# Patient Record
Sex: MALE | Race: WHITE | Employment: FULL TIME | URBAN - METROPOLITAN AREA
[De-identification: names, ages, dates, MRNs, and addresses within clinical notes are randomized per-mention and may not be internally consistent; named-entity substitution may affect disease eponyms.]

---

## 2020-10-31 ENCOUNTER — APPOINTMENT (OUTPATIENT)
Dept: CT IMAGING | Age: 55
End: 2020-10-31
Attending: EMERGENCY MEDICINE
Payer: COMMERCIAL

## 2020-10-31 ENCOUNTER — HOSPITAL ENCOUNTER (EMERGENCY)
Age: 55
Discharge: HOME | End: 2020-10-31
Attending: EMERGENCY MEDICINE
Payer: COMMERCIAL

## 2020-10-31 VITALS
HEIGHT: 73 IN | BODY MASS INDEX: 24.52 KG/M2 | TEMPERATURE: 97.9 F | DIASTOLIC BLOOD PRESSURE: 88 MMHG | RESPIRATION RATE: 16 BRPM | HEART RATE: 71 BPM | WEIGHT: 185 LBS | OXYGEN SATURATION: 99 % | SYSTOLIC BLOOD PRESSURE: 136 MMHG

## 2020-10-31 DIAGNOSIS — N20.1 URETEROLITHIASIS: Primary | ICD-10-CM

## 2020-10-31 LAB
ANION GAP SERPL CALC-SCNC: 5 MMOL/L (ref 7–16)
BASOPHILS # BLD: 0 K/UL (ref 0–0.2)
BASOPHILS NFR BLD: 1 % (ref 0–2)
BUN SERPL-MCNC: 14 MG/DL (ref 6–23)
CALCIUM SERPL-MCNC: 8.8 MG/DL (ref 8.3–10.4)
CHLORIDE SERPL-SCNC: 111 MMOL/L (ref 98–107)
CO2 SERPL-SCNC: 25 MMOL/L (ref 21–32)
CREAT SERPL-MCNC: 1.37 MG/DL (ref 0.8–1.5)
DIFFERENTIAL METHOD BLD: NORMAL
EOSINOPHIL # BLD: 0.2 K/UL (ref 0–0.8)
EOSINOPHIL NFR BLD: 3 % (ref 0.5–7.8)
ERYTHROCYTE [DISTWIDTH] IN BLOOD BY AUTOMATED COUNT: 12.8 % (ref 11.9–14.6)
GLUCOSE SERPL-MCNC: 97 MG/DL (ref 65–100)
HCT VFR BLD AUTO: 42.7 % (ref 41.1–50.3)
HGB BLD-MCNC: 14.8 G/DL (ref 13.6–17.2)
IMM GRANULOCYTES # BLD AUTO: 0 K/UL (ref 0–0.5)
IMM GRANULOCYTES NFR BLD AUTO: 0 % (ref 0–5)
LYMPHOCYTES # BLD: 1 K/UL (ref 0.5–4.6)
LYMPHOCYTES NFR BLD: 15 % (ref 13–44)
MCH RBC QN AUTO: 31.2 PG (ref 26.1–32.9)
MCHC RBC AUTO-ENTMCNC: 34.7 G/DL (ref 31.4–35)
MCV RBC AUTO: 89.9 FL (ref 79.6–97.8)
MONOCYTES # BLD: 0.4 K/UL (ref 0.1–1.3)
MONOCYTES NFR BLD: 6 % (ref 4–12)
NEUTS SEG # BLD: 5.2 K/UL (ref 1.7–8.2)
NEUTS SEG NFR BLD: 76 % (ref 43–78)
NRBC # BLD: 0 K/UL (ref 0–0.2)
PLATELET # BLD AUTO: 206 K/UL (ref 150–450)
PMV BLD AUTO: 10.7 FL (ref 9.4–12.3)
POTASSIUM SERPL-SCNC: 4.2 MMOL/L (ref 3.5–5.1)
RBC # BLD AUTO: 4.75 M/UL (ref 4.23–5.6)
SODIUM SERPL-SCNC: 141 MMOL/L (ref 136–145)
WBC # BLD AUTO: 6.9 K/UL (ref 4.3–11.1)

## 2020-10-31 PROCEDURE — 80048 BASIC METABOLIC PNL TOTAL CA: CPT

## 2020-10-31 PROCEDURE — 99284 EMERGENCY DEPT VISIT MOD MDM: CPT

## 2020-10-31 PROCEDURE — 85025 COMPLETE CBC W/AUTO DIFF WBC: CPT

## 2020-10-31 PROCEDURE — 99283 EMERGENCY DEPT VISIT LOW MDM: CPT

## 2020-10-31 PROCEDURE — 74011250636 HC RX REV CODE- 250/636: Performed by: EMERGENCY MEDICINE

## 2020-10-31 PROCEDURE — 81003 URINALYSIS AUTO W/O SCOPE: CPT

## 2020-10-31 PROCEDURE — 96374 THER/PROPH/DIAG INJ IV PUSH: CPT

## 2020-10-31 PROCEDURE — 74176 CT ABD & PELVIS W/O CONTRAST: CPT

## 2020-10-31 RX ORDER — ONDANSETRON 4 MG/1
4 TABLET, ORALLY DISINTEGRATING ORAL
Qty: 10 TAB | Refills: 0 | Status: SHIPPED | OUTPATIENT
Start: 2020-10-31

## 2020-10-31 RX ORDER — HYDROCODONE BITARTRATE AND ACETAMINOPHEN 7.5; 325 MG/1; MG/1
1 TABLET ORAL
Qty: 12 TAB | Refills: 0 | Status: SHIPPED | OUTPATIENT
Start: 2020-10-31 | End: 2020-11-03

## 2020-10-31 RX ORDER — SODIUM CHLORIDE 0.9 % (FLUSH) 0.9 %
5-40 SYRINGE (ML) INJECTION EVERY 8 HOURS
Status: DISCONTINUED | OUTPATIENT
Start: 2020-10-31 | End: 2020-10-31 | Stop reason: HOSPADM

## 2020-10-31 RX ORDER — SODIUM CHLORIDE 0.9 % (FLUSH) 0.9 %
5-40 SYRINGE (ML) INJECTION AS NEEDED
Status: DISCONTINUED | OUTPATIENT
Start: 2020-10-31 | End: 2020-10-31 | Stop reason: HOSPADM

## 2020-10-31 RX ORDER — TAMSULOSIN HYDROCHLORIDE 0.4 MG/1
0.4 CAPSULE ORAL DAILY
Qty: 15 CAP | Refills: 0 | Status: SHIPPED | OUTPATIENT
Start: 2020-10-31 | End: 2020-11-15

## 2020-10-31 RX ORDER — KETOROLAC TROMETHAMINE 30 MG/ML
15 INJECTION, SOLUTION INTRAMUSCULAR; INTRAVENOUS
Status: COMPLETED | OUTPATIENT
Start: 2020-10-31 | End: 2020-10-31

## 2020-10-31 RX ADMIN — KETOROLAC TROMETHAMINE 15 MG: 30 INJECTION, SOLUTION INTRAMUSCULAR at 11:39

## 2020-10-31 NOTE — DISCHARGE INSTRUCTIONS
Patient Education        Kidney Stone: Care Instructions  Your Care Instructions     Kidney stones are formed when salts, minerals, and other substances normally found in the urine clump together. They can be as small as grains of sand or, rarely, as large as golf balls. While the stone is traveling through the ureter, which is the tube that carries urine from the kidney to the bladder, you will probably feel pain. The pain may be mild or very severe. You may also have some blood in your urine. As soon as the stone reaches the bladder, any intense pain should go away. If a stone is too large to pass on its own, you may need a medical procedure to help you pass the stone. The doctor has checked you carefully, but problems can develop later. If you notice any problems or new symptoms, get medical treatment right away. Follow-up care is a key part of your treatment and safety. Be sure to make and go to all appointments, and call your doctor if you are having problems. It's also a good idea to know your test results and keep a list of the medicines you take. How can you care for yourself at home? · Drink plenty of fluids, enough so that your urine is light yellow or clear like water. If you have kidney, heart, or liver disease and have to limit fluids, talk with your doctor before you increase the amount of fluids you drink. · Take pain medicines exactly as directed. Call your doctor if you think you are having a problem with your medicine. ? If the doctor gave you a prescription medicine for pain, take it as prescribed. ? If you are not taking a prescription pain medicine, ask your doctor if you can take an over-the-counter medicine. Read and follow all instructions on the label. · Your doctor may ask you to strain your urine so that you can collect your kidney stone when it passes. You can use a kitchen strainer or a tea strainer to catch the stone.  Store it in a plastic bag until you see your doctor again.  Preventing future kidney stones  Some changes in your diet may help prevent kidney stones. Depending on the cause of your stones, your doctor may recommend that you:  · Drink plenty of fluids, enough so that your urine is light yellow or clear like water. If you have kidney, heart, or liver disease and have to limit fluids, talk with your doctor before you increase the amount of fluids you drink. · Limit coffee, tea, and alcohol. Also avoid grapefruit juice. · Do not take more than the recommended daily dose of vitamins C and D.  · Avoid antacids such as Gaviscon, Maalox, Mylanta, or Tums. · Limit the amount of salt (sodium) in your diet. · Eat a balanced diet that is not too high in protein. · Limit foods that are high in a substance called oxalate, which can cause kidney stones. These foods include dark green vegetables, rhubarb, chocolate, wheat bran, nuts, cranberries, and beans. When should you call for help? Call your doctor now or seek immediate medical care if:    · You cannot keep down fluids.     · Your pain gets worse.     · You have a fever or chills.     · You have new or worse pain in your back just below your rib cage (the flank area).     · You have new or more blood in your urine. Watch closely for changes in your health, and be sure to contact your doctor if:    · You do not get better as expected. Where can you learn more? Go to http://www.gray.com/  Enter N237 in the search box to learn more about \"Kidney Stone: Care Instructions. \"  Current as of: April 15, 2020               Content Version: 12.6  © 0266-6204 [employer], [employer]. Care instructions adapted under license by [employer] (which disclaims liability or warranty for this information).  If you have questions about a medical condition or this instruction, always ask your healthcare professional. [address] any warranty or liability for your use of this information.

## 2020-10-31 NOTE — ED PROVIDER NOTES
Doron Mccord is a 54 y.o. male seen on 10/31/2020 at 11:25 AM in the [facility] EMERGENCY DEPT in room FT10/10. CC: Flank pain    HPI: 51-year-old  male with history of kidney stones presents emergency department complaining of left flank pain for the last 3 to 4 hours. Patient denies any nausea or vomiting but does describe hematuria and increased difficulty with urination. He denies any fever or chills. Patient states he was seen for some low back pain about a month ago and told he had a kidney stone on x-ray, last time he was seen for kidney stone was about 2 years ago and required lithotripsy. The history is provided by the patient. Back Pain    This is a new problem. The current episode started 3 to 5 hours ago. The problem has not changed since onset. The problem occurs constantly. Patient reports not work related injury. The pain is associated with no known injury. The pain is present in the lower back and left side. The quality of the pain is described as aching, sharp and similar to previous episodes. Radiates to: Left lower quadrant. The pain is at a severity of 7/10. Exacerbated by: Palpation. The pain is the same all the time. Stiffness is present all day. Associated symptoms include abdominal pain. Pertinent negatives include no chest pain, no fever, no numbness, no weight loss, no headaches, no abdominal swelling, no bowel incontinence, no perianal numbness, no bladder incontinence, no dysuria, no pelvic pain, no leg pain, no paresthesias, no paresis, no tingling and no weakness. He has tried bed rest for the symptoms. The treatment provided no relief. Risk factors include history of kidney stones. Blood in Urine    Associated symptoms include hematuria, flank pain, abdominal pain and back pain. Pertinent negatives include no nausea and no vomiting.        REVIEW OF SYSTEMS     Review of Systems   Constitutional: Negative for fever and weight loss. Cardiovascular: Negative for chest pain. Gastrointestinal: Positive for abdominal pain. Negative for bowel incontinence, constipation, diarrhea, nausea and vomiting. Genitourinary: Positive for flank pain and hematuria. Negative for bladder incontinence, dysuria, pelvic pain, penile pain, penile swelling and testicular pain. Musculoskeletal: Positive for back pain. Neurological: Negative for tingling, weakness, numbness, headaches and paresthesias. All other systems reviewed and are negative. PAST MEDICAL HISTORY     Past Medical History:   Diagnosis Date    History of syncope 3/2015    states due to dehydration. Had stress test, echo at [facility] and told OK    Kidney stone     right   Sched. for surgery 10/1/15  (has had prior episodes 1990's)     Past Surgical History:   Procedure Laterality Date    HX OTHER SURGICAL      no prior surgeries     Social History     Socioeconomic History    Marital status:      Spouse name: Not on file    Number of children: Not on file    Years of education: Not on file    Highest education level: Not on file   Tobacco Use    Smoking status: Current Every Day Smoker     Years: 15.00    Smokeless tobacco: Never Used    Tobacco comment: smokes 1 pack per week   Substance and Sexual Activity    Alcohol use: No     Alcohol/week: 0.0 standard drinks    Drug use: No     None     No Known Allergies     PHYSICAL EXAM       Vitals:    10/31/20 1103   BP: (!) 145/87   Pulse: 64   Resp: 16   Temp: 97.3 °F (36.3 °C)   SpO2: 99%    Vital signs were reviewed. Physical Exam  Vitals signs and nursing note reviewed. Constitutional:       General: He is not in acute distress. Appearance: He is well-developed. HENT:      Head: Normocephalic and atraumatic.       Right Ear: External ear normal.      Left Ear: External ear normal.      Mouth/Throat:      Mouth: Mucous membranes are moist.   Eyes:      Extraocular Movements: Extraocular movements intact. Conjunctiva/sclera: Conjunctivae normal.      Pupils: Pupils are equal, round, and reactive to light. Neck:      Musculoskeletal: Normal range of motion and neck supple. Cardiovascular:      Rate and Rhythm: Normal rate and regular rhythm. Pulses: Normal pulses. Heart sounds: Normal heart sounds. No murmur. Pulmonary:      Effort: Pulmonary effort is normal.      Breath sounds: Normal breath sounds. Abdominal:      General: Bowel sounds are normal.      Palpations: Abdomen is soft. Tenderness: There is abdominal tenderness. There is left CVA tenderness. There is no right CVA tenderness, guarding or rebound. Negative signs include Deras's sign and McBurney's sign. Hernia: No hernia is present. Musculoskeletal: Normal range of motion. Skin:     General: Skin is warm and dry. Capillary Refill: Capillary refill takes less than 2 seconds. Neurological:      General: No focal deficit present. Mental Status: He is alert and oriented to person, place, and time. Psychiatric:         Mood and Affect: Mood normal.         Behavior: Behavior normal.          MEDICAL DECISION MAKING     MDM  Number of Diagnoses or Management Options  Ureterolithiasis: new, needed workup     Amount and/or Complexity of Data Reviewed  Clinical lab tests: ordered and reviewed  Tests in the radiology section of CPT®: ordered and reviewed  Review and summarize past medical records: yes  Independent visualization of images, tracings, or specimens: yes    Risk of Complications, Morbidity, and/or Mortality  Presenting problems: moderate  Diagnostic procedures: moderate  Management options: moderate    Patient Progress  Patient progress: improved    Procedures    ED Course: The patient was observed in the ED. Patient feeling much improved at time of discharge.     Results Reviewed:      Recent Results (from the past 24 hour(s))   CBC WITH AUTOMATED DIFF    Collection Time: 10/31/20 11:42 AM   Result Value Ref Range    WBC 6.9 4.3 - 11.1 K/uL    RBC 4.75 4.23 - 5.6 M/uL    HGB 14.8 13.6 - 17.2 g/dL    HCT 42.7 41.1 - 50.3 %    MCV 89.9 79.6 - 97.8 FL    MCH 31.2 26.1 - 32.9 PG    MCHC 34.7 31.4 - 35.0 g/dL    RDW 12.8 11.9 - 14.6 %    PLATELET 171 949 - 328 K/uL    MPV 10.7 9.4 - 12.3 FL    ABSOLUTE NRBC 0.00 0.0 - 0.2 K/uL    DF AUTOMATED      NEUTROPHILS 76 43 - 78 %    LYMPHOCYTES 15 13 - 44 %    MONOCYTES 6 4.0 - 12.0 %    EOSINOPHILS 3 0.5 - 7.8 %    BASOPHILS 1 0.0 - 2.0 %    IMMATURE GRANULOCYTES 0 0.0 - 5.0 %    ABS. NEUTROPHILS 5.2 1.7 - 8.2 K/UL    ABS. LYMPHOCYTES 1.0 0.5 - 4.6 K/UL    ABS. MONOCYTES 0.4 0.1 - 1.3 K/UL    ABS. EOSINOPHILS 0.2 0.0 - 0.8 K/UL    ABS. BASOPHILS 0.0 0.0 - 0.2 K/UL    ABS. IMM. GRANS. 0.0 0.0 - 0.5 K/UL   METABOLIC PANEL, BASIC    Collection Time: 10/31/20 11:42 AM   Result Value Ref Range    Sodium 141 136 - 145 mmol/L    Potassium 4.2 3.5 - 5.1 mmol/L    Chloride 111 (H) 98 - 107 mmol/L    CO2 25 21 - 32 mmol/L    Anion gap 5 (L) 7 - 16 mmol/L    Glucose 97 65 - 100 mg/dL    BUN 14 6 - 23 MG/DL    Creatinine 1.37 0.8 - 1.5 MG/DL    GFR est AA >60 >60 ml/min/1.73m2    GFR est non-AA 57 (L) >60 ml/min/1.73m2    Calcium 8.8 8.3 - 10.4 MG/DL     CT UROGRAM WO CONT   Final Result   IMPRESSION:   1. A 4 mm calculus at the left UVJ causing mild left hydroureteronephrosis. 2. Bilateral nonobstructing renal calculi in the renal pelves. Disposition: Discharge  Diagnosis: Ureterolithiasis  ____________________________________________________________________    A portion of I discussed the results of all labs, procedures, radiographs, and treatments with the patient and available family. Treatment plan is agreed upon and the patient is ready for discharge. All voiced understanding of the discharge plan and medication instructions or changes as appropriate. Questions about treatment in the ED were answered.   All were encouraged to return should symptoms worsen or new problems develop. this note was generated using voice recognition dictation software. While the note has been reviewed for accuracy, please note certain words and phrases may not be transcribed as intended and some grammatical and/or typographical errors may be present.

## 2020-10-31 NOTE — ED NOTES
I have reviewed discharge instructions with the patient. The patient verbalized understanding. Patient left ED via Discharge Method: ambulatory to Home with self. Opportunity for questions and clarification provided. Patient given 3 scripts. To continue your aftercare when you leave the hospital, you may receive an automated call from our care team to check in on how you are doing. This is a free service and part of our promise to provide the best care and service to meet your aftercare needs.  If you have questions, or wish to unsubscribe from this service please call [phone]. Thank you for Choosing our [facility] Emergency Department.

## 2020-11-26 ENCOUNTER — HOSPITAL ENCOUNTER (EMERGENCY)
Age: 55
Discharge: HOME | End: 2020-11-26
Attending: EMERGENCY MEDICINE
Payer: COMMERCIAL

## 2020-11-26 ENCOUNTER — APPOINTMENT (OUTPATIENT)
Dept: GENERAL RADIOLOGY | Age: 55
End: 2020-11-26
Attending: EMERGENCY MEDICINE
Payer: COMMERCIAL

## 2020-11-26 VITALS
WEIGHT: 185 LBS | DIASTOLIC BLOOD PRESSURE: 80 MMHG | OXYGEN SATURATION: 99 % | RESPIRATION RATE: 16 BRPM | HEART RATE: 74 BPM | TEMPERATURE: 98 F | BODY MASS INDEX: 24.41 KG/M2 | SYSTOLIC BLOOD PRESSURE: 136 MMHG

## 2020-11-26 DIAGNOSIS — R10.9 LEFT FLANK PAIN: Primary | ICD-10-CM

## 2020-11-26 DIAGNOSIS — N28.9 RENAL INSUFFICIENCY: ICD-10-CM

## 2020-11-26 LAB
ANION GAP SERPL CALC-SCNC: 6 MMOL/L (ref 7–16)
APPEARANCE UR: ABNORMAL
BACTERIA URNS QL MICRO: 0 /HPF
BASOPHILS # BLD: 0 K/UL (ref 0–0.2)
BASOPHILS NFR BLD: 0 % (ref 0–2)
BILIRUB UR QL: NEGATIVE
BUN SERPL-MCNC: 14 MG/DL (ref 6–23)
CALCIUM SERPL-MCNC: 9 MG/DL (ref 8.3–10.4)
CASTS URNS QL MICRO: 0 /LPF
CHLORIDE SERPL-SCNC: 107 MMOL/L (ref 98–107)
CO2 SERPL-SCNC: 26 MMOL/L (ref 21–32)
COLOR UR: YELLOW
CREAT SERPL-MCNC: 2.08 MG/DL (ref 0.8–1.5)
DIFFERENTIAL METHOD BLD: ABNORMAL
EOSINOPHIL # BLD: 0 K/UL (ref 0–0.8)
EOSINOPHIL NFR BLD: 0 % (ref 0.5–7.8)
EPI CELLS #/AREA URNS HPF: 0 /HPF
ERYTHROCYTE [DISTWIDTH] IN BLOOD BY AUTOMATED COUNT: 12.6 % (ref 11.9–14.6)
GLUCOSE SERPL-MCNC: 120 MG/DL (ref 65–100)
GLUCOSE UR STRIP.AUTO-MCNC: NEGATIVE MG/DL
HCT VFR BLD AUTO: 44 % (ref 41.1–50.3)
HGB BLD-MCNC: 15.4 G/DL (ref 13.6–17.2)
HGB UR QL STRIP: ABNORMAL
IMM GRANULOCYTES # BLD AUTO: 0.1 K/UL (ref 0–0.5)
IMM GRANULOCYTES NFR BLD AUTO: 1 % (ref 0–5)
KETONES UR QL STRIP.AUTO: NEGATIVE MG/DL
LEUKOCYTE ESTERASE UR QL STRIP.AUTO: NEGATIVE
LYMPHOCYTES # BLD: 1.3 K/UL (ref 0.5–4.6)
LYMPHOCYTES NFR BLD: 9 % (ref 13–44)
MCH RBC QN AUTO: 31.2 PG (ref 26.1–32.9)
MCHC RBC AUTO-ENTMCNC: 35 G/DL (ref 31.4–35)
MCV RBC AUTO: 89.2 FL (ref 79.6–97.8)
MONOCYTES # BLD: 1 K/UL (ref 0.1–1.3)
MONOCYTES NFR BLD: 7 % (ref 4–12)
NEUTS SEG # BLD: 11.8 K/UL (ref 1.7–8.2)
NEUTS SEG NFR BLD: 83 % (ref 43–78)
NITRITE UR QL STRIP.AUTO: NEGATIVE
NRBC # BLD: 0 K/UL (ref 0–0.2)
PH UR STRIP: 6 [PH] (ref 5–9)
PLATELET # BLD AUTO: 246 K/UL (ref 150–450)
PMV BLD AUTO: 10.1 FL (ref 9.4–12.3)
POTASSIUM SERPL-SCNC: 3.9 MMOL/L (ref 3.5–5.1)
PROT UR STRIP-MCNC: NEGATIVE MG/DL
RBC # BLD AUTO: 4.93 M/UL (ref 4.23–5.6)
RBC #/AREA URNS HPF: 0 /HPF
SODIUM SERPL-SCNC: 139 MMOL/L (ref 136–145)
SP GR UR REFRACTOMETRY: 1.01 (ref 1–1.02)
UROBILINOGEN UR QL STRIP.AUTO: 0.2 EU/DL (ref 0.2–1)
WBC # BLD AUTO: 14.2 K/UL (ref 4.3–11.1)
WBC URNS QL MICRO: ABNORMAL /HPF

## 2020-11-26 PROCEDURE — 99283 EMERGENCY DEPT VISIT LOW MDM: CPT

## 2020-11-26 PROCEDURE — 96361 HYDRATE IV INFUSION ADD-ON: CPT

## 2020-11-26 PROCEDURE — 36415 COLL VENOUS BLD VENIPUNCTURE: CPT

## 2020-11-26 PROCEDURE — 85025 COMPLETE CBC W/AUTO DIFF WBC: CPT

## 2020-11-26 PROCEDURE — 96374 THER/PROPH/DIAG INJ IV PUSH: CPT

## 2020-11-26 PROCEDURE — 81001 URINALYSIS AUTO W/SCOPE: CPT

## 2020-11-26 PROCEDURE — 74018 RADEX ABDOMEN 1 VIEW: CPT

## 2020-11-26 PROCEDURE — 80048 BASIC METABOLIC PNL TOTAL CA: CPT

## 2020-11-26 PROCEDURE — 74011250636 HC RX REV CODE- 250/636: Performed by: EMERGENCY MEDICINE

## 2020-11-26 RX ORDER — MELOXICAM 15 MG/1
15 TABLET ORAL DAILY
Qty: 30 TAB | Refills: 0 | Status: SHIPPED | OUTPATIENT
Start: 2020-11-26 | End: 2021-10-02

## 2020-11-26 RX ORDER — HYDROCODONE BITARTRATE AND ACETAMINOPHEN 5; 325 MG/1; MG/1
1 TABLET ORAL
Qty: 15 TAB | Refills: 0 | Status: SHIPPED | OUTPATIENT
Start: 2020-11-26 | End: 2020-12-01

## 2020-11-26 RX ORDER — KETOROLAC TROMETHAMINE 30 MG/ML
15 INJECTION, SOLUTION INTRAMUSCULAR; INTRAVENOUS
Status: COMPLETED | OUTPATIENT
Start: 2020-11-26 | End: 2020-11-26

## 2020-11-26 RX ADMIN — KETOROLAC TROMETHAMINE 15 MG: 30 INJECTION, SOLUTION INTRAMUSCULAR at 07:48

## 2020-11-26 RX ADMIN — SODIUM CHLORIDE 1000 ML: 900 INJECTION, SOLUTION INTRAVENOUS at 08:44

## 2020-11-26 RX ADMIN — SODIUM CHLORIDE 1000 ML: 900 INJECTION, SOLUTION INTRAVENOUS at 07:48

## 2020-11-26 NOTE — ED PROVIDER NOTES
Patient is a 59-year-old male presenting emerge department today complaining of left flank pain. Patient states it started yesterday and for the last 24 hours he has been dealing with a dull aching pain not relieved with hydrocodone at home. Patient did have a 4 mm distal left UVJ stone month ago. He said that that stone actually passed the same day that he was in the emergency department he never ended up taking any medications. Patient says that [place] railroad [place] and he drinks a lot of sodas. He has had stones in the past and this feels just like one of his kidney stones. Patient had one episode of vomiting yesterday. He is not having nausea today. He is never had to have surgery for his stones. Past Medical History:   Diagnosis Date    History of syncope 3/2015    states due to dehydration. Had stress test, echo at [facility] and told OK    Kidney stone     right   Sched.  for surgery 10/1/15  (has had prior episodes 1990's)       Past Surgical History:   Procedure Laterality Date    HX OTHER SURGICAL      no prior surgeries         Family History:   Problem Relation Age of Onset    Hypertension Mother     Stroke Mother     Thyroid Disease Mother     Heart Disease Father        Social History     Socioeconomic History    Marital status:      Spouse name: Not on file    Number of children: Not on file    Years of education: Not on file    Highest education level: Not on file   Occupational History    Not on file   Social Needs    Financial resource strain: Not on file    Food insecurity     Worry: Not on file     Inability: Not on file    Transportation needs     Medical: Not on file     Non-medical: Not on file   Tobacco Use    Smoking status: Current Every Day Smoker     Years: 15.00    Smokeless tobacco: Never Used    Tobacco comment: smokes 1 pack per week   Substance and Sexual Activity    Alcohol use: No     Alcohol/week: 0.0 standard drinks  Drug use: No    Sexual activity: Not on file   Lifestyle    Physical activity     Days per week: Not on file     Minutes per session: Not on file    Stress: Not on file   Relationships    Social connections     Talks on phone: Not on file     Gets together: Not on file     Attends Mandaen service: Not on file     Active member of club or organization: Not on file     Attends meetings of clubs or organizations: Not on file     Relationship status: Not on file    Intimate partner violence     Fear of current or ex partner: Not on file     Emotionally abused: Not on file     Physically abused: Not on file     Forced sexual activity: Not on file   Other Topics Concern    Not on file   Social History Narrative    Not on file         ALLERGIES: Patient has no known allergies. Review of Systems   All other systems reviewed and are negative. Vitals:    11/26/20 0718 11/26/20 0719 11/26/20 0847   BP: (!) 140/77     Pulse: 68     Resp: 16     Temp: 97.9 °F (36.6 °C)     SpO2: 99% 98% 99%   Weight: 83.9 kg (185 lb)              Physical Exam     GENERAL:The patient has Body mass index is 24.41 kg/m². Well-hydrated. No acute distress. VITAL SIGNS: Heart rate, blood pressure, respiratory rate reviewed as recorded in  nurse's notes  EYES: Pupils reactive. Extraocular motion intact. No conjunctival redness or drainage. LUNGS: No accessory muscle use  CARDIOVASCULAR: Regular rate and rhythm  EXTREMITIES: Pt moving all 4 extremities with out limitations. Normal muscle tone. NEUROLOGIC: Cranial nerve exam reveals face is symmetrical, tongue is midline  speech is clear. No focal deficits noted  SKIN: No rash or petechiae. Good skin turgor palpated. PSYCHIATRIC: Alert and oriented. Appropriate behavior and judgment.       MDM  Number of Diagnoses or Management Options  Diagnosis management comments:   UTI, pyelonephritis, renal colic, ureteral stone              Amount and/or Complexity of Data Reviewed  Clinical lab tests: reviewed and ordered  Tests in the medicine section of CPT®: ordered and reviewed  Review and summarize past medical records: yes      ED Course as of Nov 26 0912   Thu Nov 26, 2020   0107 Patient's pain is now a 2 out of 10. He is resting comfortably. I talked to him about the acute renal injury and that he will receive another liter of IV fluids. []   8891 Impression:      1. Right renal stone from prior CT demonstrated with left upper renal pole stone  not visualized, likely obscured by extensive stool and bowel gas. XR ABD (KUB) []   [id number] Patient continues to feel well. He has the medicines he needs at home and will not need any additional prescriptions. I did encourage him to follow-up with urology secondary to his renal insufficiency.     []      ED Course User Index  [] Jung Orozco, DO       Procedures

## 2020-11-26 NOTE — ED NOTES
I have reviewed discharge instructions with the patient. The patient verbalized understanding. Patient left ED via Discharge Method: ambulatory to Home with self. Opportunity for questions and clarification provided. Patient given 2 scripts. To continue your aftercare when you leave the hospital, you may receive an automated call from our care team to check in on how you are doing. This is a free service and part of our promise to provide the best care and service to meet your aftercare needs.  If you have questions, or wish to unsubscribe from this service please call [phone]. Thank you for Choosing our [facility] Emergency Department.

## 2020-11-26 NOTE — DISCHARGE INSTRUCTIONS
You may need to follow-up with urology if your symptoms persist.  Continue to take the Flomax and use the medications for pain control and nausea control as needed. Risk of opioid analgesics include, but are not limited to: Overdose they can stop or slow your breathing and lead to death; fractures from falls; drowsiness leading to injury; tolerance, dependence and addiction. You should not operate any motorized vehicles or work from a height greater than ground level when taking opioid analgesics as they increase your fall risks.

## 2020-12-17 ENCOUNTER — ANESTHESIA EVENT (OUTPATIENT)
Dept: SURGERY | Age: 55
End: 2020-12-17
Payer: COMMERCIAL

## 2020-12-17 NOTE — ANESTHESIA PREPROCEDURE EVALUATION
Relevant Problems   RENAL FAILURE   (+) Kidney stone   (+) Renal cysts, acquired, bilateral       Anesthetic History   No history of anesthetic complications            Review of Systems / Medical History  Patient summary reviewed and pertinent labs reviewed    Pulmonary          Smoker         Neuro/Psych   Within defined limits           Cardiovascular                  Exercise tolerance: >4 METS     GI/Hepatic/Renal         Renal disease: stones       Endo/Other  Within defined limits           Other Findings              Physical Exam    Airway  Mallampati: II  TM Distance: 4 - 6 cm  Neck ROM: normal range of motion   Mouth opening: Normal     Cardiovascular    Rhythm: regular  Rate: normal      Pertinent negatives: No murmur   Dental  No notable dental hx       Pulmonary  Breath sounds clear to auscultation               Abdominal         Other Findings            Anesthetic Plan    ASA: 2  Anesthesia type: general          Induction: Intravenous  Anesthetic plan and risks discussed with: Patient      LMA

## 2020-12-18 ENCOUNTER — APPOINTMENT (OUTPATIENT)
Dept: CT IMAGING | Age: 55
End: 2020-12-18
Attending: UROLOGY
Payer: COMMERCIAL

## 2020-12-18 ENCOUNTER — APPOINTMENT (OUTPATIENT)
Dept: GENERAL RADIOLOGY | Age: 55
End: 2020-12-18
Attending: UROLOGY
Payer: COMMERCIAL

## 2020-12-18 ENCOUNTER — ANESTHESIA (OUTPATIENT)
Dept: SURGERY | Age: 55
End: 2020-12-18
Payer: COMMERCIAL

## 2020-12-18 ENCOUNTER — HOSPITAL ENCOUNTER (OUTPATIENT)
Age: 55
Setting detail: OUTPATIENT SURGERY
Discharge: HOME | End: 2020-12-18
Attending: UROLOGY | Admitting: UROLOGY
Payer: COMMERCIAL

## 2020-12-18 VITALS
RESPIRATION RATE: 18 BRPM | OXYGEN SATURATION: 100 % | DIASTOLIC BLOOD PRESSURE: 79 MMHG | TEMPERATURE: 98.2 F | SYSTOLIC BLOOD PRESSURE: 127 MMHG | HEART RATE: 65 BPM

## 2020-12-18 DIAGNOSIS — N20.0 KIDNEY STONE: Primary | ICD-10-CM

## 2020-12-18 PROCEDURE — 76210000021 HC REC RM PH II 0.5 TO 1 HR

## 2020-12-18 PROCEDURE — C2617 STENT, NON-COR, TEM W/O DEL: HCPCS | Performed by: UROLOGY

## 2020-12-18 PROCEDURE — 74011000250 HC RX REV CODE- 250: Performed by: NURSE ANESTHETIST, CERTIFIED REGISTERED

## 2020-12-18 PROCEDURE — 74011250637 HC RX REV CODE- 250/637: Performed by: ANESTHESIOLOGY

## 2020-12-18 PROCEDURE — 74018 RADEX ABDOMEN 1 VIEW: CPT

## 2020-12-18 PROCEDURE — 76210000006 HC OR PH I REC 0.5 TO 1 HR: Performed by: UROLOGY

## 2020-12-18 PROCEDURE — 2709999900 HC NON-CHARGEABLE SUPPLY: Performed by: UROLOGY

## 2020-12-18 PROCEDURE — 77030019927 HC TBNG IRR CYSTO BAXT -A: Performed by: UROLOGY

## 2020-12-18 PROCEDURE — 74011250636 HC RX REV CODE- 250/636: Performed by: ANESTHESIOLOGY

## 2020-12-18 PROCEDURE — C1769 GUIDE WIRE: HCPCS | Performed by: UROLOGY

## 2020-12-18 PROCEDURE — 74011250636 HC RX REV CODE- 250/636: Performed by: NURSE ANESTHETIST, CERTIFIED REGISTERED

## 2020-12-18 PROCEDURE — 74176 CT ABD & PELVIS W/O CONTRAST: CPT

## 2020-12-18 PROCEDURE — 74011000636 HC RX REV CODE- 636: Performed by: UROLOGY

## 2020-12-18 PROCEDURE — 76010000138 HC OR TIME 0.5 TO 1 HR: Performed by: UROLOGY

## 2020-12-18 PROCEDURE — 76210000021 HC REC RM PH II 0.5 TO 1 HR: Performed by: UROLOGY

## 2020-12-18 PROCEDURE — 76060000032 HC ANESTHESIA 0.5 TO 1 HR

## 2020-12-18 PROCEDURE — 77030010509 HC AIRWY LMA MSK TELE -A: Performed by: ANESTHESIOLOGY

## 2020-12-18 PROCEDURE — 74011250636 HC RX REV CODE- 250/636: Performed by: UROLOGY

## 2020-12-18 DEVICE — URETERAL STENT
Type: IMPLANTABLE DEVICE | Site: URETER | Status: FUNCTIONAL
Brand: PERCUFLEX™ PLUS

## 2020-12-18 RX ORDER — EPHEDRINE SULFATE/0.9% NACL/PF 50 MG/5 ML
SYRINGE (ML) INTRAVENOUS AS NEEDED
Status: DISCONTINUED | OUTPATIENT
Start: 2020-12-18 | End: 2020-12-18 | Stop reason: HOSPADM

## 2020-12-18 RX ORDER — OXYCODONE HYDROCHLORIDE 5 MG/1
5 TABLET ORAL
Status: DISCONTINUED | OUTPATIENT
Start: 2020-12-18 | End: 2020-12-18 | Stop reason: HOSPADM

## 2020-12-18 RX ORDER — ACETAMINOPHEN 500 MG
1000 TABLET ORAL ONCE
Status: COMPLETED | OUTPATIENT
Start: 2020-12-18 | End: 2020-12-18

## 2020-12-18 RX ORDER — ALBUTEROL SULFATE 0.83 MG/ML
2.5 SOLUTION RESPIRATORY (INHALATION) AS NEEDED
Status: DISCONTINUED | OUTPATIENT
Start: 2020-12-18 | End: 2020-12-18 | Stop reason: HOSPADM

## 2020-12-18 RX ORDER — PROPOFOL 10 MG/ML
INJECTION, EMULSION INTRAVENOUS AS NEEDED
Status: DISCONTINUED | OUTPATIENT
Start: 2020-12-18 | End: 2020-12-18 | Stop reason: HOSPADM

## 2020-12-18 RX ORDER — LIDOCAINE HYDROCHLORIDE 20 MG/ML
INJECTION, SOLUTION EPIDURAL; INFILTRATION; INTRACAUDAL; PERINEURAL AS NEEDED
Status: DISCONTINUED | OUTPATIENT
Start: 2020-12-18 | End: 2020-12-18 | Stop reason: HOSPADM

## 2020-12-18 RX ORDER — FENTANYL CITRATE 50 UG/ML
INJECTION, SOLUTION INTRAMUSCULAR; INTRAVENOUS AS NEEDED
Status: DISCONTINUED | OUTPATIENT
Start: 2020-12-18 | End: 2020-12-18 | Stop reason: HOSPADM

## 2020-12-18 RX ORDER — DEXAMETHASONE SODIUM PHOSPHATE 4 MG/ML
INJECTION, SOLUTION INTRA-ARTICULAR; INTRALESIONAL; INTRAMUSCULAR; INTRAVENOUS; SOFT TISSUE AS NEEDED
Status: DISCONTINUED | OUTPATIENT
Start: 2020-12-18 | End: 2020-12-18 | Stop reason: HOSPADM

## 2020-12-18 RX ORDER — HYDROMORPHONE HYDROCHLORIDE 2 MG/ML
0.5 INJECTION, SOLUTION INTRAMUSCULAR; INTRAVENOUS; SUBCUTANEOUS
Status: DISCONTINUED | OUTPATIENT
Start: 2020-12-18 | End: 2020-12-18 | Stop reason: HOSPADM

## 2020-12-18 RX ORDER — LIDOCAINE HYDROCHLORIDE 10 MG/ML
0.1 INJECTION INFILTRATION; PERINEURAL AS NEEDED
Status: DISCONTINUED | OUTPATIENT
Start: 2020-12-18 | End: 2020-12-18 | Stop reason: HOSPADM

## 2020-12-18 RX ORDER — CIPROFLOXACIN 500 MG/1
500 TABLET ORAL 2 TIMES DAILY
Qty: 6 TAB | Refills: 0 | Status: SHIPPED | OUTPATIENT
Start: 2020-12-18 | End: 2020-12-21

## 2020-12-18 RX ORDER — SODIUM CHLORIDE, SODIUM LACTATE, POTASSIUM CHLORIDE, CALCIUM CHLORIDE 600; 310; 30; 20 MG/100ML; MG/100ML; MG/100ML; MG/100ML
1000 INJECTION, SOLUTION INTRAVENOUS CONTINUOUS
Status: DISCONTINUED | OUTPATIENT
Start: 2020-12-18 | End: 2020-12-18 | Stop reason: HOSPADM

## 2020-12-18 RX ORDER — CEFAZOLIN SODIUM/WATER 2 G/20 ML
2 SYRINGE (ML) INTRAVENOUS
Status: COMPLETED | OUTPATIENT
Start: 2020-12-18 | End: 2020-12-18

## 2020-12-18 RX ORDER — ONDANSETRON 2 MG/ML
4 INJECTION INTRAMUSCULAR; INTRAVENOUS
Status: DISCONTINUED | OUTPATIENT
Start: 2020-12-18 | End: 2020-12-18 | Stop reason: HOSPADM

## 2020-12-18 RX ORDER — HYDROCODONE BITARTRATE AND ACETAMINOPHEN 5; 325 MG/1; MG/1
1 TABLET ORAL
Qty: 15 TAB | Refills: 0 | Status: SHIPPED | OUTPATIENT
Start: 2020-12-18 | End: 2020-12-21

## 2020-12-18 RX ORDER — MIDAZOLAM HYDROCHLORIDE 1 MG/ML
2 INJECTION, SOLUTION INTRAMUSCULAR; INTRAVENOUS
Status: DISCONTINUED | OUTPATIENT
Start: 2020-12-18 | End: 2020-12-18 | Stop reason: HOSPADM

## 2020-12-18 RX ORDER — ONDANSETRON 2 MG/ML
INJECTION INTRAMUSCULAR; INTRAVENOUS AS NEEDED
Status: DISCONTINUED | OUTPATIENT
Start: 2020-12-18 | End: 2020-12-18 | Stop reason: HOSPADM

## 2020-12-18 RX ADMIN — FENTANYL CITRATE 50 MCG: 50 INJECTION INTRAMUSCULAR; INTRAVENOUS at 14:41

## 2020-12-18 RX ADMIN — LIDOCAINE HYDROCHLORIDE 100 MG: 20 INJECTION, SOLUTION EPIDURAL; INFILTRATION; INTRACAUDAL; PERINEURAL at 14:09

## 2020-12-18 RX ADMIN — FENTANYL CITRATE 25 MCG: 50 INJECTION INTRAMUSCULAR; INTRAVENOUS at 14:17

## 2020-12-18 RX ADMIN — DEXAMETHASONE SODIUM PHOSPHATE 4 MG: 4 INJECTION, SOLUTION INTRAMUSCULAR; INTRAVENOUS at 14:15

## 2020-12-18 RX ADMIN — FENTANYL CITRATE 25 MCG: 50 INJECTION INTRAMUSCULAR; INTRAVENOUS at 14:25

## 2020-12-18 RX ADMIN — Medication 2 G: at 14:12

## 2020-12-18 RX ADMIN — PROPOFOL 150 MG: 10 INJECTION, EMULSION INTRAVENOUS at 14:09

## 2020-12-18 RX ADMIN — Medication 10 MG: at 14:30

## 2020-12-18 RX ADMIN — ONDANSETRON 4 MG: 2 INJECTION INTRAMUSCULAR; INTRAVENOUS at 14:15

## 2020-12-18 RX ADMIN — SODIUM CHLORIDE, SODIUM LACTATE, POTASSIUM CHLORIDE, AND CALCIUM CHLORIDE 1000 ML: 600; 310; 30; 20 INJECTION, SOLUTION INTRAVENOUS at 11:28

## 2020-12-18 RX ADMIN — ACETAMINOPHEN 1000 MG: 500 TABLET, FILM COATED ORAL at 11:28

## 2020-12-18 NOTE — PERIOP NOTES
Pt case changed in pre-op. New case generated in main pre-op for Dr Katy Villeda to take patient care from Dr Clemente Mejia. Pt is to be transferred to main pre-op via stretcher.

## 2020-12-18 NOTE — PERIOP NOTES
TRANSFER - IN REPORT:    Verbal report received from Jeramy RN(name) on [address]  being received from [facility] Pre Op(unit) for routine progression of care      Report consisted of patients Situation, Background, Assessment and   Recommendations(SBAR). Information from the following report(s) Kardex, MAR and Recent Results was reviewed with the receiving nurse. Opportunity for questions and clarification was provided. Assessment completed upon patients arrival to unit and care assumed.

## 2020-12-18 NOTE — ANESTHESIA POSTPROCEDURE EVALUATION
Procedure(s):  PROCEDURE CANCELLED - NOT PERFORMED. general    Anesthesia Post Evaluation      Multimodal analgesia: multimodal analgesia used between 6 hours prior to anesthesia start to PACU discharge  Patient location during evaluation: PACU  Patient participation: complete - patient participated  Level of consciousness: awake and awake and alert  Pain management: adequate  Airway patency: patent  Anesthetic complications: no  Cardiovascular status: acceptable  Respiratory status: acceptable  Hydration status: acceptable  Post anesthesia nausea and vomiting:  controlled      INITIAL Post-op Vital signs:   Vitals Value Taken Time   /86 12/18/20 1547   Temp     Pulse 62 12/18/20 1547   Resp 18 12/18/20 1536   SpO2 100 % 12/18/20 1547   Vitals shown include unvalidated device data.

## 2020-12-18 NOTE — PROGRESS NOTES
Patient scheduled as urgent and was screened for signs/symptoms of COVID or exposure within last 48 hours. No rapid test needed.

## 2020-12-18 NOTE — H&P
History and Physical    Subjective:      Bishnu Lanier is a 54 y.o. male evaluated with a stone in the left proximal ureter. pts passed a ureteral stone then the renal pelvis stone dropped into the proximal ureter. Prior KUB showed this and this image is when the stone was in the proximal ureter. Looks like the left lower pole stone had moved into the proximal ureter. He had pain yesterday and felt that the stone moved and  Is he is in mild discomfort. KUB this morning has a lot of bowel gas and stool and no obvious upper ureteral stone. Stone was about 11mm. In size and on KUB it may be in distal ureter over the sacrum on the left side. Will get a stat CT UROGRAM!!    CT shows left side stone in sacral area now    Past Medical History:   Diagnosis Date    History of syncope 3/2015    states due to dehydration. [facility]    Kidney stone     right   Sched. for surgery 10/1/15  (has had prior episodes 1990's)     Past Surgical History:   Procedure Laterality Date    HX OTHER SURGICAL      no prior surgeries      Family History   Problem Relation Age of Onset    Hypertension Mother     Stroke Mother     Thyroid Disease Mother     Heart Disease Father      Social History     Tobacco Use    Smoking status: Current Every Day Smoker     Years: 15.00    Smokeless tobacco: Never Used    Tobacco comment: smokes 1 pack per week   Substance Use Topics    Alcohol use: No     Alcohol/week: 0.0 standard drinks     No Known Allergies  Prior to Admission medications    Medication Sig Start Date End Date Taking? Authorizing Provider   meloxicam (MOBIC) 15 mg tablet Take 1 Tab by mouth daily. 11/26/20   Sundar ESPINAL,    ondansetron (Zofran ODT) 4 mg disintegrating tablet Take 1 Tab by mouth every eight (8) hours as needed for Nausea. 10/31/20   Ismael Lema MD        Review of Systems:  no change     Objective:      No data found.     No data recorded. Physical Exam:  GENERAL: alert, cooperative, no distress, appears stated age, LUNG: clear to auscultation bilaterally, HEART: regular rate and rhythm, S1, S2 normal, no murmur, click, rub or gallop, ABDOMEN: soft, non-tender. Bowel sounds normal. No masses,  no organomegaly      Assessment:     ureterolithiasis left 11mm stone. [place]s moved to sacral area and we cant do ESWL. Plan ureteroscopy      Plan:     1. The risks, benefits, complications, treatment options, and expected outcomes were discussed with the patient. The patient understands the risks, any and all questions were answered to the patient's satisfaction. 2. Proceed with outpatient left ureteroscopy , laser litho, and stent instead of ESWL since stone moved distally.

## 2020-12-18 NOTE — DISCHARGE INSTRUCTIONS
Ureteral Stent Placement: What to Expect at [address] [place] [address]  A ureteral (say \"you-REE-ter-ul\") stent is a thin, hollow tube that is placed in the ureter to help urine pass from the kidney into the bladder. Ureters are the tubes that connect the kidneys to the bladder. You may have a small amount of blood in your urine for 1 to 3 days after the procedure. While the stent is in place, you may have to urinate more often, feel a sudden need to urinate, or feel like you cannot completely empty your bladder. You may feel some pain when you urinate or do strenuous activity. You also may notice a small amount of blood in your urine after strenuous activities. These side effects usually do not prevent people from doing their normal daily activities. You may have a string attached to your stent. Do not to pull the string unless the doctor tells you to. The doctor will use the string to pull out the stent when you no longer need it. After the procedure, urine may flow better from your kidneys to your bladder. A ureteral stent may be left in place for several days or for as long as several months. Your doctor will take it out when you no longer need it. Cystoscopy: What to Expect at [address] [facility]  A cystoscopy is a procedure that lets a doctor look inside of the bladder and the urethra. The urethra is the tube that carries urine from the bladder to outside the body. The doctor uses a thin, lighted tube called a cystoscope to look for kidney or bladder stones, tumors, bleeding, or infection. After the cystoscopy, your urethra may be sore at first, and it may burn when you urinate for the first few days after the procedure. You may feel the need to urinate more often, and your urine may be pink. These symptoms should get better in 1 or 2 days. You will probably be able to go back to work or most of your usual activities in 1 or 2 days. How can you care for yourself at home?   Activity  · Rest when you feel tired. Getting enough sleep will help you recover. · Try to walk each day. Start by walking a little more than you did the day before. Bit by bit, increase the amount you walk. Walking boosts blood flow and helps prevent pneumonia and constipation. · Avoid strenuous activities, such as bicycle riding, jogging, weight lifting, or aerobic exercise, until your doctor says it is okay. · Ask your doctor when you can drive again. · Most people are able to return to work within 1 or 2 days after the procedure. · You may shower and take baths as usual.  · Ask your doctor when it is okay for you to have sex. Diet  · You can eat your normal diet. If your stomach is upset, try bland, low-fat foods like plain rice, broiled chicken, toast, and yogurt. · Drink plenty of fluids (unless your doctor tells you not to). Medicines  · Take pain medicines exactly as directed. ¨ If the doctor gave you a prescription medicine for pain, take it as prescribed. ¨ If you are not taking a prescription pain medicine, ask your doctor if you can take an over-the-counter medicine. · If you think your pain medicine is making you sick to your stomach:  ¨ Take your medicine after meals (unless your doctor has told you not to). ¨ Ask your doctor for a different pain medicine. · If your doctor prescribed antibiotics, take them as directed. Do not stop taking them just because you feel better. You need to take the full course of antibiotics. Follow-up care is a key part of your treatment and safety. Be sure to make and go to all appointments, and call your doctor if you are having problems. It's also a good idea to know your test results and keep a list of the medicines you take. When should you call for help? Call 911 anytime you think you may need emergency care. For example, call if:  · You passed out (lost consciousness). · You have severe trouble breathing.   · You have sudden chest pain and shortness of breath, or you cough up blood.  · You have severe belly pain. Call your doctor now or seek immediate medical care if:  · You are sick to your stomach or cannot keep fluids down. · Your urine is still red or you see blood clots after you have urinated several times. · You have trouble passing urine or stool, especially if you have pain or swelling in your lower belly. · You have signs of a blood clot, such as:  ¨ Pain in your calf, back of the knee, thigh, or groin. ¨ Redness and swelling in your leg or groin. · You develop a fever or severe chills. · You have pain in your back just below your rib cage. This is called flank pain. Watch closely for changes in your health, and be sure to contact your doctor if:  · A burning feeling is normal for a day or two after the test, but call if it does not get better. · You have a frequent urge to urinate but can pass only small amounts of urine. · It is normal for the urine to have a pinkish color for a few days after the test, but call if it does not get better or if your urine is cloudy, smells bad, or has pus in it. After general anesthesia or intravenous sedation, for 24 hours or while taking prescription Narcotics:  · Limit your activities  · A responsible adult needs to be with you for the next 24 hours  · Do not drive and operate hazardous machinery  · Do not make important personal or business decisions  · Do not drink alcoholic beverages  · If you have not urinated within 8 hours after discharge, and you are experiencing discomfort from urinary retention, please go to the nearest ED. · If you have sleep apnea and have a CPAP machine, please use it for all naps and sleeping. · Please use caution when taking narcotics and any of your home medications that may cause drowsiness. *  Please give a list of your current medications to your Primary Care Provider.   *  Please update this list whenever your medications are discontinued, doses are      changed, or new medications (including over-the-counter products) are added. *  Please carry medication information at all times in case of emergency situations. These are general instructions for a healthy lifestyle:  No smoking/ No tobacco products/ Avoid exposure to second hand smoke  Surgeon General's Warning:  Quitting smoking now greatly reduces serious risk to your health. Obesity, smoking, and sedentary lifestyle greatly increases your risk for illness  A healthy diet, regular physical exercise & weight monitoring are important for maintaining a healthy lifestyle    You may be retaining fluid if you have a history of heart failure or if you experience any of the following symptoms:  Weight gain of 3 pounds or more overnight or 5 pounds in a week, increased swelling in our hands or feet or shortness of breath while lying flat in bed. Please call your doctor as soon as you notice any of these symptoms; do not wait until your next office visit.

## 2020-12-18 NOTE — BRIEF OP NOTE
Brief Postoperative Note    Patient: Vaishnavi Vides  YOB: 1965  MRN: [id number]    Date of Procedure: 12/18/2020     Pre-Op Diagnosis: Left distal ureteral stone [N20.1]    Post-Op Diagnosis: Same    Procedure(s):  CYSTOSCOPY LEFT URETEROSCOPY, laser lithotripsy and stent placement/RPG    Surgeon(s):  Shubham Atkins DO    Surgical Assistant: None    Anesthesia: General     Estimated Blood Loss (mL): Nil    Complications: none immediate    Specimens: * No specimens in log *     Implants:   Implant Name Type Inv.  Item Serial No.  Lot No. LRB No. Used Action   STENT URET 6F 26CM -- [address] [id number]  [id number]  STENT URET 6F 26CM -- [facility] [id number]  [facility]_ [id number] Left 1 Implanted       Drains: * No LDAs found *    Findings: see op note    Electronically Signed by Simon Rodriguez DO on 12/18/2020 at 2:51 PM

## 2020-12-19 NOTE — OP NOTES
[facility]  OPERATIVE REPORT    Name:  Ashok Colin  MR#:  [id number]  :  1965  ACCOUNT #:  [de-identified]  DATE OF SERVICE:  2020    PREOPERATIVE DIAGNOSIS:  Left distal ureteral stone. POSTOPERATIVE DIAGNOSIS:  Left distal ureteral stone. PROCEDURES PERFORMED:  Cystoscopy, left ureteroscopy, laser lithotripsy, left retrograde pyelogram and left ureteral stent placement. SURGEON:  Ang Atkins DO    ASSISTANT:  None. ANESTHESIA:  General.    COMPLICATIONS:  None immediate. SPECIMENS REMOVED:  None. IMPLANTS:  Left ureteral stent. ESTIMATED BLOOD LOSS:  None. CLINICAL HISTORY:  This is a 51-year-old gentleman who was recently diagnosed with a left proximal ureteral stone by plain imaging. He presented earlier today for shockwave lithotripsy. However, on repeat imaging, it was determined that his stone had migrated into the distal ureter. All risks, benefits and alternatives to the above-mentioned procedure have been reviewed and he is willing to proceed at this time. PROCEDURE:  Patient consent was obtained. The patient was brought back to the operating room at which time he was placed in the supine position. After the uneventful induction of general anesthesia, he was then placed in a dorsal lithotomy position. His genital area was prepped and draped and a sterile field applied. A 22-Beninese cystoscope was inserted into the urethra and advanced into the bladder under direct visualization. The urethra and bladder were unremarkable. A guidewire was passed up the left collecting system without difficulty. Semi-rigid ureteroscopy was then performed. I immediately encountered an approximately 7-8 mm stone overlying the left iliac vessels. This was fragmented nicely using a 365 micron holmium laser lithotripsy fiber. The stone was moderately resistant to fragmentation.   Following adequate fragmentation of the stone, I then surveyed the left distal and mid ureter. No other stones or abnormalities were seen. A retrograde pyelogram was performed through the scope outlining the left collecting system. Mild left hydroureteronephrosis was seen to the level of the distal ureter. No filling defects or extravasation of contrast was noted. The scope was then removed and a 6 x 26 double-J ureteral stent was then passed under cystoscopic and fluoroscopic guidance. Excellent curl was noted proximally as well as distally. The patient's bladder was drained. The procedure was terminated. The patient tolerated the procedure well. I will plan to see him back in the office late next week for cystoscopy and stent removal.    INTERPRETATION OF LEFT RETROGRADE PYELOGRAM:  A left retrograde pyelogram was performed through the ureteroscope outlining the left collecting system. Mild left hydroureteronephrosis was seen to the level of the iliac vessels. No filling defects or extravasation of contrast was noted.       DO YOVANY [id number]  D:  12/18/2020 14:57  T:  12/19/2020 2:09  JOB #:  [id number]

## 2021-09-13 ENCOUNTER — APPOINTMENT (OUTPATIENT)
Dept: GENERAL RADIOLOGY | Age: 56
End: 2021-09-13
Attending: PHYSICIAN ASSISTANT
Payer: COMMERCIAL

## 2021-09-13 ENCOUNTER — HOSPITAL ENCOUNTER (EMERGENCY)
Age: 56
Discharge: HOME | End: 2021-09-13
Attending: EMERGENCY MEDICINE
Payer: COMMERCIAL

## 2021-09-13 VITALS
WEIGHT: 185 LBS | BODY MASS INDEX: 24.41 KG/M2 | SYSTOLIC BLOOD PRESSURE: 104 MMHG | TEMPERATURE: 99.4 F | OXYGEN SATURATION: 92 % | HEART RATE: 77 BPM | RESPIRATION RATE: 18 BRPM | DIASTOLIC BLOOD PRESSURE: 62 MMHG

## 2021-09-13 DIAGNOSIS — U07.1 COVID-19: Primary | ICD-10-CM

## 2021-09-13 LAB
ALBUMIN SERPL-MCNC: 3 G/DL (ref 3.5–5)
ALBUMIN/GLOB SERPL: 0.8 {RATIO} (ref 1.2–3.5)
ALP SERPL-CCNC: 200 U/L (ref 50–136)
ALT SERPL-CCNC: 79 U/L (ref 12–65)
ANION GAP SERPL CALC-SCNC: 8 MMOL/L (ref 7–16)
AST SERPL-CCNC: 101 U/L (ref 15–37)
BASOPHILS # BLD: 0 K/UL (ref 0–0.2)
BASOPHILS NFR BLD: 0 % (ref 0–2)
BILIRUB SERPL-MCNC: 0.6 MG/DL (ref 0.2–1.1)
BUN SERPL-MCNC: 9 MG/DL (ref 6–23)
CALCIUM SERPL-MCNC: 8.8 MG/DL (ref 8.3–10.4)
CHLORIDE SERPL-SCNC: 104 MMOL/L (ref 98–107)
CO2 SERPL-SCNC: 27 MMOL/L (ref 21–32)
CREAT SERPL-MCNC: 1.22 MG/DL (ref 0.8–1.5)
DIFFERENTIAL METHOD BLD: ABNORMAL
EOSINOPHIL # BLD: 0.2 K/UL (ref 0–0.8)
EOSINOPHIL NFR BLD: 3 % (ref 0.5–7.8)
ERYTHROCYTE [DISTWIDTH] IN BLOOD BY AUTOMATED COUNT: 12.7 % (ref 11.9–14.6)
GLOBULIN SER CALC-MCNC: 4 G/DL (ref 2.3–3.5)
GLUCOSE SERPL-MCNC: 114 MG/DL (ref 65–100)
HCT VFR BLD AUTO: 45 % (ref 41.1–50.3)
HGB BLD-MCNC: 15.3 G/DL (ref 13.6–17.2)
IMM GRANULOCYTES # BLD AUTO: 0 K/UL (ref 0–0.5)
IMM GRANULOCYTES NFR BLD AUTO: 0 % (ref 0–5)
LYMPHOCYTES # BLD: 0.5 K/UL (ref 0.5–4.6)
LYMPHOCYTES NFR BLD: 10 % (ref 13–44)
MCH RBC QN AUTO: 30.5 PG (ref 26.1–32.9)
MCHC RBC AUTO-ENTMCNC: 34 G/DL (ref 31.4–35)
MCV RBC AUTO: 89.6 FL (ref 79.6–97.8)
MONOCYTES # BLD: 0.3 K/UL (ref 0.1–1.3)
MONOCYTES NFR BLD: 6 % (ref 4–12)
NEUTS SEG # BLD: 4.7 K/UL (ref 1.7–8.2)
NEUTS SEG NFR BLD: 81 % (ref 43–78)
NRBC # BLD: 0 K/UL (ref 0–0.2)
PLATELET # BLD AUTO: 189 K/UL (ref 150–450)
PMV BLD AUTO: 9.8 FL (ref 9.4–12.3)
POTASSIUM SERPL-SCNC: 4 MMOL/L (ref 3.5–5.1)
PROT SERPL-MCNC: 7 G/DL (ref 6.3–8.2)
RBC # BLD AUTO: 5.02 M/UL (ref 4.23–5.6)
SODIUM SERPL-SCNC: 139 MMOL/L (ref 136–145)
WBC # BLD AUTO: 5.7 K/UL (ref 4.3–11.1)

## 2021-09-13 PROCEDURE — 74011250636 HC RX REV CODE- 250/636: Performed by: PHYSICIAN ASSISTANT

## 2021-09-13 PROCEDURE — 80053 COMPREHEN METABOLIC PANEL: CPT

## 2021-09-13 PROCEDURE — 85025 COMPLETE CBC W/AUTO DIFF WBC: CPT

## 2021-09-13 PROCEDURE — 96374 THER/PROPH/DIAG INJ IV PUSH: CPT

## 2021-09-13 PROCEDURE — 99284 EMERGENCY DEPT VISIT MOD MDM: CPT

## 2021-09-13 PROCEDURE — 96375 TX/PRO/DX INJ NEW DRUG ADDON: CPT

## 2021-09-13 PROCEDURE — 71046 X-RAY EXAM CHEST 2 VIEWS: CPT

## 2021-09-13 RX ORDER — ONDANSETRON 2 MG/ML
4 INJECTION INTRAMUSCULAR; INTRAVENOUS
Status: COMPLETED | OUTPATIENT
Start: 2021-09-13 | End: 2021-09-13

## 2021-09-13 RX ORDER — KETOROLAC TROMETHAMINE 30 MG/ML
30 INJECTION, SOLUTION INTRAMUSCULAR; INTRAVENOUS
Status: COMPLETED | OUTPATIENT
Start: 2021-09-13 | End: 2021-09-13

## 2021-09-13 RX ORDER — DEXAMETHASONE 6 MG/1
6 TABLET ORAL
Qty: 7 TABLET | Refills: 0 | Status: SHIPPED | OUTPATIENT
Start: 2021-09-13 | End: 2021-10-02

## 2021-09-13 RX ORDER — DEXAMETHASONE 6 MG/1
6 TABLET ORAL
Qty: 7 TABLET | Refills: 0 | Status: SHIPPED | OUTPATIENT
Start: 2021-09-13 | End: 2021-09-13 | Stop reason: SDUPTHER

## 2021-09-13 RX ADMIN — ONDANSETRON 4 MG: 2 INJECTION INTRAMUSCULAR; INTRAVENOUS at 10:01

## 2021-09-13 RX ADMIN — SODIUM CHLORIDE 1000 ML: 900 INJECTION, SOLUTION INTRAVENOUS at 10:01

## 2021-09-13 RX ADMIN — KETOROLAC TROMETHAMINE 30 MG: 30 INJECTION, SOLUTION INTRAMUSCULAR; INTRAVENOUS at 10:00

## 2021-09-13 NOTE — ED PROVIDER NOTES
Mask was worn during the entire patient examination. Zenaida Mondragon is a 64 y.o. male who presents to the ED with a chief complaint of cough. And shortness of breath. He did test positive for coronavirus on 6 September. He continues to be fatigued and is feeling very weak not eating well. He feels like the cough is gradually getting worse as well. Fatigue has progressed as well. He denies any past lung problems does not wear any home oxygen. Arrived via EMS with normal oxygen saturations. No vomiting      Positive For Covid-19  Associated symptoms: cough    Associated symptoms: no chest pain, no diarrhea, no nausea and no vomiting         Past Medical History:   Diagnosis Date    History of syncope 3/2015    states due to dehydration. Had stress test, echo at [facility] and told OK    Kidney stone     right   Sched.  for surgery 10/1/15  (has had prior episodes 1990's)       Past Surgical History:   Procedure Laterality Date    HX OTHER SURGICAL      no prior surgeries         Family History:   Problem Relation Age of Onset    Hypertension Mother     Stroke Mother     Thyroid Disease Mother     Heart Disease Father        Social History     Socioeconomic History    Marital status:      Spouse name: Not on file    Number of children: Not on file    Years of education: Not on file    Highest education level: Not on file   Occupational History    Not on file   Tobacco Use    Smoking status: Current Every Day Smoker     Years: 15.00    Smokeless tobacco: Never Used    Tobacco comment: smokes 1 pack per week   Substance and Sexual Activity    Alcohol use: No     Alcohol/week: 0.0 standard drinks    Drug use: No    Sexual activity: Not on file   Other Topics Concern    Not on file   Social History Narrative    Not on file     Social Determinants of Health     Financial Resource Strain:     Difficulty of Paying Living Expenses:    Food Insecurity:     Worried About Running Out of Food in the Last Year:    [address] in the Last Year:    Transportation Needs:     Lack of Transportation (Medical):  Lack of Transportation (Non-Medical):    Physical Activity:     Days of Exercise per Week:     Minutes of Exercise per Session:    Stress:     Feeling of Stress :    Social Connections:     Frequency of Communication with Friends and Family:     Frequency of Social Gatherings with Friends and Family:     Attends Holiness Services:     Active Member of Clubs or Organizations:     Attends Club or Organization Meetings:     Marital Status:    Intimate Partner Violence:     Fear of Current or Ex-Partner:     Emotionally Abused:     Physically Abused:     Sexually Abused: ALLERGIES: Patient has no known allergies. Review of Systems   Constitutional: Positive for activity change, appetite change and fatigue. Negative for fever. Respiratory: Positive for cough and shortness of breath. Negative for apnea, choking, chest tightness, wheezing and stridor. Cardiovascular: Negative for chest pain and palpitations. Gastrointestinal: Negative for abdominal distention, abdominal pain, diarrhea, nausea and vomiting. Skin: Negative for color change and wound. Neurological: Positive for weakness. All other systems reviewed and are negative. Vitals:    09/13/21 0941 09/13/21 0943 09/13/21 1143 09/13/21 1213   BP: 116/79  114/72 104/62   Pulse: 77      Resp: 18      Temp: 99.4 °F (37.4 °C)      SpO2: 95%  93% 92%   Weight:  83.9 kg (185 lb)              Physical Exam  Vitals and nursing note reviewed. Constitutional:       General: He is not in acute distress. Appearance: Normal appearance. He is well-developed. He is not ill-appearing, toxic-appearing or diaphoretic. HENT:      Head: Normocephalic and atraumatic. Eyes:      General: No scleral icterus. Conjunctiva/sclera: Conjunctivae normal.   Neck:      Trachea: No tracheal deviation. Cardiovascular:      Rate and Rhythm: Normal rate and regular rhythm. Pulmonary:      Effort: Pulmonary effort is normal. No respiratory distress. Breath sounds: No stridor. No wheezing, rhonchi or rales. Chest:      Chest wall: No tenderness. Abdominal:      General: Abdomen is flat. Tenderness: There is no abdominal tenderness. There is no guarding or rebound. Skin:     Capillary Refill: Capillary refill takes less than 2 seconds. Neurological:      General: No focal deficit present. Mental Status: He is alert and oriented to person, place, and time. Mental status is at baseline. Psychiatric:         Mood and Affect: Mood normal.         Behavior: Behavior normal.          MDM  Number of Diagnoses or Management Options  COVID-19  Diagnosis management comments: Given normal white count and recent Covid positive test I do not feel that this is bacterial will treat with Decadron his oxygen saturation has not dropped while in the ED. He will be given home pulse oximeter instructed to come to the downtown ER for set drops into the 80s and does not easily go back up. Rashaad Banegas MD; 9/13/2021 @3:46 PM Voice dictation software was used during the making of this note. This software is not perfect and grammatical and other typographical errors may be present.   This note has not been proofread for errors.  ===================================================================          Amount and/or Complexity of Data Reviewed  Clinical lab tests: ordered and reviewed (Results for orders placed or performed during the hospital encounter of 09/13/21  -CBC WITH AUTOMATED DIFF       Result                      Value             Ref Range           WBC                         5.7               4.3 - 11.1 K*       RBC                         5.02              4.23 - 5.6 M*       HGB                         15.3              13.6 - 17.2 *       HCT                         45.0              41.1 - 50.3 %       MCV                         89.6              79.6 - 97.8 *       MCH                         30.5              26.1 - 32.9 *       MCHC                        34.0              31.4 - 35.0 *       RDW                         12.7              11.9 - 14.6 %       PLATELET                    189               150 - 450 K/*       MPV                         9.8               9.4 - 12.3 FL       ABSOLUTE NRBC               0.00              0.0 - 0.2 K/*       DF                          AUTOMATED                             NEUTROPHILS                 81 (H)            43 - 78 %           LYMPHOCYTES                 10 (L)            13 - 44 %           MONOCYTES                   6                 4.0 - 12.0 %        EOSINOPHILS                 3                 0.5 - 7.8 %         BASOPHILS                   0                 0.0 - 2.0 %         IMMATURE GRANULOCYTES       0                 0.0 - 5.0 %         ABS. NEUTROPHILS            4.7               1.7 - 8.2 K/*       ABS. LYMPHOCYTES            0.5               0.5 - 4.6 K/*       ABS. MONOCYTES              0.3               0.1 - 1.3 K/*       ABS. EOSINOPHILS            0.2               0.0 - 0.8 K/*       ABS. BASOPHILS              0.0               0.0 - 0.2 K/*       ABS. IMM.  GRANS.            0.0               0.0 - 0.5 K/*  -METABOLIC PANEL, COMPREHENSIVE       Result                      Value             Ref Range           Sodium                      139               136 - 145 mm*       Potassium                   4.0               3.5 - 5.1 mm*       Chloride                    104               98 - 107 mmo*       CO2                         27                21 - 32 mmol*       Anion gap                   8                 7 - 16 mmol/L       Glucose                     114 (H)           65 - 100 mg/*       BUN                         9                 6 - 23 MG/DL        Creatinine                  1.22              0.8 - 1.5 MG*       GFR est AA                  >60               >60 ml/min/1*       GFR est non-AA              >60               >60 ml/min/1*       Calcium                     8.8               8.3 - 10.4 M*       Bilirubin, total            0.6               0.2 - 1.1 MG*       ALT (SGPT)                  79 (H)            12 - 65 U/L         AST (SGOT)                  101 (H)           15 - 37 U/L         Alk. phosphatase            200 (H)           50 - 136 U/L        Protein, total              7.0               6.3 - 8.2 g/*       Albumin                     3.0 (L)           3.5 - 5.0 g/*       Globulin                    4.0 (H)           2.3 - 3.5 g/*       A-G Ratio                   0.8 (L)           1.2 - 3.5     )  Tests in the radiology section of CPT®: ordered and reviewed (XR CHEST PA LAT    Result Date: 9/13/2021  TWO-VIEW CHEST: CLINICAL HISTORY: Short of breath, cough, and fatigue recently positive Covid tests. COMPARISON:  None. FINDINGS: PA and lateral chest images demonstrate no confluent pneumonic infiltrate or significant pleural fluid collection. There is subtle patchy infiltrate at both lung bases, more marked on the left with mild elevation of the hemidiaphragm. The heart size is within normal limits without evidence of congestive heart failure or pneumothorax. The bony thorax appears intact on these views. SUBTLE BIBASILAR ATELECTASIS/INFILTRATE, MORE MARKED ON THE LEFT.     )           Procedures

## 2021-09-13 NOTE — ED NOTES
I have reviewed discharge instructions with the patient. The patient verbalized understanding. Patient left ED via Discharge Method: ambulatory to Home with wife    Opportunity for questions and clarification provided. Patient given 1 scripts. Pulse oximeter provided with instructions        To continue your aftercare when you leave the hospital, you may receive an automated call from our care team to check in on how you are doing. This is a free service and part of our promise to provide the best care and service to meet your aftercare needs.  If you have questions, or wish to unsubscribe from this service please call [phone]. Thank you for Choosing our [facility] Emergency Department.

## 2021-09-13 NOTE — ED TRIAGE NOTES
Brought in by EMS from home/ PT tested +on the 6th for covid/ c/o of sob/ cough/fatigue/ PC sent him to hospital to be evaluated/ VS stable 99% on room air

## 2021-09-13 NOTE — ED NOTES
Pt dx with covid 1 week ago, worsening cough, decreased appetite, using otc meds with minimal relief, no covid vaccine, no immunocompromise   Patient evaluated initially in triage. Rapid Medical Evaluation was conducted and necessary orders have been placed. I have performed a medical screening exam.  Care will now be transferred to the attending physician in the emergency department.   ANDREE Doshi 9:42 AM

## 2021-09-18 ENCOUNTER — HOSPITAL ENCOUNTER (INPATIENT)
Age: 56
LOS: 14 days | Discharge: HOME HEALTH | DRG: 871 | End: 2021-10-02
Admitting: INTERNAL MEDICINE
Payer: COMMERCIAL

## 2021-09-18 ENCOUNTER — APPOINTMENT (OUTPATIENT)
Dept: GENERAL RADIOLOGY | Age: 56
DRG: 871 | End: 2021-09-18
Payer: COMMERCIAL

## 2021-09-18 DIAGNOSIS — U07.1 PNEUMONIA DUE TO COVID-19 VIRUS: Primary | ICD-10-CM

## 2021-09-18 DIAGNOSIS — J12.82 PNEUMONIA DUE TO COVID-19 VIRUS: Primary | ICD-10-CM

## 2021-09-18 DIAGNOSIS — R09.02 HYPOXIA: ICD-10-CM

## 2021-09-18 PROBLEM — J96.01 ACUTE HYPOXEMIC RESPIRATORY FAILURE DUE TO COVID-19 (HCC): Status: ACTIVE | Noted: 2021-09-18

## 2021-09-18 LAB
ALBUMIN SERPL-MCNC: 2.6 G/DL (ref 3.5–5)
ALBUMIN/GLOB SERPL: 0.7 {RATIO} (ref 1.2–3.5)
ALP SERPL-CCNC: 240 U/L (ref 50–136)
ALT SERPL-CCNC: 93 U/L (ref 12–65)
ANION GAP SERPL CALC-SCNC: 9 MMOL/L (ref 7–16)
AST SERPL-CCNC: 56 U/L (ref 15–37)
ATRIAL RATE: 75 BPM
BASOPHILS # BLD: 0 K/UL (ref 0–0.2)
BASOPHILS NFR BLD: 0 % (ref 0–2)
BILIRUB SERPL-MCNC: 1.1 MG/DL (ref 0.2–1.1)
BUN SERPL-MCNC: 16 MG/DL (ref 6–23)
CALCIUM SERPL-MCNC: 8.1 MG/DL (ref 8.3–10.4)
CALCULATED P AXIS, ECG09: 73 DEGREES
CALCULATED R AXIS, ECG10: 63 DEGREES
CALCULATED T AXIS, ECG11: 45 DEGREES
CHLORIDE SERPL-SCNC: 106 MMOL/L (ref 98–107)
CO2 SERPL-SCNC: 24 MMOL/L (ref 21–32)
CREAT SERPL-MCNC: 1.03 MG/DL (ref 0.8–1.5)
CRP SERPL-MCNC: 6.6 MG/DL (ref 0–0.9)
DIAGNOSIS, 93000: NORMAL
DIFFERENTIAL METHOD BLD: ABNORMAL
EOSINOPHIL # BLD: 0 K/UL (ref 0–0.8)
EOSINOPHIL NFR BLD: 0 % (ref 0.5–7.8)
ERYTHROCYTE [DISTWIDTH] IN BLOOD BY AUTOMATED COUNT: 12.5 % (ref 11.9–14.6)
FERRITIN SERPL-MCNC: 787 NG/ML (ref 8–388)
GLOBULIN SER CALC-MCNC: 3.5 G/DL (ref 2.3–3.5)
GLUCOSE SERPL-MCNC: 122 MG/DL (ref 65–100)
HCT VFR BLD AUTO: 42.8 % (ref 41.1–50.3)
HGB BLD-MCNC: 14.9 G/DL (ref 13.6–17.2)
IMM GRANULOCYTES # BLD AUTO: 0.3 K/UL (ref 0–0.5)
IMM GRANULOCYTES NFR BLD AUTO: 2 % (ref 0–5)
LACTATE SERPL-SCNC: 1.5 MMOL/L (ref 0.4–2)
LDH SERPL L TO P-CCNC: 668 U/L (ref 100–190)
LYMPHOCYTES # BLD: 0.5 K/UL (ref 0.5–4.6)
LYMPHOCYTES NFR BLD: 5 % (ref 13–44)
MAGNESIUM SERPL-MCNC: 2.1 MG/DL (ref 1.8–2.4)
MCH RBC QN AUTO: 30.3 PG (ref 26.1–32.9)
MCHC RBC AUTO-ENTMCNC: 34.8 G/DL (ref 31.4–35)
MCV RBC AUTO: 87.2 FL (ref 79.6–97.8)
MONOCYTES # BLD: 0.6 K/UL (ref 0.1–1.3)
MONOCYTES NFR BLD: 5 % (ref 4–12)
NEUTS SEG # BLD: 9.8 K/UL (ref 1.7–8.2)
NEUTS SEG NFR BLD: 88 % (ref 43–78)
NRBC # BLD: 0 K/UL (ref 0–0.2)
P-R INTERVAL, ECG05: 114 MS
PLATELET # BLD AUTO: 331 K/UL (ref 150–450)
PMV BLD AUTO: 9.5 FL (ref 9.4–12.3)
POTASSIUM SERPL-SCNC: 4.1 MMOL/L (ref 3.5–5.1)
PROCALCITONIN SERPL-MCNC: 0.14 NG/ML
PROT SERPL-MCNC: 6.1 G/DL (ref 6.3–8.2)
Q-T INTERVAL, ECG07: 362 MS
QRS DURATION, ECG06: 90 MS
QTC CALCULATION (BEZET), ECG08: 404 MS
RBC # BLD AUTO: 4.91 M/UL (ref 4.23–5.6)
SARS-COV-2, COV2: NORMAL
SODIUM SERPL-SCNC: 139 MMOL/L (ref 136–145)
VENTRICULAR RATE, ECG03: 75 BPM
WBC # BLD AUTO: 11.2 K/UL (ref 4.3–11.1)

## 2021-09-18 PROCEDURE — 74011250636 HC RX REV CODE- 250/636: Performed by: INTERNAL MEDICINE

## 2021-09-18 PROCEDURE — 87040 BLOOD CULTURE FOR BACTERIA: CPT

## 2021-09-18 PROCEDURE — 83735 ASSAY OF MAGNESIUM: CPT

## 2021-09-18 PROCEDURE — 74011250637 HC RX REV CODE- 250/637: Performed by: FAMILY MEDICINE

## 2021-09-18 PROCEDURE — 77010033711 HC HIGH FLOW OXYGEN

## 2021-09-18 PROCEDURE — 83615 LACTATE (LD) (LDH) ENZYME: CPT

## 2021-09-18 PROCEDURE — U0004 COV-19 TEST NON-CDC HGH THRU: HCPCS

## 2021-09-18 PROCEDURE — 83605 ASSAY OF LACTIC ACID: CPT

## 2021-09-18 PROCEDURE — 36415 COLL VENOUS BLD VENIPUNCTURE: CPT

## 2021-09-18 PROCEDURE — 82728 ASSAY OF FERRITIN: CPT

## 2021-09-18 PROCEDURE — 85025 COMPLETE CBC W/AUTO DIFF WBC: CPT

## 2021-09-18 PROCEDURE — 94761 N-INVAS EAR/PLS OXIMETRY MLT: CPT

## 2021-09-18 PROCEDURE — 86140 C-REACTIVE PROTEIN: CPT

## 2021-09-18 PROCEDURE — 93005 ELECTROCARDIOGRAM TRACING: CPT

## 2021-09-18 PROCEDURE — 65270000029 HC RM PRIVATE

## 2021-09-18 PROCEDURE — 71045 X-RAY EXAM CHEST 1 VIEW: CPT

## 2021-09-18 PROCEDURE — 80053 COMPREHEN METABOLIC PANEL: CPT

## 2021-09-18 PROCEDURE — 74011250637 HC RX REV CODE- 250/637: Performed by: INTERNAL MEDICINE

## 2021-09-18 PROCEDURE — 99285 EMERGENCY DEPT VISIT HI MDM: CPT

## 2021-09-18 PROCEDURE — 84145 PROCALCITONIN (PCT): CPT

## 2021-09-18 RX ORDER — PROMETHAZINE HYDROCHLORIDE 25 MG/1
12.5 TABLET ORAL
Status: DISCONTINUED | OUTPATIENT
Start: 2021-09-18 | End: 2021-10-02 | Stop reason: HOSPADM

## 2021-09-18 RX ORDER — GUAIFENESIN 600 MG/1
600 TABLET, EXTENDED RELEASE ORAL EVERY 12 HOURS
Status: DISCONTINUED | OUTPATIENT
Start: 2021-09-18 | End: 2021-09-22

## 2021-09-18 RX ORDER — SODIUM CHLORIDE 0.9 % (FLUSH) 0.9 %
5-40 SYRINGE (ML) INJECTION EVERY 8 HOURS
Status: DISCONTINUED | OUTPATIENT
Start: 2021-09-18 | End: 2021-10-02 | Stop reason: HOSPADM

## 2021-09-18 RX ORDER — ACETAMINOPHEN 325 MG/1
650 TABLET ORAL
Status: DISCONTINUED | OUTPATIENT
Start: 2021-09-18 | End: 2021-10-02 | Stop reason: HOSPADM

## 2021-09-18 RX ORDER — DEXAMETHASONE SODIUM PHOSPHATE 100 MG/10ML
6 INJECTION INTRAMUSCULAR; INTRAVENOUS EVERY 24 HOURS
Status: DISCONTINUED | OUTPATIENT
Start: 2021-09-18 | End: 2021-09-25

## 2021-09-18 RX ORDER — POLYETHYLENE GLYCOL 3350 17 G/17G
17 POWDER, FOR SOLUTION ORAL DAILY PRN
Status: DISCONTINUED | OUTPATIENT
Start: 2021-09-18 | End: 2021-10-02 | Stop reason: HOSPADM

## 2021-09-18 RX ORDER — SODIUM CHLORIDE 0.9 % (FLUSH) 0.9 %
5-10 SYRINGE (ML) INJECTION EVERY 8 HOURS
Status: DISCONTINUED | OUTPATIENT
Start: 2021-09-18 | End: 2021-09-18

## 2021-09-18 RX ORDER — ENOXAPARIN SODIUM 100 MG/ML
40 INJECTION SUBCUTANEOUS DAILY
Status: DISCONTINUED | OUTPATIENT
Start: 2021-09-18 | End: 2021-10-02 | Stop reason: HOSPADM

## 2021-09-18 RX ORDER — FAMOTIDINE 20 MG/1
20 TABLET, FILM COATED ORAL 2 TIMES DAILY
Status: DISCONTINUED | OUTPATIENT
Start: 2021-09-18 | End: 2021-09-19

## 2021-09-18 RX ORDER — TEMAZEPAM 15 MG/1
15 CAPSULE ORAL
Status: DISCONTINUED | OUTPATIENT
Start: 2021-09-18 | End: 2021-09-19 | Stop reason: ALTCHOICE

## 2021-09-18 RX ORDER — ONDANSETRON 2 MG/ML
4 INJECTION INTRAMUSCULAR; INTRAVENOUS
Status: DISCONTINUED | OUTPATIENT
Start: 2021-09-18 | End: 2021-10-02 | Stop reason: HOSPADM

## 2021-09-18 RX ORDER — SODIUM CHLORIDE 0.9 % (FLUSH) 0.9 %
5-40 SYRINGE (ML) INJECTION AS NEEDED
Status: DISCONTINUED | OUTPATIENT
Start: 2021-09-18 | End: 2021-10-02 | Stop reason: HOSPADM

## 2021-09-18 RX ORDER — ACETAMINOPHEN 650 MG/1
650 SUPPOSITORY RECTAL
Status: DISCONTINUED | OUTPATIENT
Start: 2021-09-18 | End: 2021-10-02 | Stop reason: HOSPADM

## 2021-09-18 RX ORDER — SODIUM CHLORIDE 0.9 % (FLUSH) 0.9 %
5-10 SYRINGE (ML) INJECTION AS NEEDED
Status: DISCONTINUED | OUTPATIENT
Start: 2021-09-18 | End: 2021-09-18

## 2021-09-18 RX ORDER — GUAIFENESIN/DEXTROMETHORPHAN 100-10MG/5
5 SYRUP ORAL
Status: DISCONTINUED | OUTPATIENT
Start: 2021-09-18 | End: 2021-09-19 | Stop reason: ALTCHOICE

## 2021-09-18 RX ADMIN — FAMOTIDINE 20 MG: 20 TABLET, FILM COATED ORAL at 17:03

## 2021-09-18 RX ADMIN — ENOXAPARIN SODIUM 40 MG: 40 INJECTION SUBCUTANEOUS at 12:20

## 2021-09-18 RX ADMIN — Medication 10 ML: at 14:07

## 2021-09-18 RX ADMIN — TEMAZEPAM 15 MG: 15 CAPSULE ORAL at 23:19

## 2021-09-18 RX ADMIN — Medication 10 ML: at 22:10

## 2021-09-18 RX ADMIN — GUAIFENESIN 600 MG: 600 TABLET ORAL at 22:10

## 2021-09-18 RX ADMIN — DEXAMETHASONE SODIUM PHOSPHATE 6 MG: 10 INJECTION, SOLUTION INTRAMUSCULAR; INTRAVENOUS at 08:56

## 2021-09-18 NOTE — ED NOTES
Transitioned to Hi-Haresh NCon 12L. Pt persisted with mouth-breathing at 90%, returned to non-rebreather on 12L, now satting 95%.

## 2021-09-18 NOTE — ED NOTES
Pt arrives via [facility] with a CC of Covid+ with exacerbating symptoms. Pt was found 84% on RA. EMS placed NC at 6L leading to 92%. EMS transitioned to 15L Nonrebreather mask, resulted in 98%. Pt seen at [facility] on Monday, discharged home with Covid, told to return for concerning symptoms, educated on possible vent. On arrival to room, pt 84% on room air.

## 2021-09-18 NOTE — H&P
[facility]   History and Physical       Name:  Mendel Don  Age: 64 y.o. Sex: male   :  1965    MRN:  [id number]   PCP:  Agusto Montalvo MD      Admit Date:  2021  5:52 AM   Presenting Complaint: Shortness of Breath      Reason for Admission:  Acute hypoxemic respiratory failure due to COVID-19 ([facility].) [U07.1, J96.01]    Assessment & Plan:   Acute respiratory failure with hypoxia likely due to COVID-19 infection  Patient was tested positive for COVID-19 on . To be hypoxic to 84% on room air. Symptom onset around . Patient is unvaccinated. -Pending COVID-19 PCR  -On Decadron 6 mg daily  -mucinex and Tessalon Perles  -Monitor off antibiotics given procalcitonin of 0.14  -If Covid is positive, patient would qualify for Baricitinib.  -O2 supplement. currently requiring high flow nasal cannula 11 L      Diet: ADULT DIET Regular    VTE ppx: lovenox   GI ppx: ppi  CODE STATUS: Full Code        History of Presenting Illness:     Mendel Don is a 64 y.o. male with no significant past medical history who presented to ED with worsening shortness of breath and fatigue. Reports symptoms started around  and was tested positive for COVID19 on . His symptoms are mild at that time and therefore he did not seek any medical evaluation. He was seen on  in the [facility] ED with worsening lethargy, shortness of breath. He was saturating well on room air and therefore was discharged home with Decadron. He reports that he took all but 2 pills of Decadron since then but his symptoms have worsened. He has been checking his oxygen saturation at home and has been consistently staying in mid 80s past couple days. Also reporting poor appetite, loss of taste and smell, nausea, dry cough, fever and chills. In the ED, patient was found to be 84% on room air. Saturation improved with O2 supplement.   Currently on 15 L high flow nasal cannula plus nonrebreather to maintain saturation of 94%. Laboratory work up is remarkable for WBC 11.2, BUN 16, creatinine 1.03,  Calcitonin of 0.14, CRP of 6.6 x-ray with diffuse bilateral lung infiltrates. EKG showed sinus rhythm with nonspecific T and ST wave abnormalities. SARS-CoV-2 PCR pending. Review of Systems: 10 systems reviewed and negative except as noted in HPI. Past Medical History:   Diagnosis Date    History of syncope 3/2015    states due to dehydration. Had stress test, echo at [facility] and told OK    Kidney stone     right   Sched. for surgery 10/1/15  (has had prior episodes 1990's)       Past Surgical History:   Procedure Laterality Date    HX OTHER SURGICAL      no prior surgeries       Family History : reviewed  Family History   Problem Relation Age of Onset    Hypertension Mother     Stroke Mother     Thyroid Disease Mother     Heart Disease Father         Social History     Tobacco Use    Smoking status: Current Every Day Smoker     Years: 15.00    Smokeless tobacco: Never Used    Tobacco comment: smokes 1 pack per week   Substance Use Topics    Alcohol use: No     Alcohol/week: 0.0 standard drinks       No Known Allergies      There is no immunization history on file for this patient.       PTA Medications:  Current Outpatient Medications   Medication Instructions    dexAMETHasone (DECADRON) 6 mg, Oral, DAILY BEFORE BREAKFAST    meloxicam (MOBIC) 15 mg, Oral, DAILY    ondansetron (ZOFRAN ODT) 4 mg, Oral, EVERY 8 HOURS AS NEEDED       Objective:     Patient Vitals for the past 24 hrs:   Temp Pulse Resp BP SpO2   09/18/21 1526 98 °F (36.7 °C) 85 (!) 32 105/69 91 %   09/18/21 1155 98.2 °F (36.8 °C) 81 (!) 32 106/66 93 %   09/18/21 1132    112/70    09/18/21 1127  75 29  94 %   09/18/21 1032    113/77 96 %   09/18/21 1001    118/80 93 %   09/18/21 0917    (!) 105/57 92 %   09/18/21 0600     90 %   09/18/21 0559  87  126/80 93 %   09/18/21 0556 98.4 °F (36.9 °C) 88  126/80 91 % Oxygen Therapy  O2 Sat (%): 91 % (09/18/21 1526)  Pulse via Oximetry: 82 beats per minute (09/18/21 1127)  O2 Device: Hi flow nasal cannula (09/18/21 0600)  Skin Assessment: Clean, dry, & intact (09/18/21 0556)  O2 Flow Rate (L/min): 11 l/min (09/18/21 0556)    Body mass index is 24.41 kg/m². Physical Exam:    General:     alert, awake, very ill-appearing, in acute respiratory distress. HENT:   normocephalic, atraumatic. Oropharynx clear with moist mucous membranes and normal hard/soft palate  Eyes:   anicteric sclerae, moist conjunctiva, EOMI  Neck:    supple, non-tender. Trachea midline. Lungs:   Diffuse rhonchi, slight wheezing. Cardiac:   RRR, Normal S1 and S2. Abdomen:   Soft, non distended, nontender, +BS, no guarding/rebound  Extremities:   No edema , pedal pulses present, no digital cyanosis  Skin:   Warn, dry, normnal turgor and texture  Neuro:  AAOx3. No gross focal neurological deficit  Psychiatric:  No anxiety, calm, cooperative    Data Reviewed: I have reviewed all labs, meds, and studies. Recent Results (from the past 24 hour(s))   EKG, 12 LEAD, INITIAL    Collection Time: 09/18/21  6:07 AM   Result Value Ref Range    Ventricular Rate 75 BPM    Atrial Rate 75 BPM    P-R Interval 114 ms    QRS Duration 90 ms    Q-T Interval 362 ms    QTC Calculation (Bezet) 404 ms    Calculated P Axis 73 degrees    Calculated R Axis 63 degrees    Calculated T Axis 45 degrees    Diagnosis       !! AGE AND GENDER SPECIFIC ECG ANALYSIS !!   Normal sinus rhythm  Nonspecific ST and T wave abnormality  Abnormal ECG  No previous ECGs available  Confirmed by ST BRIANDA MACIAS MD (), YUKO JACKSON ([id number]) on 9/18/2021 9:25:19 AM     CULTURE, BLOOD    Collection Time: 09/18/21  6:38 AM    Specimen: Blood   Result Value Ref Range    Special Requests: LEFT  Antecubital        Culture result: PENDING    METABOLIC PANEL, COMPREHENSIVE    Collection Time: 09/18/21  6:40 AM   Result Value Ref Range    Sodium 139 136 - 145 mmol/L Potassium 4.1 3.5 - 5.1 mmol/L    Chloride 106 98 - 107 mmol/L    CO2 24 21 - 32 mmol/L    Anion gap 9 7 - 16 mmol/L    Glucose 122 (H) 65 - 100 mg/dL    BUN 16 6 - 23 MG/DL    Creatinine 1.03 0.8 - 1.5 MG/DL    GFR est AA >60 >60 ml/min/1.73m2    GFR est non-AA >60 >60 ml/min/1.73m2    Calcium 8.1 (L) 8.3 - 10.4 MG/DL    Bilirubin, total 1.1 0.2 - 1.1 MG/DL    ALT (SGPT) 93 (H) 12 - 65 U/L    AST (SGOT) 56 (H) 15 - 37 U/L    Alk.  phosphatase 240 (H) 50 - 136 U/L    Protein, total 6.1 (L) 6.3 - 8.2 g/dL    Albumin 2.6 (L) 3.5 - 5.0 g/dL    Globulin 3.5 2.3 - 3.5 g/dL    A-G Ratio 0.7 (L) 1.2 - 3.5     MAGNESIUM    Collection Time: 09/18/21  6:40 AM   Result Value Ref Range    Magnesium 2.1 1.8 - 2.4 mg/dL   C REACTIVE PROTEIN, QT    Collection Time: 09/18/21  6:40 AM   Result Value Ref Range    C-Reactive protein 6.6 (H) 0.0 - 0.9 mg/dL   FERRITIN    Collection Time: 09/18/21  6:40 AM   Result Value Ref Range    Ferritin 787 (H) 8 - 388 NG/ML   LD    Collection Time: 09/18/21  6:40 AM   Result Value Ref Range     (H) 100 - 190 U/L   PROCALCITONIN    Collection Time: 09/18/21  6:49 AM   Result Value Ref Range    Procalcitonin 0.14 ng/mL   LACTIC ACID    Collection Time: 09/18/21  6:49 AM   Result Value Ref Range    Lactic acid 1.5 0.4 - 2.0 MMOL/L   CULTURE, BLOOD    Collection Time: 09/18/21  6:49 AM    Specimen: Blood   Result Value Ref Range    Special Requests: RIGHT  Antecubital        Culture result: PENDING    SARS-COV-2    Collection Time: 09/18/21  8:56 AM   Result Value Ref Range    SARS-CoV-2 Please find results under separate order     CBC WITH AUTOMATED DIFF    Collection Time: 09/18/21 12:07 PM   Result Value Ref Range    WBC 11.2 (H) 4.3 - 11.1 K/uL    RBC 4.91 4.23 - 5.6 M/uL    HGB 14.9 13.6 - 17.2 g/dL    HCT 42.8 41.1 - 50.3 %    MCV 87.2 79.6 - 97.8 FL    MCH 30.3 26.1 - 32.9 PG    MCHC 34.8 31.4 - 35.0 g/dL    RDW 12.5 11.9 - 14.6 %    PLATELET 734 495 - 202 K/uL    MPV 9.5 9.4 - 12.3 FL ABSOLUTE NRBC 0.00 0.0 - 0.2 K/uL    DF AUTOMATED      NEUTROPHILS 88 (H) 43 - 78 %    LYMPHOCYTES 5 (L) 13 - 44 %    MONOCYTES 5 4.0 - 12.0 %    EOSINOPHILS 0 (L) 0.5 - 7.8 %    BASOPHILS 0 0.0 - 2.0 %    IMMATURE GRANULOCYTES 2 0.0 - 5.0 %    ABS. NEUTROPHILS 9.8 (H) 1.7 - 8.2 K/UL    ABS. LYMPHOCYTES 0.5 0.5 - 4.6 K/UL    ABS. MONOCYTES 0.6 0.1 - 1.3 K/UL    ABS. EOSINOPHILS 0.0 0.0 - 0.8 K/UL    ABS. BASOPHILS 0.0 0.0 - 0.2 K/UL    ABS. IMM. GRANS. 0.3 0.0 - 0.5 K/UL       All Micro Results     Procedure Component Value Units Date/Time    CULTURE, BLOOD [[id number]] Collected: 09/18/21 0649    Order Status: Completed Specimen: Blood Updated: 09/18/21 0756     Special Requests: --        RIGHT  Antecubital       Culture result: PENDING    CULTURE, BLOOD [[id number]] Collected: 09/18/21 4845    Order Status: Completed Specimen: Blood Updated: 09/18/21 0756     Special Requests: --        LEFT  Antecubital       Culture result: PENDING          Other Studies:  XR CHEST PORT    Result Date: 9/18/2021  EXAM: XR CHEST PORT HISTORY: Syncope. TECHNIQUE: Frontal chest. COMPARISON: 9/13/2021 FINDINGS: The cardiac silhouette, mediastinum, and pulmonary vasculature are within normal limits. Diffuse, bilateral lung infiltrates are observed. There is no pleural effusion, or pneumothorax. No significant osseous abnormalities are observed. Diffuse, bilateral lung infiltrates are appreciated as described above. This pattern is compatible with atypical pneumonia, including viral pneumonia.         Medications:      Problem List:     Hospital Problems as of 9/18/2021 Date Reviewed: 12/23/2020        Codes Class Noted - Resolved POA    Acute hypoxemic respiratory failure due to COVID-19 [facility]) ICD-10-CM: U07.1, J96.01  ICD-9-CM: 518.81, 079.89, 799.02  9/18/2021 - Present Yes                 Part of this note was written by using a voice dictation software and the note has been proof read but may still contain some grammatical/other typographical errors.        Demetra Adam MD  [facility] Hospitalist Service  September 18, 2021    7:28 AM

## 2021-09-18 NOTE — PROGRESS NOTES
TRANSFER - IN REPORT:    Verbal report received from SHARMIN Michelle(name) on [address]  being received from ED(unit) for routine progression of care      Report consisted of patients Situation, Background, Assessment and   Recommendations(SBAR). Information from the following report(s) SBAR, Kardex, ED Summary, Intake/Output, MAR and Recent Results was reviewed with the receiving nurse. Opportunity for questions and clarification was provided. Assessment completed upon patients arrival to unit and care assumed. Dual skin assessment completed with SHARMIN Joshua. Skin is warm, dry, and intact.

## 2021-09-18 NOTE — ED PROVIDER NOTES
63-year-old male complaining of shortness of breath hypoxia. Patient was diagnosed with COVID-19 10 days ago at [facility].  He was placed on Decadron given a pulse oximeter his sats have been running in the high 80s today. Patient was given Decadron but states he is only taken 1 or 2 of them in the last 10 days. Shortness of Breath  This is a new problem. The current episode started more than 1 week ago. The problem has been rapidly worsening. Associated symptoms include a fever and cough. It is unknown what precipitated the problem. He has tried nothing for the symptoms. Associated medical issues do not include COPD, chronic lung disease or CAD. Past Medical History:   Diagnosis Date    History of syncope 3/2015    states due to dehydration. Had stress test, echo at [facility] and told OK    Kidney stone     right   Sched.  for surgery 10/1/15  (has had prior episodes 1990's)       Past Surgical History:   Procedure Laterality Date    HX OTHER SURGICAL      no prior surgeries         Family History:   Problem Relation Age of Onset    Hypertension Mother     Stroke Mother     Thyroid Disease Mother     Heart Disease Father        Social History     Socioeconomic History    Marital status:      Spouse name: Not on file    Number of children: Not on file    Years of education: Not on file    Highest education level: Not on file   Occupational History    Not on file   Tobacco Use    Smoking status: Current Every Day Smoker     Years: 15.00    Smokeless tobacco: Never Used    Tobacco comment: smokes 1 pack per week   Substance and Sexual Activity    Alcohol use: No     Alcohol/week: 0.0 standard drinks    Drug use: No    Sexual activity: Not on file   Other Topics Concern    Not on file   Social History Narrative    Not on file     Social Determinants of Health     Financial Resource Strain:     Difficulty of Paying Living Expenses:    Food Insecurity:     Worried About Running Out of Food in the Last Year:     Evelina of Food in the Last Year:    Transportation Needs:     Lack of Transportation (Medical):  Lack of Transportation (Non-Medical):    Physical Activity:     Days of Exercise per Week:     Minutes of Exercise per Session:    Stress:     Feeling of Stress :    Social Connections:     Frequency of Communication with Friends and Family:     Frequency of Social Gatherings with Friends and Family:     Attends [employer] Services:     Active Member of Clubs or Organizations:     Attends Club or Organization Meetings:     Marital Status:    Intimate Partner Violence:     Fear of Current or Ex-Partner:     Emotionally Abused:     Physically Abused:     Sexually Abused: ALLERGIES: Patient has no known allergies. Review of Systems   Constitutional: Positive for fever. Negative for activity change. HENT: Negative. Eyes: Negative. Respiratory: Positive for cough and shortness of breath. Cardiovascular: Negative. Gastrointestinal: Negative. Genitourinary: Negative. Musculoskeletal: Negative. Skin: Negative. Neurological: Negative. Psychiatric/Behavioral: Negative. All other systems reviewed and are negative. Vitals:    09/18/21 0556 09/18/21 0559 09/18/21 0600   BP: 126/80 126/80    Pulse: 88 87    Temp: 98.4 °F (36.9 °C)     SpO2: 91% 93% 90%   Weight: 83.9 kg (185 lb)     Height: 6' 1\" (1.854 m)              Physical Exam  Vitals and nursing note reviewed. Constitutional:       General: He is in acute distress. Appearance: He is well-developed. He is ill-appearing. HENT:      Head: Normocephalic and atraumatic. Right Ear: External ear normal.      Left Ear: External ear normal.      Nose: Nose normal.   Eyes:      General: No scleral icterus. Right eye: No discharge. Left eye: No discharge.       Conjunctiva/sclera: Conjunctivae normal.      Pupils: Pupils are equal, round, and reactive to light. Cardiovascular:      Rate and Rhythm: Regular rhythm. Pulmonary:      Effort: Accessory muscle usage and respiratory distress present. Breath sounds: No stridor. Examination of the left-middle field reveals wheezing. Wheezing present. No rales. Abdominal:      General: Bowel sounds are normal. There is no distension. Palpations: Abdomen is soft. Tenderness: There is no abdominal tenderness. Musculoskeletal:         General: Normal range of motion. Cervical back: Normal range of motion. Skin:     General: Skin is warm and dry. Findings: No rash. Neurological:      Mental Status: He is alert and oriented to person, place, and time. Motor: No abnormal muscle tone.       Coordination: Coordination normal.   Psychiatric:         Behavior: Behavior normal.          MDM  Number of Diagnoses or Management Options  Diagnosis management comments: Obvious Covid pneumonia on x-ray patient is hypoxic feels ill and appears ill will ask hospitalist to admit       Amount and/or Complexity of Data Reviewed  Clinical lab tests: ordered and reviewed  Tests in the radiology section of CPT®: ordered and reviewed  Tests in the medicine section of CPT®: ordered and reviewed  Decide to obtain previous medical records or to obtain history from someone other than the patient: yes  Review and summarize past medical records: yes  Independent visualization of images, tracings, or specimens: yes    Risk of Complications, Morbidity, and/or Mortality  Presenting problems: high  Diagnostic procedures: high  Management options: high           Procedures

## 2021-09-18 NOTE — PROGRESS NOTES
INITIAL SPIRITUAL ASSESSMENT     NOTED:      PATIENT IS BEING ADMITTED TO FLOOR FROM ER      [facility]    SPOUSE -  SANTIAGO RIOS      WILL ASSESS HOW WE CAN BEST SERVE THIS FAMILY

## 2021-09-19 ENCOUNTER — APPOINTMENT (OUTPATIENT)
Dept: CT IMAGING | Age: 56
DRG: 871 | End: 2021-09-19
Payer: COMMERCIAL

## 2021-09-19 LAB
ANION GAP SERPL CALC-SCNC: 12 MMOL/L (ref 7–16)
BUN SERPL-MCNC: 15 MG/DL (ref 6–23)
CALCIUM SERPL-MCNC: 9.1 MG/DL (ref 8.3–10.4)
CHLORIDE SERPL-SCNC: 109 MMOL/L (ref 98–107)
CO2 SERPL-SCNC: 19 MMOL/L (ref 21–32)
CREAT SERPL-MCNC: 0.96 MG/DL (ref 0.8–1.5)
D DIMER PPP FEU-MCNC: 1.12 UG/ML(FEU)
ERYTHROCYTE [DISTWIDTH] IN BLOOD BY AUTOMATED COUNT: 12.7 % (ref 11.9–14.6)
GLUCOSE SERPL-MCNC: 111 MG/DL (ref 65–100)
HCT VFR BLD AUTO: 40.4 % (ref 41.1–50.3)
HGB BLD-MCNC: 14 G/DL (ref 13.6–17.2)
MCH RBC QN AUTO: 30.1 PG (ref 26.1–32.9)
MCHC RBC AUTO-ENTMCNC: 34.7 G/DL (ref 31.4–35)
MCV RBC AUTO: 86.9 FL (ref 79.6–97.8)
NRBC # BLD: 0 K/UL (ref 0–0.2)
PLATELET # BLD AUTO: 362 K/UL (ref 150–450)
PMV BLD AUTO: 9.6 FL (ref 9.4–12.3)
POTASSIUM SERPL-SCNC: 3.8 MMOL/L (ref 3.5–5.1)
RBC # BLD AUTO: 4.65 M/UL (ref 4.23–5.6)
SODIUM SERPL-SCNC: 140 MMOL/L (ref 138–145)
WBC # BLD AUTO: 13 K/UL (ref 4.3–11.1)

## 2021-09-19 PROCEDURE — 74011000258 HC RX REV CODE- 258: Performed by: HOSPITALIST

## 2021-09-19 PROCEDURE — 74011000636 HC RX REV CODE- 636: Performed by: HOSPITALIST

## 2021-09-19 PROCEDURE — 85379 FIBRIN DEGRADATION QUANT: CPT

## 2021-09-19 PROCEDURE — 36415 COLL VENOUS BLD VENIPUNCTURE: CPT

## 2021-09-19 PROCEDURE — 77010033711 HC HIGH FLOW OXYGEN

## 2021-09-19 PROCEDURE — 74011000250 HC RX REV CODE- 250: Performed by: HOSPITALIST

## 2021-09-19 PROCEDURE — 94760 N-INVAS EAR/PLS OXIMETRY 1: CPT

## 2021-09-19 PROCEDURE — 85027 COMPLETE CBC AUTOMATED: CPT

## 2021-09-19 PROCEDURE — 71260 CT THORAX DX C+: CPT

## 2021-09-19 PROCEDURE — 65270000029 HC RM PRIVATE

## 2021-09-19 PROCEDURE — 80048 BASIC METABOLIC PNL TOTAL CA: CPT

## 2021-09-19 PROCEDURE — 74011250637 HC RX REV CODE- 250/637: Performed by: INTERNAL MEDICINE

## 2021-09-19 PROCEDURE — 74011250636 HC RX REV CODE- 250/636: Performed by: INTERNAL MEDICINE

## 2021-09-19 RX ORDER — ZINC SULFATE 50(220)MG
1 CAPSULE ORAL DAILY
Status: DISCONTINUED | OUTPATIENT
Start: 2021-09-20 | End: 2021-09-27

## 2021-09-19 RX ORDER — SODIUM CHLORIDE 0.9 % (FLUSH) 0.9 %
10 SYRINGE (ML) INJECTION
Status: COMPLETED | OUTPATIENT
Start: 2021-09-19 | End: 2021-09-19

## 2021-09-19 RX ORDER — TEMAZEPAM 15 MG/1
15 CAPSULE ORAL
Status: DISCONTINUED | OUTPATIENT
Start: 2021-09-19 | End: 2021-09-23

## 2021-09-19 RX ORDER — ASCORBIC ACID 500 MG
2000 TABLET ORAL DAILY
Status: DISCONTINUED | OUTPATIENT
Start: 2021-09-20 | End: 2021-09-26

## 2021-09-19 RX ORDER — CODEINE PHOSPHATE AND GUAIFENESIN 10; 100 MG/5ML; MG/5ML
10 SOLUTION ORAL
Status: DISCONTINUED | OUTPATIENT
Start: 2021-09-19 | End: 2021-10-02 | Stop reason: HOSPADM

## 2021-09-19 RX ORDER — ACETYLCYSTEINE 200 MG/ML
600 SOLUTION ORAL; RESPIRATORY (INHALATION) 2 TIMES DAILY
Status: COMPLETED | OUTPATIENT
Start: 2021-09-19 | End: 2021-09-22

## 2021-09-19 RX ORDER — MELATONIN
4000 DAILY
Status: DISCONTINUED | OUTPATIENT
Start: 2021-09-20 | End: 2021-09-26

## 2021-09-19 RX ADMIN — Medication 10 ML: at 13:10

## 2021-09-19 RX ADMIN — Medication 10 ML: at 05:55

## 2021-09-19 RX ADMIN — PROMETHAZINE HYDROCHLORIDE 12.5 MG: 25 TABLET ORAL at 09:19

## 2021-09-19 RX ADMIN — SODIUM CHLORIDE 100 ML: 900 INJECTION, SOLUTION INTRAVENOUS at 13:22

## 2021-09-19 RX ADMIN — GUAIFENESIN 600 MG: 600 TABLET ORAL at 22:36

## 2021-09-19 RX ADMIN — FAMOTIDINE 20 MG: 20 TABLET, FILM COATED ORAL at 08:22

## 2021-09-19 RX ADMIN — GUAIFENESIN 600 MG: 600 TABLET ORAL at 08:22

## 2021-09-19 RX ADMIN — Medication 10 ML: at 13:22

## 2021-09-19 RX ADMIN — DEXAMETHASONE SODIUM PHOSPHATE 6 MG: 10 INJECTION, SOLUTION INTRAMUSCULAR; INTRAVENOUS at 08:22

## 2021-09-19 RX ADMIN — ENOXAPARIN SODIUM 40 MG: 40 INJECTION SUBCUTANEOUS at 08:22

## 2021-09-19 RX ADMIN — ACETAMINOPHEN 650 MG: 325 TABLET ORAL at 09:19

## 2021-09-19 RX ADMIN — ACETYLCYSTEINE 600 MG: 200 SOLUTION ORAL; RESPIRATORY (INHALATION) at 17:05

## 2021-09-19 RX ADMIN — IOPAMIDOL 100 ML: 755 INJECTION, SOLUTION INTRAVENOUS at 13:21

## 2021-09-19 RX ADMIN — Medication 10 ML: at 22:36

## 2021-09-19 NOTE — PROGRESS NOTES
Pt currently resting on 15 L HFNC, O2 sat >90. Pt denies needs at this time. Hourly rounds completed, bed in low locked position, call light within reach, and  all needs met at this time. End of shift report given to on coming nurse.

## 2021-09-19 NOTE — PROGRESS NOTES
Hospitalist Progress Note   Admit Date:  2021  5:52 AM   Name:  Deisi Yates   Age:  64 y.o. Sex:  male  :  1965   MRN:  [id number]   Room:  [facility]/    Presenting Complaint: Shortness of Breath    Reason(s) for Admission: Acute hypoxemic respiratory failure due to COVID-19 ([email or web address] [U07.1, J96.01]     Hospital Course & Interval History:   Deisi Yatse is a 64 y.o. male with no significant past medical history who presented to ED with worsening shortness of breath and fatigue. Reports symptoms started around  and was tested positive for COVID19 on . His symptoms were mild initially and therefore he did not seek any medical evaluation. He was seen on  in the [facility] ED with worsening lethargy, shortness of breath. He was saturating well on room air and therefore was discharged home with Decadron. He reports that he took all but 2 pills of Decadron since then but his symptoms have worsened. He has been checking his oxygen saturation at home and has been consistently staying in mid 80s past couple days. Also reporting poor appetite, loss of taste and smell, nausea, dry cough, fever and chills. In the ED, patient was found to be 84% on room air. Saturation improved with O2 supplement. Currently on 15 L high flow nasal cannula plus nonrebreather to maintain saturation of 94%. Laboratory work up is remarkable for WBC 11.2, BUN 16, creatinine 1.03,  Calcitonin of 0.14, CRP of 6.6 x-ray with diffuse bilateral lung infiltrates. EKG showed sinus rhythm with nonspecific T and ST wave abnormalities. SARS-CoV-2 PCR pending.         Subjective (21):  \"I just feel miserable. \"  56y.o WM in bed, c/o generalized malaise, fatigue, subjective fevers, shortness of breath. Review of Systems:  10 systems reviewed and negative except as noted in HPI.       Assessment & Plan:     Principal Problem:    Acute hypoxemic respiratory failure due to COVID-19 ([facility] (2021)  - 15L [place] NC  - elevated d-dimer; given progression of worsening dyspnea since 9/6/2021, check CT chest r/o PE  - cont decadron  - awaiting PCR; if positive consider baricitinib  - would not start abx as pt remains afebrile and procal levels 0.14      PUD PPX  - pepcid bid      Dispo/Discharge Planning:    - anticipate at least 2 midnight hospitalization; will most likely need home O2    Diet:  ADULT DIET Regular  DVT PPx: lovenox  Code status: Full Code    Hospital Problems as of 9/19/2021 Date Reviewed: 12/23/2020        Codes Class Noted - Resolved POA    * (Principal) Acute hypoxemic respiratory failure due to COVID-19 [facility]) ICD-10-CM: U07.1, J96.01  ICD-9-CM: 518.81, 079.89, 799.02  9/18/2021 - Present Yes              Objective:     Patient Vitals for the past 24 hrs:   Temp Pulse Resp BP SpO2   09/19/21 1123 97.6 °F (36.4 °C) 81 (!) 32 (!) 106/54 90 %   09/19/21 1000   22  94 %   09/19/21 0731 98.6 °F (37 °C) 86 (!) 32 (!) 102/58 91 %   09/19/21 0434 98.1 °F (36.7 °C)  18 120/70 91 %   09/19/21 0032 98 °F (36.7 °C) 66 20 118/75 90 %   09/18/21 2310     92 %   09/18/21 1957 97.9 °F (36.6 °C) 67 20 128/77 91 %   09/18/21 1526 98 °F (36.7 °C) 85 (!) 32 105/69 91 %   09/18/21 1155 98.2 °F (36.8 °C) 81 (!) 32 106/66 93 %     Oxygen Therapy  O2 Sat (%): 90 % (09/19/21 1123)  Pulse via Oximetry: 84 beats per minute (09/18/21 2310)  O2 Device: Hi flow nasal cannula (09/19/21 0305)  Skin Assessment: Clean, dry, & intact (09/18/21 0556)  O2 Flow Rate (L/min): 15 l/min (09/19/21 0305)    Estimated body mass index is 24.41 kg/m² as calculated from the following:    Height as of this encounter: 6' 1\" (1.854 m). Weight as of this encounter: 83.9 kg (185 lb).     Intake/Output Summary (Last 24 hours) at 9/19/2021 1145  Last data filed at 9/19/2021 1000  Gross per 24 hour   Intake 120 ml   Output 450 ml   Net -330 ml         Physical Exam:     General:    Ill-appearing  No overt distress  Head:  Normocephalic, atraumatic  Eyes:  Sclerae appear normal.  Pupils equally round. ENT:  Nares appear normal, no drainage. Nasal cannula in place, no epistaxis  Neck:  No restricted ROM. Trachea midline   CV:   RRR. No m/r/g. No jugular venous distension. Lungs:   Diminished lung fields b/l with crackles to midlung; no wheezing or accessory muscles used  Abdomen: Bowel sounds present. Soft, nontender, nondistended. Extremities: No cyanosis or clubbing. No edema  Skin:     No rashes and normal coloration. Warm and dry. Neuro:  Cranial nerves II-XII grossly intact. Sensation intact  Psych:  Normal mood and affect. Alert and oriented x3    I have reviewed ordered lab tests and independently visualized imaging below:    Last 24hr Labs:  Recent Results (from the past 24 hour(s))   CBC WITH AUTOMATED DIFF    Collection Time: 09/18/21 12:07 PM   Result Value Ref Range    WBC 11.2 (H) 4.3 - 11.1 K/uL    RBC 4.91 4.23 - 5.6 M/uL    HGB 14.9 13.6 - 17.2 g/dL    HCT 42.8 41.1 - 50.3 %    MCV 87.2 79.6 - 97.8 FL    MCH 30.3 26.1 - 32.9 PG    MCHC 34.8 31.4 - 35.0 g/dL    RDW 12.5 11.9 - 14.6 %    PLATELET 348 241 - 839 K/uL    MPV 9.5 9.4 - 12.3 FL    ABSOLUTE NRBC 0.00 0.0 - 0.2 K/uL    DF AUTOMATED      NEUTROPHILS 88 (H) 43 - 78 %    LYMPHOCYTES 5 (L) 13 - 44 %    MONOCYTES 5 4.0 - 12.0 %    EOSINOPHILS 0 (L) 0.5 - 7.8 %    BASOPHILS 0 0.0 - 2.0 %    IMMATURE GRANULOCYTES 2 0.0 - 5.0 %    ABS. NEUTROPHILS 9.8 (H) 1.7 - 8.2 K/UL    ABS. LYMPHOCYTES 0.5 0.5 - 4.6 K/UL    ABS. MONOCYTES 0.6 0.1 - 1.3 K/UL    ABS. EOSINOPHILS 0.0 0.0 - 0.8 K/UL    ABS. BASOPHILS 0.0 0.0 - 0.2 K/UL    ABS. IMM.  GRANS. 0.3 0.0 - 0.5 K/UL   METABOLIC PANEL, BASIC    Collection Time: 09/19/21  5:54 AM   Result Value Ref Range    Sodium 140 138 - 145 mmol/L    Potassium 3.8 3.5 - 5.1 mmol/L    Chloride 109 (H) 98 - 107 mmol/L    CO2 19 (L) 21 - 32 mmol/L    Anion gap 12 7 - 16 mmol/L    Glucose 111 (H) 65 - 100 mg/dL    BUN 15 6 - 23 MG/DL Creatinine 0.96 0.8 - 1.5 MG/DL    GFR est AA >60 >60 ml/min/1.73m2    GFR est non-AA >60 >60 ml/min/1.73m2    Calcium 9.1 8.3 - 10.4 MG/DL   D DIMER    Collection Time: 09/19/21  5:54 AM   Result Value Ref Range    D DIMER 1.12 (H) <0.56 ug/ml(FEU)   CBC W/O DIFF    Collection Time: 09/19/21 10:46 AM   Result Value Ref Range    WBC 13.0 (H) 4.3 - 11.1 K/uL    RBC 4.65 4.23 - 5.6 M/uL    HGB 14.0 13.6 - 17.2 g/dL    HCT 40.4 (L) 41.1 - 50.3 %    MCV 86.9 79.6 - 97.8 FL    MCH 30.1 26.1 - 32.9 PG    MCHC 34.7 31.4 - 35.0 g/dL    RDW 12.7 11.9 - 14.6 %    PLATELET 948 051 - 912 K/uL    MPV 9.6 9.4 - 12.3 FL    ABSOLUTE NRBC 0.00 0.0 - 0.2 K/uL       All Micro Results     Procedure Component Value Units Date/Time    CULTURE, BLOOD [[id number]] Collected: 09/18/21 0638    Order Status: Completed Specimen: Blood Updated: 09/19/21 0638     Special Requests: --        LEFT  Antecubital       Culture result: NO GROWTH AFTER 22 HOURS       CULTURE, BLOOD [[id number]] Collected: 09/18/21 0649    Order Status: Completed Specimen: Blood Updated: 09/19/21 7793     Special Requests: --        RIGHT  Antecubital       Culture result: NO GROWTH AFTER 22 HOURS             Other Studies:  No results found.     Current Meds:  Current Facility-Administered Medications   Medication Dose Route Frequency    sodium chloride (NS) flush 5-40 mL  5-40 mL IntraVENous Q8H    sodium chloride (NS) flush 5-40 mL  5-40 mL IntraVENous PRN    acetaminophen (TYLENOL) tablet 650 mg  650 mg Oral Q6H PRN    Or    acetaminophen (TYLENOL) suppository 650 mg  650 mg Rectal Q6H PRN    polyethylene glycol (MIRALAX) packet 17 g  17 g Oral DAILY PRN    promethazine (PHENERGAN) tablet 12.5 mg  12.5 mg Oral Q6H PRN    Or    ondansetron (ZOFRAN) injection 4 mg  4 mg IntraVENous Q6H PRN    enoxaparin (LOVENOX) injection 40 mg  40 mg SubCUTAneous DAILY    guaiFENesin-dextromethorphan (ROBITUSSIN DM) 100-10 mg/5 mL syrup 5 mL  5 mL Oral Q4H PRN    dexamethasone (DECADRON) 10 mg/mL injection 6 mg  6 mg IntraVENous Q24H    guaiFENesin ER (MUCINEX) tablet 600 mg  600 mg Oral Q12H    famotidine (PEPCID) tablet 20 mg  20 mg Oral BID    temazepam (RESTORIL) capsule 15 mg  15 mg Oral QHS PRN       Signed:  ANDREE Cam    Part of this note may have been written by using a voice dictation software. The note has been proof read but may still contain some grammatical/other typographical errors.

## 2021-09-19 NOTE — PROGRESS NOTES
Upon assessment this morning, patient very anxious, tachypnic, expressing that he does not want to be in the hospital any longer. 80s% on spo2. CNA and RN talked with patient, reminded him to take slower deeper breaths. After calming down, patient was 94% on 15L HFNC. Patient able to have rational conversation once less anxious.  CT of the chest today showed no PE.

## 2021-09-20 LAB
ANION GAP SERPL CALC-SCNC: 10 MMOL/L (ref 7–16)
BUN SERPL-MCNC: 19 MG/DL (ref 6–23)
CALCIUM SERPL-MCNC: 8.7 MG/DL (ref 8.3–10.4)
CHLORIDE SERPL-SCNC: 108 MMOL/L (ref 98–107)
CO2 SERPL-SCNC: 22 MMOL/L (ref 21–32)
CREAT SERPL-MCNC: 1.03 MG/DL (ref 0.8–1.5)
CRP SERPL HS-MCNC: 82.7 MG/L
D DIMER PPP FEU-MCNC: 0.76 UG/ML(FEU)
ERYTHROCYTE [DISTWIDTH] IN BLOOD BY AUTOMATED COUNT: 12.8 % (ref 11.9–14.6)
FERRITIN SERPL-MCNC: 774 NG/ML (ref 8–388)
GLUCOSE SERPL-MCNC: 90 MG/DL (ref 65–100)
HCT VFR BLD AUTO: 44.7 % (ref 41.1–50.3)
HGB BLD-MCNC: 15.5 G/DL (ref 13.6–17.2)
MCH RBC QN AUTO: 30.8 PG (ref 26.1–32.9)
MCHC RBC AUTO-ENTMCNC: 34.7 G/DL (ref 31.4–35)
MCV RBC AUTO: 88.7 FL (ref 79.6–97.8)
NRBC # BLD: 0 K/UL (ref 0–0.2)
PLATELET # BLD AUTO: 439 K/UL (ref 150–450)
PMV BLD AUTO: 9.8 FL (ref 9.4–12.3)
POTASSIUM SERPL-SCNC: 4 MMOL/L (ref 3.5–5.1)
RBC # BLD AUTO: 5.04 M/UL (ref 4.23–5.6)
SARS COV-2, XPGCVT: POSITIVE
SODIUM SERPL-SCNC: 140 MMOL/L (ref 138–145)
WBC # BLD AUTO: 14.7 K/UL (ref 4.3–11.1)

## 2021-09-20 PROCEDURE — 77010033711 HC HIGH FLOW OXYGEN

## 2021-09-20 PROCEDURE — 94760 N-INVAS EAR/PLS OXIMETRY 1: CPT

## 2021-09-20 PROCEDURE — 74011250637 HC RX REV CODE- 250/637: Performed by: HOSPITALIST

## 2021-09-20 PROCEDURE — 86141 C-REACTIVE PROTEIN HS: CPT

## 2021-09-20 PROCEDURE — 74011250636 HC RX REV CODE- 250/636: Performed by: INTERNAL MEDICINE

## 2021-09-20 PROCEDURE — 74011250637 HC RX REV CODE- 250/637: Performed by: INTERNAL MEDICINE

## 2021-09-20 PROCEDURE — 36415 COLL VENOUS BLD VENIPUNCTURE: CPT

## 2021-09-20 PROCEDURE — 65270000029 HC RM PRIVATE

## 2021-09-20 PROCEDURE — 82728 ASSAY OF FERRITIN: CPT

## 2021-09-20 PROCEDURE — 74011250637 HC RX REV CODE- 250/637: Performed by: PHYSICIAN ASSISTANT

## 2021-09-20 PROCEDURE — 74011000250 HC RX REV CODE- 250: Performed by: HOSPITALIST

## 2021-09-20 PROCEDURE — 85027 COMPLETE CBC AUTOMATED: CPT

## 2021-09-20 PROCEDURE — 85379 FIBRIN DEGRADATION QUANT: CPT

## 2021-09-20 PROCEDURE — 80048 BASIC METABOLIC PNL TOTAL CA: CPT

## 2021-09-20 RX ORDER — PANTOPRAZOLE SODIUM 40 MG/1
40 TABLET, DELAYED RELEASE ORAL
Status: DISCONTINUED | OUTPATIENT
Start: 2021-09-21 | End: 2021-10-02 | Stop reason: HOSPADM

## 2021-09-20 RX ORDER — FLUTICASONE PROPIONATE 50 MCG
2 SPRAY, SUSPENSION (ML) NASAL 2 TIMES DAILY
Status: DISCONTINUED | OUTPATIENT
Start: 2021-09-21 | End: 2021-10-02 | Stop reason: HOSPADM

## 2021-09-20 RX ADMIN — OXYCODONE HYDROCHLORIDE AND ACETAMINOPHEN 2000 MG: 500 TABLET ORAL at 10:00

## 2021-09-20 RX ADMIN — ENOXAPARIN SODIUM 40 MG: 40 INJECTION SUBCUTANEOUS at 10:01

## 2021-09-20 RX ADMIN — Medication 10 ML: at 15:22

## 2021-09-20 RX ADMIN — Medication 10 ML: at 06:34

## 2021-09-20 RX ADMIN — DEXAMETHASONE SODIUM PHOSPHATE 6 MG: 10 INJECTION, SOLUTION INTRAMUSCULAR; INTRAVENOUS at 10:00

## 2021-09-20 RX ADMIN — GUAIFENESIN 600 MG: 600 TABLET ORAL at 21:47

## 2021-09-20 RX ADMIN — GUAIFENESIN 600 MG: 600 TABLET ORAL at 10:00

## 2021-09-20 RX ADMIN — BARICITINIB 4 MG: 2 TABLET, FILM COATED ORAL at 15:30

## 2021-09-20 RX ADMIN — ACETYLCYSTEINE 600 MG: 200 SOLUTION ORAL; RESPIRATORY (INHALATION) at 10:01

## 2021-09-20 RX ADMIN — TEMAZEPAM 15 MG: 15 CAPSULE ORAL at 21:47

## 2021-09-20 RX ADMIN — ZINC SULFATE 220 MG (50 MG) CAPSULE 1 CAPSULE: CAPSULE at 10:00

## 2021-09-20 RX ADMIN — ACETYLCYSTEINE 600 MG: 200 SOLUTION ORAL; RESPIRATORY (INHALATION) at 16:36

## 2021-09-20 RX ADMIN — VITAMIN D, TAB 1000IU (100/BT) 4000 UNITS: 25 TAB at 10:26

## 2021-09-20 RX ADMIN — Medication 10 ML: at 21:47

## 2021-09-20 NOTE — PROGRESS NOTES
CM spoke with patient by phone for discharge planning needs. Demographics verified and updated. Patient lives with his spouse, Miguel Leary ([phone]), in a one level home with 3 steps to enter. Patient states he is independent at baseline, ambulates without assistive devices and drives. DME: none    PCP: None. Patient is agreeable to referral to [facility] for assistance finding new PCP. Referral placed. Patient verbalized understanding that [facility] will contact him after d/c with new PCP information. [facility] on [address] in [place]; no difficulties obtaining medications    Patient plans to return home at discharge. No PT/OT orders have been placed at this time. CM will follow to assist with needs that may arise. Care Management Interventions  PCP Verified by CM:  Yes [facility] referral placed)  Mode of Transport at Discharge: Self  Discharge Durable Medical Equipment: No  Physical Therapy Consult: No  Occupational Therapy Consult: No  Speech Therapy Consult: No  Support Systems: Spouse/Significant Other  Confirm Follow Up Transport: Family  Discharge Location  Discharge Placement: Home Unknown if ever smoked

## 2021-09-20 NOTE — PROGRESS NOTES
Hourly rounding completed on this shift. No new complaints at this time. All needs met. Pt is currently resting in bed on airvo 55L/80%. Will give report to oncoming nurse.

## 2021-09-20 NOTE — PROGRESS NOTES
Hourly rounds complete this shift, no new complaints at this time, 15L HFNC at 91%: bed in low, locked position, call light and bedside table within reach,  all needs met. Will continue to monitor Report to day shift nurse.

## 2021-09-20 NOTE — PROGRESS NOTES
Hospitalist Progress Note   Admit Date:  2021  5:52 AM   Name:  Lourdes Garsia   Age:  64 y.o. Sex:  male  :  1965   MRN:  [id number]   Room:  [facility]    Presenting Complaint: Shortness of Breath    Reason(s) for Admission: Acute hypoxemic respiratory failure due to COVID-19 ([facility] [U07.1, J96.01]     Hospital Course & Interval History:   Lourdes Garsia is a 64 y.o. male with no significant past medical history who presented to ED with worsening shortness of breath and fatigue. Reports symptoms started around  and was tested positive for COVID19 on . His symptoms were mild initially and therefore he did not seek any medical evaluation. He was seen on  in the [address] [place] [address] ED with worsening lethargy, shortness of breath. He was saturating well on room air and therefore was discharged home with Decadron. He reports that he took all but 2 pills of Decadron since then but his symptoms have worsened. He has been checking his oxygen saturation at home and has been consistently staying in mid 80s past couple days. Also reporting poor appetite, loss of taste and smell, nausea, dry cough, fever and chills. In the ED, patient was found to be 84% on room air. Saturation improved with O2 supplement. Currently on 15 L high flow nasal cannula plus nonrebreather to maintain saturation of 94%. Laboratory work up is remarkable for WBC 11.2, BUN 16, creatinine 1.03,  Calcitonin of 0.14, CRP of 6.6 x-ray with diffuse bilateral lung infiltrates. EKG showed sinus rhythm with nonspecific T and ST wave abnormalities. SARS-CoV-2 PCR ordered 2021 still pending!!!!.     Subjective (21):  \"I just don't feel well.  56y.o WM sitting up in bed with persistent complaints of dyspnea and malaise.   Was weaned from 15L to 14L via NC earlier this AM but ~13:00 pt desaturated to 85% on 15L and now being transitioned to Airvo    Review of Systems:  10 systems reviewed and negative except as noted in HPI. Assessment & Plan:     Principal Problem:    Acute hypoxemic respiratory [facility] (9/18/2021)  - Airvo initiated  - CT chest yesterday NEG for acute PE  - d/w with pharmacy, aware PCR still pending but will move forward with starting baricitinib today  - cont decadron      PUD PPX  - protonix 40mg daily      Dispo/Discharge Planning:    - anticipate at least 2 midnight hospitalization    Diet:  ADULT DIET Regular  DVT PPx: lovenox  Code status: Full Code    Hospital Problems as of 9/20/2021 Date Reviewed: 12/23/2020        Codes Class Noted - Resolved POA    * (Principal) Acute hypoxemic respiratory failure due to [facility]) ICD-10-CM: U07.1, J96.01  ICD-9-CM: 518.81, 079.89, 799.02  9/18/2021 - Present Yes              Objective:     Patient Vitals for the past 24 hrs:   Temp Pulse Resp BP SpO2   09/20/21 1053 98.7 °F (37.1 °C) 92 18 128/83 90 %   09/20/21 0559 98.4 °F (36.9 °C) 92 18 111/73 91 %   09/20/21 0034 98.5 °F (36.9 °C) 78 18 111/62 90 %   09/19/21 2034 97.7 °F (36.5 °C) 87 18 104/73 90 %   09/19/21 1609 97.5 °F (36.4 °C) 84  129/79 92 %     Oxygen Therapy  O2 Sat (%): 90 % (09/20/21 1053)  Pulse via Oximetry: 84 beats per minute (09/18/21 2310)  O2 Device: Hi flow nasal cannula (09/20/21 0811)  Skin Assessment: Clean, dry, & intact (09/18/21 0556)  O2 Flow Rate (L/min): 15 l/min (09/20/21 0811)    Estimated body mass index is 24.41 kg/m² as calculated from the following:    Height as of this encounter: 6' 1\" (1.854 m). Weight as of this encounter: 83.9 kg (185 lb). Intake/Output Summary (Last 24 hours) at 9/20/2021 1255  Last data filed at 9/19/2021 1343  Gross per 24 hour   Intake    Output 800 ml   Net -800 ml         Physical Exam:     General:    Ill-appearing  No overt distress  Head:  Normocephalic, atraumatic  Eyes:  Sclerae appear normal.  Pupils equally round. ENT:  Nares appear normal, no drainage.   Nasal cannula in place, no epistaxis  Neck:  No restricted ROM. Trachea midline   CV:   RRR. No m/r/g. No jugular venous distension. Lungs:   Diminished lung fields b/l with crackles to midlung; no wheezing or accessory muscles used  Abdomen: Bowel sounds present. Soft, nontender, nondistended. Extremities: No cyanosis or clubbing. No edema  Skin:     No rashes and normal coloration. Warm and dry. Neuro:  Cranial nerves II-XII grossly intact. Sensation intact  Psych:  Normal mood and affect.   Alert and oriented x3    I have reviewed ordered lab tests and independently visualized imaging below:    Last 24hr Labs:  Recent Results (from the past 24 hour(s))   METABOLIC PANEL, BASIC    Collection Time: 09/20/21  6:19 AM   Result Value Ref Range    Sodium 140 138 - 145 mmol/L    Potassium 4.0 3.5 - 5.1 mmol/L    Chloride 108 (H) 98 - 107 mmol/L    CO2 22 21 - 32 mmol/L    Anion gap 10 7 - 16 mmol/L    Glucose 90 65 - 100 mg/dL    BUN 19 6 - 23 MG/DL    Creatinine 1.03 0.8 - 1.5 MG/DL    GFR est AA >60 >60 ml/min/1.73m2    GFR est non-AA >60 >60 ml/min/1.73m2    Calcium 8.7 8.3 - 10.4 MG/DL   CBC W/O DIFF    Collection Time: 09/20/21  6:19 AM   Result Value Ref Range    WBC 14.7 (H) 4.3 - 11.1 K/uL    RBC 5.04 4.23 - 5.6 M/uL    HGB 15.5 13.6 - 17.2 g/dL    HCT 44.7 41.1 - 50.3 %    MCV 88.7 79.6 - 97.8 FL    MCH 30.8 26.1 - 32.9 PG    MCHC 34.7 31.4 - 35.0 g/dL    RDW 12.8 11.9 - 14.6 %    PLATELET 193 469 - 425 K/uL    MPV 9.8 9.4 - 12.3 FL    ABSOLUTE NRBC 0.00 0.0 - 0.2 K/uL   FERRITIN    Collection Time: 09/20/21  6:19 AM   Result Value Ref Range    Ferritin 774 (H) 8 - 388 NG/ML   D DIMER    Collection Time: 09/20/21  6:19 AM   Result Value Ref Range    D DIMER 0.76 (H) <0.56 ug/ml(FEU)       All Micro Results     Procedure Component Value Units Date/Time    CULTURE, BLOOD [[id number]] Collected: 09/18/21 6785    Order Status: Completed Specimen: Blood Updated: 09/19/21 3587     Special Requests: --        LEFT  Antecubital       Culture result: NO GROWTH AFTER 22 HOURS       CULTURE, BLOOD [[id number]] Collected: 09/18/21 0649    Order Status: Completed Specimen: Blood Updated: 09/19/21 6166     Special Requests: --        RIGHT  Antecubital       Culture result: NO GROWTH AFTER 22 HOURS             Other Studies:  CT CHEST PULMONARY EMBOLISM    Result Date: 9/19/2021  CT Chest with contrast Clinical Indication:  Subacute worsening of severe shortness of breath, hypoxic today, further evaluation of bilateral lung opacities, Covid-19 positive on 9/6/2021 Comparison:  Chest radiography yesterday, abdominal CT 12/18/2020 and 10/31/2020 Technique:  Dose reduction technique used: Automated exposure Control and/or adjustment of mA and kV according to patient size. Contiguous axial images were obtained from the neck base through the upper abdomen following the uneventful administration of 100 cc Isovue 370 intravenous contrast. Pulmonary embolus protocol was used. Coronal reconstructions were done for further evaluation of vessels. Findings:  The pulmonary arteries are normal in caliber, well opacified, demonstrating no evidence of a filling defect. Aorta is normal in caliber with no acute abnormality. No pleural or pericardial effusion, overt thoracic lymphadenopathy, or suspicious thyroid finding. Normal caliber esophagus. Bones and Limited upper abdominal views demonstrate no acute abnormalities. Tiny round hypodensities in the liver are too small to characterize, statistically benign incidental cysts in the absence of clinical suspicion Lung windows demonstrate mild diffuse geographic groundglass opacities, and scattered small areas of minimal peripheral consolidation, compatible with viral pneumonia. Central airways are patent and normal in caliber. No pneumothorax, honeycombing or bronchiectasis. .     1. No evidence of PE. 2. Bilateral infiltrates compatible with Covid-19.        Current Meds:  Current Facility-Administered Medications   Medication Dose Route Frequency    acetylcysteine (MUCOMYST) 200 mg/mL (20 %) solution 600 mg  600 mg Oral BID    ascorbic acid (vitamin C) (VITAMIN C) tablet 2,000 mg  2,000 mg Oral DAILY    cholecalciferol (VITAMIN D3) (1000 Units /25 mcg) tablet 4,000 Units  4,000 Units Oral DAILY    zinc sulfate (ZINCATE) 50 mg zinc (220 mg) capsule 1 Capsule  1 Capsule Oral DAILY    guaiFENesin-codeine (ROBITUSSIN AC) 100-10 mg/5 mL solution 10 mL  10 mL Oral Q4H PRN    temazepam (RESTORIL) capsule 15 mg  15 mg Oral QHS PRN    sodium chloride (NS) flush 5-40 mL  5-40 mL IntraVENous Q8H    sodium chloride (NS) flush 5-40 mL  5-40 mL IntraVENous PRN    acetaminophen (TYLENOL) tablet 650 mg  650 mg Oral Q6H PRN    Or    acetaminophen (TYLENOL) suppository 650 mg  650 mg Rectal Q6H PRN    polyethylene glycol (MIRALAX) packet 17 g  17 g Oral DAILY PRN    promethazine (PHENERGAN) tablet 12.5 mg  12.5 mg Oral Q6H PRN    Or    ondansetron (ZOFRAN) injection 4 mg  4 mg IntraVENous Q6H PRN    enoxaparin (LOVENOX) injection 40 mg  40 mg SubCUTAneous DAILY    dexamethasone (DECADRON) 10 mg/mL injection 6 mg  6 mg IntraVENous Q24H    guaiFENesin ER (MUCINEX) tablet 600 mg  600 mg Oral Q12H       Signed:  ANDREE Garcia    Part of this note may have been written by using a voice dictation software. The note has been proof read but may still contain some grammatical/other typographical errors.

## 2021-09-20 NOTE — ACP (ADVANCE CARE PLANNING)
Advance Care Planning     Advance Care Planning Activator (Inpatient)  Conversation Note      Date of ACP Conversation: 09/20/21     Conversation Conducted with:   Patient with Decision Making Capacity    ACP Activator: Glenny Luna RN      Health Care Decision Maker:    Current Designated Health Care Decision Maker:     Primary Decision Maker: Luis Crump - [phone]    Secondary Decision Maker: Eric Avelar   - [phone]      Care Preferences    Ventilation: \"If you were in your present state of health and suddenly became very ill and were unable to breathe on your own, what would your preference be about the use of a ventilator (breathing machine) if it were available to you? \"      If patient would desire the use of a ventilator (breathing machine), answer \"yes\", if not \"no\":yes    \"If your health worsens and it becomes clear that your chance of recovery is unlikely, what would your preference be about the use of a ventilator (breathing machine) if it were available to you? \"     Would the patient desire the use of a ventilator (breathing machine)? NO      Resuscitation  \"CPR works best to restart the heart when there is a sudden event, like a heart attack, in someone who is otherwise healthy. Unfortunately, CPR does not typically restart the heart for people who have serious health conditions or who are very sick. \"    \"In the event your heart stopped as a result of an underlying serious health condition, would you want attempts to be made to restart your heart (answer \"yes\" for attempt to resuscitate) or would you prefer a natural death (answer \"no\" for do not attempt to resuscitate)? \" no      [] Yes  [x] No   Educated Patient / Sonya Blank regarding differences between Advance Directives and portable DNR orders.     Length of ACP Conversation in minutes:  < 15 minutes    Conversation Outcomes:  [x] ACP discussion completed  [] Existing advance directive reviewed with patient; no changes to patient's previously recorded wishes     [] New Advance Directive completed   [] Portable Do Not Resuscitate prepared for Provider review and signature  [] POLST/POST/MOLST/MOST prepared for Provider review and signature      Follow-up plan:    [] Schedule follow-up conversation to continue planning  [x] Referred individual to Provider for additional questions/concerns   [] Advised patient/agent/surrogate to review completed ACP document and update if needed with changes in condition, patient preferences or care setting     [] This note routed to one or more involved healthcare providers

## 2021-09-21 LAB
ANION GAP SERPL CALC-SCNC: 9 MMOL/L (ref 7–16)
BUN SERPL-MCNC: 19 MG/DL (ref 6–23)
CALCIUM SERPL-MCNC: 8.6 MG/DL (ref 8.3–10.4)
CHLORIDE SERPL-SCNC: 107 MMOL/L (ref 98–107)
CO2 SERPL-SCNC: 22 MMOL/L (ref 21–32)
CREAT SERPL-MCNC: 0.94 MG/DL (ref 0.8–1.5)
ERYTHROCYTE [DISTWIDTH] IN BLOOD BY AUTOMATED COUNT: 12.7 % (ref 11.9–14.6)
GLUCOSE SERPL-MCNC: 106 MG/DL (ref 65–100)
HCT VFR BLD AUTO: 43.7 % (ref 41.1–50.3)
HGB BLD-MCNC: 14.7 G/DL (ref 13.6–17.2)
MCH RBC QN AUTO: 29.5 PG (ref 26.1–32.9)
MCHC RBC AUTO-ENTMCNC: 33.6 G/DL (ref 31.4–35)
MCV RBC AUTO: 87.8 FL (ref 79.6–97.8)
NRBC # BLD: 0 K/UL (ref 0–0.2)
PLATELET # BLD AUTO: 491 K/UL (ref 150–450)
PMV BLD AUTO: 10 FL (ref 9.4–12.3)
POTASSIUM SERPL-SCNC: 4.2 MMOL/L (ref 3.5–5.1)
RBC # BLD AUTO: 4.98 M/UL (ref 4.23–5.6)
SODIUM SERPL-SCNC: 138 MMOL/L (ref 138–145)
WBC # BLD AUTO: 10.8 K/UL (ref 4.3–11.1)

## 2021-09-21 PROCEDURE — 77010033711 HC HIGH FLOW OXYGEN

## 2021-09-21 PROCEDURE — 85027 COMPLETE CBC AUTOMATED: CPT

## 2021-09-21 PROCEDURE — 74011250637 HC RX REV CODE- 250/637: Performed by: INTERNAL MEDICINE

## 2021-09-21 PROCEDURE — 74011250637 HC RX REV CODE- 250/637: Performed by: PHYSICIAN ASSISTANT

## 2021-09-21 PROCEDURE — 74011000250 HC RX REV CODE- 250: Performed by: HOSPITALIST

## 2021-09-21 PROCEDURE — 74011250637 HC RX REV CODE- 250/637: Performed by: HOSPITALIST

## 2021-09-21 PROCEDURE — 80048 BASIC METABOLIC PNL TOTAL CA: CPT

## 2021-09-21 PROCEDURE — 36415 COLL VENOUS BLD VENIPUNCTURE: CPT

## 2021-09-21 PROCEDURE — 65270000029 HC RM PRIVATE

## 2021-09-21 PROCEDURE — 94760 N-INVAS EAR/PLS OXIMETRY 1: CPT

## 2021-09-21 PROCEDURE — 74011250636 HC RX REV CODE- 250/636: Performed by: INTERNAL MEDICINE

## 2021-09-21 RX ADMIN — ACETYLCYSTEINE 600 MG: 200 SOLUTION ORAL; RESPIRATORY (INHALATION) at 09:40

## 2021-09-21 RX ADMIN — Medication 10 ML: at 05:30

## 2021-09-21 RX ADMIN — FLUTICASONE PROPIONATE 2 SPRAY: 50 SPRAY, METERED NASAL at 17:04

## 2021-09-21 RX ADMIN — GUAIFENESIN 600 MG: 600 TABLET ORAL at 22:54

## 2021-09-21 RX ADMIN — Medication 10 ML: at 14:32

## 2021-09-21 RX ADMIN — BARICITINIB 4 MG: 2 TABLET, FILM COATED ORAL at 09:41

## 2021-09-21 RX ADMIN — DEXAMETHASONE SODIUM PHOSPHATE 6 MG: 10 INJECTION, SOLUTION INTRAMUSCULAR; INTRAVENOUS at 09:40

## 2021-09-21 RX ADMIN — FLUTICASONE PROPIONATE 2 SPRAY: 50 SPRAY, METERED NASAL at 09:39

## 2021-09-21 RX ADMIN — Medication 10 ML: at 22:55

## 2021-09-21 RX ADMIN — ZINC SULFATE 220 MG (50 MG) CAPSULE 1 CAPSULE: CAPSULE at 09:41

## 2021-09-21 RX ADMIN — VITAMIN D, TAB 1000IU (100/BT) 4000 UNITS: 25 TAB at 09:41

## 2021-09-21 RX ADMIN — GUAIFENESIN 600 MG: 600 TABLET ORAL at 09:42

## 2021-09-21 RX ADMIN — ENOXAPARIN SODIUM 40 MG: 40 INJECTION SUBCUTANEOUS at 09:41

## 2021-09-21 RX ADMIN — ACETYLCYSTEINE 600 MG: 200 SOLUTION ORAL; RESPIRATORY (INHALATION) at 17:03

## 2021-09-21 RX ADMIN — PANTOPRAZOLE SODIUM 40 MG: 40 TABLET, DELAYED RELEASE ORAL at 05:29

## 2021-09-21 RX ADMIN — OXYCODONE HYDROCHLORIDE AND ACETAMINOPHEN 2000 MG: 500 TABLET ORAL at 09:41

## 2021-09-21 NOTE — PROGRESS NOTES
Hospitalist Progress Note   Admit Date:  2021  5:52 AM   Name:  Diego Yuan   Age:  64 y.o. Sex:  male  :  1965   MRN:  [id number]   Room:  [facility]/    Presenting Complaint: Shortness of Breath    Reason(s) for Admission: Acute hypoxemic respiratory failure due to COVID-19 ([facility] 75.) [U07.1, J96.01]     Hospital Course & Interval History:   Diego Yuan is a 64 y.o. male with no significant past medical history who presented to ED with worsening shortness of breath and fatigue. Reports symptoms started around  and was tested positive for COVID19 on . His symptoms were mild initially and therefore he did not seek any medical evaluation. He was seen on  in the [facility] ED with worsening lethargy, shortness of breath. He was saturating well on room air and therefore was discharged home with Decadron. He reports that he took all but 2 pills of Decadron since then but his symptoms have worsened. He has been checking his oxygen saturation at home and has been consistently staying in mid 80s past couple days. Also reporting poor appetite, loss of taste and smell, nausea, dry cough, fever and chills. In the ED, patient was found to be 84% on room air. Saturation improved with O2 supplement. Currently on 15 L high flow nasal cannula plus nonrebreather to maintain saturation of 94%. Laboratory work up is remarkable for WBC 11.2, BUN 16, creatinine 1.03,  Calcitonin of 0.14, CRP of 6.6 x-ray with diffuse bilateral lung infiltrates. EKG showed sinus rhythm with nonspecific T and ST wave abnormalities. SARS-CoV-2 PCR ordered 2021 resulted 2020 POSITIVE     Subjective (21):  \"I think I feel better today. \"  56y.o WM in bed, clinically improved this AM on Airvo 60L FiO2 80%    Review of Systems:  10 systems reviewed and negative except as noted in HPI.       Assessment & Plan:     Principal Problem:    Acute hypoxemic respiratory failure due to COVID-19 [facility]) (9/18/2021)  - Airvo 60L 80%; wean as tolerate  - CT chest 9/19/2021 NEG for acute PE  - cont baricitinib   - cont decadron  - CBC / BMP reviewed this AM, no acute abnl      PUD PPX  - protonix 40mg daily      Dispo/Discharge Planning:    - anticipate at least 2 midnight hospitalization    Diet:  ADULT DIET Regular  DVT PPx: lovenox  Code status: Full Code    Hospital Problems as of 9/21/2021 Date Reviewed: 12/23/2020        Codes Class Noted - Resolved POA    * (Principal) Acute hypoxemic respiratory failure due to COVID-19 [facility]) ICD-10-CM: U07.1, J96.01  ICD-9-CM: 518.81, 079.89, 799.02  9/18/2021 - Present Yes              Objective:     Patient Vitals for the past 24 hrs:   Temp Pulse Resp BP SpO2   09/21/21 1053 98.1 °F (36.7 °C) 70 25 113/78 93 %   09/21/21 0805     95 %   09/21/21 0647 97.8 °F (36.6 °C) 61 18 120/77 95 %   09/21/21 0402 98 °F (36.7 °C) 76 18 109/68 98 %   09/20/21 2328 97.9 °F (36.6 °C) 68 18 112/67 96 %   09/20/21 2123     91 %   09/20/21 1920 97.7 °F (36.5 °C) 73 17 119/83 95 %   09/20/21 1553 98 °F (36.7 °C) 72 18 122/83 97 %     Oxygen Therapy  O2 Sat (%): 93 % (09/21/21 1053)  Pulse via Oximetry: 72 beats per minute (09/21/21 0805)  O2 Device: Heated; Hi flow nasal cannula (09/21/21 0805)  Skin Assessment: Clean, dry, & intact (09/18/21 0556)  O2 Flow Rate (L/min): 60 l/min (09/21/21 0805)  O2 Temperature: 87.8 °F (31 °C) (09/21/21 0805)  FIO2 (%): 85 % (09/21/21 0805)    Estimated body mass index is 24.37 kg/m² as calculated from the following:    Height as of this encounter: 6' 1\" (1.854 m). Weight as of this encounter: 83.8 kg (184 lb 11.9 oz). Intake/Output Summary (Last 24 hours) at 9/21/2021 1351  Last data filed at 9/21/2021 1245  Gross per 24 hour   Intake 480 ml   Output 2825 ml   Net -2345 ml         Physical Exam:     General:    Well developed, well nourished.  No overt distress  Head:  Normocephalic, atraumatic  Eyes:  Sclerae appear normal.  Pupils equally round.  ENT:  Nares appear normal, no drainage. Nasal cannula in place, no epistaxis  Neck:  No restricted ROM. Trachea midline   CV:   RRR. No m/r/g. No jugular venous distension. Lungs:   Diminished lung fields b/l with crackles to midlung; no wheezing or accessory muscles used  Abdomen: Bowel sounds present. Soft, nontender, nondistended. Extremities: No cyanosis or clubbing. No edema  Skin:     No rashes and normal coloration. Warm and dry. Neuro:  Cranial nerves II-XII grossly intact. Sensation intact  Psych:  Normal mood and affect.   Alert and oriented x3    I have reviewed ordered lab tests and independently visualized imaging below:    Last 24hr Labs:  Recent Results (from the past 24 hour(s))   METABOLIC PANEL, BASIC    Collection Time: 09/21/21  7:05 AM   Result Value Ref Range    Sodium 138 138 - 145 mmol/L    Potassium 4.2 3.5 - 5.1 mmol/L    Chloride 107 98 - 107 mmol/L    CO2 22 21 - 32 mmol/L    Anion gap 9 7 - 16 mmol/L    Glucose 106 (H) 65 - 100 mg/dL    BUN 19 6 - 23 MG/DL    Creatinine 0.94 0.8 - 1.5 MG/DL    GFR est AA >60 >60 ml/min/1.73m2    GFR est non-AA >60 >60 ml/min/1.73m2    Calcium 8.6 8.3 - 10.4 MG/DL   CBC W/O DIFF    Collection Time: 09/21/21  7:05 AM   Result Value Ref Range    WBC 10.8 4.3 - 11.1 K/uL    RBC 4.98 4.23 - 5.6 M/uL    HGB 14.7 13.6 - 17.2 g/dL    HCT 43.7 41.1 - 50.3 %    MCV 87.8 79.6 - 97.8 FL    MCH 29.5 26.1 - 32.9 PG    MCHC 33.6 31.4 - 35.0 g/dL    RDW 12.7 11.9 - 14.6 %    PLATELET 189 (H) 191 - 450 K/uL    MPV 10.0 9.4 - 12.3 FL    ABSOLUTE NRBC 0.00 0.0 - 0.2 K/uL       All Micro Results     Procedure Component Value Units Date/Time    CULTURE, BLOOD [[id number]] Collected: 09/18/21 5155    Order Status: Completed Specimen: Blood Updated: 09/21/21 1109     Special Requests: --        LEFT  Antecubital       Culture result: NO GROWTH 3 DAYS       CULTURE, BLOOD [[id number]] Collected: 09/18/21 0649    Order Status: Completed Specimen: Blood Updated: 09/21/21 1100     Special Requests: --        RIGHT  Antecubital       Culture result: NO GROWTH 3 DAYS             Other Studies:  No results found. Current Meds:  Current Facility-Administered Medications   Medication Dose Route Frequency    pantoprazole (PROTONIX) tablet 40 mg  40 mg Oral ACB    baricitinib (OLUMIANT) tablet 4 mg  4 mg Oral DAILY    fluticasone propionate (FLONASE) 50 mcg/actuation nasal spray 2 Spray  2 Spray Both Nostrils BID    sodium chloride (OCEAN) 0.65 % nasal squeeze bottle 2 Spray  2 Spray Both Nostrils Q2H PRN    acetylcysteine (MUCOMYST) 200 mg/mL (20 %) solution 600 mg  600 mg Oral BID    ascorbic acid (vitamin C) (VITAMIN C) tablet 2,000 mg  2,000 mg Oral DAILY    cholecalciferol (VITAMIN D3) (1000 Units /25 mcg) tablet 4,000 Units  4,000 Units Oral DAILY    zinc sulfate (ZINCATE) 50 mg zinc (220 mg) capsule 1 Capsule  1 Capsule Oral DAILY    guaiFENesin-codeine (ROBITUSSIN AC) 100-10 mg/5 mL solution 10 mL  10 mL Oral Q4H PRN    temazepam (RESTORIL) capsule 15 mg  15 mg Oral QHS PRN    sodium chloride (NS) flush 5-40 mL  5-40 mL IntraVENous Q8H    sodium chloride (NS) flush 5-40 mL  5-40 mL IntraVENous PRN    acetaminophen (TYLENOL) tablet 650 mg  650 mg Oral Q6H PRN    Or    acetaminophen (TYLENOL) suppository 650 mg  650 mg Rectal Q6H PRN    polyethylene glycol (MIRALAX) packet 17 g  17 g Oral DAILY PRN    promethazine (PHENERGAN) tablet 12.5 mg  12.5 mg Oral Q6H PRN    Or    ondansetron (ZOFRAN) injection 4 mg  4 mg IntraVENous Q6H PRN    enoxaparin (LOVENOX) injection 40 mg  40 mg SubCUTAneous DAILY    dexamethasone (DECADRON) 10 mg/mL injection 6 mg  6 mg IntraVENous Q24H    guaiFENesin ER (MUCINEX) tablet 600 mg  600 mg Oral Q12H       Signed:  ANDREE Garcia    Part of this note may have been written by using a voice dictation software. The note has been proof read but may still contain some grammatical/other typographical errors.

## 2021-09-21 NOTE — PROGRESS NOTES
Hourly rounds complete this shift, no new complaints at this time, Patient is on Airvo 55L/80%  bed in low, locked position, call light and bedside table within reach,  all needs met. Will continue to monitor Report to day shift nurse.

## 2021-09-22 PROBLEM — G47.01 SLEEP DISORDER DUE TO A GENERAL MEDICAL CONDITION, INSOMNIA TYPE: Status: ACTIVE | Noted: 2021-09-22

## 2021-09-22 LAB
ANION GAP SERPL CALC-SCNC: 10 MMOL/L (ref 7–16)
BUN SERPL-MCNC: 19 MG/DL (ref 6–23)
CALCIUM SERPL-MCNC: 8.6 MG/DL (ref 8.3–10.4)
CHLORIDE SERPL-SCNC: 106 MMOL/L (ref 98–107)
CO2 SERPL-SCNC: 22 MMOL/L (ref 21–32)
CREAT SERPL-MCNC: 0.91 MG/DL (ref 0.8–1.5)
ERYTHROCYTE [DISTWIDTH] IN BLOOD BY AUTOMATED COUNT: 12.5 % (ref 11.9–14.6)
GLUCOSE SERPL-MCNC: 94 MG/DL (ref 65–100)
HCT VFR BLD AUTO: 43.9 % (ref 41.1–50.3)
HGB BLD-MCNC: 14.8 G/DL (ref 13.6–17.2)
MCH RBC QN AUTO: 29.3 PG (ref 26.1–32.9)
MCHC RBC AUTO-ENTMCNC: 33.7 G/DL (ref 31.4–35)
MCV RBC AUTO: 86.9 FL (ref 79.6–97.8)
NRBC # BLD: 0 K/UL (ref 0–0.2)
PLATELET # BLD AUTO: 500 K/UL (ref 150–450)
PMV BLD AUTO: 9.9 FL (ref 9.4–12.3)
POTASSIUM SERPL-SCNC: 4.2 MMOL/L (ref 3.5–5.1)
RBC # BLD AUTO: 5.05 M/UL (ref 4.23–5.6)
SODIUM SERPL-SCNC: 138 MMOL/L (ref 136–145)
WBC # BLD AUTO: 17.1 K/UL (ref 4.3–11.1)

## 2021-09-22 PROCEDURE — 94640 AIRWAY INHALATION TREATMENT: CPT

## 2021-09-22 PROCEDURE — 36415 COLL VENOUS BLD VENIPUNCTURE: CPT

## 2021-09-22 PROCEDURE — 74011000250 HC RX REV CODE- 250: Performed by: HOSPITALIST

## 2021-09-22 PROCEDURE — 74011250636 HC RX REV CODE- 250/636: Performed by: INTERNAL MEDICINE

## 2021-09-22 PROCEDURE — 74011250637 HC RX REV CODE- 250/637: Performed by: HOSPITALIST

## 2021-09-22 PROCEDURE — 94760 N-INVAS EAR/PLS OXIMETRY 1: CPT

## 2021-09-22 PROCEDURE — 74011250637 HC RX REV CODE- 250/637: Performed by: INTERNAL MEDICINE

## 2021-09-22 PROCEDURE — 74011250637 HC RX REV CODE- 250/637: Performed by: FAMILY MEDICINE

## 2021-09-22 PROCEDURE — 85027 COMPLETE CBC AUTOMATED: CPT

## 2021-09-22 PROCEDURE — 77010033711 HC HIGH FLOW OXYGEN

## 2021-09-22 PROCEDURE — 74011250637 HC RX REV CODE- 250/637: Performed by: PHYSICIAN ASSISTANT

## 2021-09-22 PROCEDURE — 65270000029 HC RM PRIVATE

## 2021-09-22 PROCEDURE — 80048 BASIC METABOLIC PNL TOTAL CA: CPT

## 2021-09-22 PROCEDURE — 94664 DEMO&/EVAL PT USE INHALER: CPT

## 2021-09-22 RX ORDER — TRAZODONE HYDROCHLORIDE 50 MG/1
100 TABLET ORAL
Status: DISCONTINUED | OUTPATIENT
Start: 2021-09-22 | End: 2021-09-23

## 2021-09-22 RX ORDER — GUAIFENESIN 600 MG/1
1200 TABLET, EXTENDED RELEASE ORAL EVERY 12 HOURS
Status: DISCONTINUED | OUTPATIENT
Start: 2021-09-22 | End: 2021-10-02 | Stop reason: HOSPADM

## 2021-09-22 RX ORDER — BUDESONIDE AND FORMOTEROL FUMARATE DIHYDRATE 160; 4.5 UG/1; UG/1
2 AEROSOL RESPIRATORY (INHALATION)
Status: DISCONTINUED | OUTPATIENT
Start: 2021-09-22 | End: 2021-10-02 | Stop reason: HOSPADM

## 2021-09-22 RX ADMIN — FLUTICASONE PROPIONATE 2 SPRAY: 50 SPRAY, METERED NASAL at 09:51

## 2021-09-22 RX ADMIN — GUAIFENESIN 600 MG: 600 TABLET ORAL at 08:10

## 2021-09-22 RX ADMIN — FLUTICASONE PROPIONATE 2 SPRAY: 50 SPRAY, METERED NASAL at 17:04

## 2021-09-22 RX ADMIN — BUDESONIDE AND FORMOTEROL FUMARATE DIHYDRATE 2 PUFF: 160; 4.5 AEROSOL RESPIRATORY (INHALATION) at 20:00

## 2021-09-22 RX ADMIN — ACETYLCYSTEINE 600 MG: 200 SOLUTION ORAL; RESPIRATORY (INHALATION) at 08:10

## 2021-09-22 RX ADMIN — OXYCODONE HYDROCHLORIDE AND ACETAMINOPHEN 2000 MG: 500 TABLET ORAL at 08:10

## 2021-09-22 RX ADMIN — GUAIFENESIN AND CODEINE PHOSPHATE 10 ML: 100; 10 SOLUTION ORAL at 04:42

## 2021-09-22 RX ADMIN — BARICITINIB 4 MG: 2 TABLET, FILM COATED ORAL at 09:19

## 2021-09-22 RX ADMIN — Medication 10 ML: at 21:20

## 2021-09-22 RX ADMIN — Medication 10 ML: at 05:41

## 2021-09-22 RX ADMIN — TRAZODONE HYDROCHLORIDE 100 MG: 50 TABLET ORAL at 21:20

## 2021-09-22 RX ADMIN — PANTOPRAZOLE SODIUM 40 MG: 40 TABLET, DELAYED RELEASE ORAL at 05:41

## 2021-09-22 RX ADMIN — TEMAZEPAM 15 MG: 15 CAPSULE ORAL at 23:46

## 2021-09-22 RX ADMIN — DEXAMETHASONE SODIUM PHOSPHATE 6 MG: 10 INJECTION, SOLUTION INTRAMUSCULAR; INTRAVENOUS at 08:10

## 2021-09-22 RX ADMIN — Medication 10 ML: at 14:44

## 2021-09-22 RX ADMIN — ZINC SULFATE 220 MG (50 MG) CAPSULE 1 CAPSULE: CAPSULE at 09:00

## 2021-09-22 RX ADMIN — VITAMIN D, TAB 1000IU (100/BT) 4000 UNITS: 25 TAB at 08:10

## 2021-09-22 RX ADMIN — ENOXAPARIN SODIUM 40 MG: 40 INJECTION SUBCUTANEOUS at 08:10

## 2021-09-22 RX ADMIN — GUAIFENESIN 1200 MG: 600 TABLET ORAL at 21:20

## 2021-09-22 NOTE — PROGRESS NOTES
Patient sitting up in chair watching TV. On Airvo 45L/65%. No c/o pain or discomfort voiced. Hourly rounds performed this shift. Bed lowered and locked. Call light within reach. All needs met at this time. Bedside shift report will be given to oncoming nurse.

## 2021-09-22 NOTE — PROGRESS NOTES
CM chart review for discharge planning. Patient currently requiring Airvo at 50L/75%. Patient plans to return home with spouse at discharge. Referral has been placed to [facility] for assistance establishing a PCP. No PT/OT orders have been placed at this time. CM will continue to follow to assist with discharge needs that may arise. Other Patient is calling requesting a call back from the office regarding surgery questions.   [phone]

## 2021-09-22 NOTE — PROGRESS NOTES
Hourly rounds complete this shift, no new complaints at this time, : bed in low, locked position, Patient on airvo 50L/75 call light and bedside table within reach,  all needs met. Will continue to monitor Report to day shift nurse.

## 2021-09-22 NOTE — PROGRESS NOTES
Hospitalist Progress Note   Admit Date:  2021  5:52 AM   Name:  Nish Tomas   Age:  64 y.o. Sex:  male  :  1965   MRN:  [id number]   Room:  [facility]    Presenting Complaint: Shortness of Breath    Reason(s) for Admission: Acute hypoxemic respiratory failure due to COVID-19 ([facility] 75.) [U07.1, J96.01]     Hospital Course & Interval History:   Hector Pi a 64 y. o. male with no significant past medical history who presented to ED with worsening shortness of breath and fatigue. Reports symptoms started around  and was tested positive for COVID19 on .  His symptoms were mild initially and therefore he did not seek any medical evaluation. Cesar Rico was seen on  in the [facility] ED with worsening lethargy, shortness of breath.  He was saturating well on room air and therefore was discharged home with Decadron.  He reports that he took all but 2 pills of Decadron since then but his symptoms have worsened. Cesar Rico has been checking his oxygen saturation at home and has been consistently staying in mid 80s past couple days. Saman Ferreira reporting poor appetite, loss of taste and smell, nausea, dry cough, fever and chills.       In the ED, patient was found to be 84% on room air.  Saturation improved with O2 supplement.  Currently on 15 L high flow nasal cannula plus nonrebreather to maintain saturation of 94%.  Laboratory work up is remarkable for WBC 11.2, BUN 16, creatinine 1.03,  proCalcitonin of 0.14, CRP of 6.6 x-ray with diffuse bilateral lung infiltrates.  EKG showed sinus rhythm with nonspecific T and ST wave abnormalities.        SARS-CoV-2 PCR ordered 2021 resulted 2020 POSITIVE    Subjective (21): No acute events ON. C/o poor sleep. +cough, +SOB unchanged   Assessment & Plan:     Principal Problem:    Acute hypoxemic respiratory failure due to COVID-19     COVID+ (PCR+)   Improving.  Down to 50L/75%   - CT chest 2021 NEG for acute PE  - cont baricitinib   - cont decadron  - awake proning   - add symnicort. incrase mucinex. Insomnia 2/2 [facility] - start trazodone nightly         Dispo/Discharge Planning:      Home when stable     Diet:  ADULT DIET Regular  DVT PPx: lovenox   Code status: Full Code    Hospital Problems as of 9/22/2021 Date Reviewed: 12/23/2020        Codes Class Noted - Resolved POA    * (Principal) Acute hypoxemic respiratory failure due to COVID-19 [facility]) ICD-10-CM: U07.1, J96.01  ICD-9-CM: 518.81, 079.89, 799.02  9/18/2021 - Present Yes              Objective:     Patient Vitals for the past 24 hrs:   Temp Pulse Resp BP SpO2   09/22/21 1127 98.8 °F (37.1 °C) 97 20 128/81 92 %   09/22/21 0926     91 %   09/22/21 0724 98.8 °F (37.1 °C) 81 18 115/73 91 %   09/22/21 0424 98.6 °F (37 °C) 67 18 117/80 90 %   09/21/21 2240 97.9 °F (36.6 °C) 65 18 (!) 123/91 97 %   09/21/21 2205     96 %   09/21/21 1910 98 °F (36.7 °C) 68 18 119/85 94 %   09/21/21 1909 98 °F (36.7 °C) 68 18 (!) 120/92 95 %   09/21/21 1549 97.6 °F (36.4 °C) 75 20 127/87 95 %   09/21/21 1417     93 %     Oxygen Therapy  O2 Sat (%): 92 % (09/22/21 1127)  Pulse via Oximetry: 80 beats per minute (09/22/21 0926)  O2 Device: Heated; Hi flow nasal cannula (09/22/21 0926)  Skin Assessment: Clean, dry, & intact (09/18/21 0556)  O2 Flow Rate (L/min): 50 l/min (09/22/21 0926)  O2 Temperature: 87.8 °F (31 °C) (09/22/21 0926)  FIO2 (%): 75 % (09/22/21 0926)    Estimated body mass index is 23.68 kg/m² as calculated from the following:    Height as of this encounter: 6' 1\" (1.854 m). Weight as of this encounter: 81.4 kg (179 lb 7.3 oz). Intake/Output Summary (Last 24 hours) at 9/22/2021 1210  Last data filed at 9/22/2021 1042  Gross per 24 hour   Intake 840 ml   Output 2400 ml   Net -1560 ml         Physical Exam:   Physical Exam  Vitals and nursing note reviewed. Constitutional:       Appearance: Normal appearance. Cardiovascular:      Rate and Rhythm: Normal rate and regular rhythm. Pulmonary:      Effort: Pulmonary effort is normal. No respiratory distress. Breath sounds: Normal breath sounds. Neurological:      General: No focal deficit present. Mental Status: He is alert. I have reviewed ordered lab tests and independently visualized imaging below:    Last 24hr Labs:  Recent Results (from the past 24 hour(s))   METABOLIC PANEL, BASIC    Collection Time: 09/22/21  5:57 AM   Result Value Ref Range    Sodium 138 136 - 145 mmol/L    Potassium 4.2 3.5 - 5.1 mmol/L    Chloride 106 98 - 107 mmol/L    CO2 22 21 - 32 mmol/L    Anion gap 10 7 - 16 mmol/L    Glucose 94 65 - 100 mg/dL    BUN 19 6 - 23 MG/DL    Creatinine 0.91 0.8 - 1.5 MG/DL    GFR est AA >60 >60 ml/min/1.73m2    GFR est non-AA >60 >60 ml/min/1.73m2    Calcium 8.6 8.3 - 10.4 MG/DL   CBC W/O DIFF    Collection Time: 09/22/21  5:57 AM   Result Value Ref Range    WBC 17.1 (H) 4.3 - 11.1 K/uL    RBC 5.05 4.23 - 5.6 M/uL    HGB 14.8 13.6 - 17.2 g/dL    HCT 43.9 41.1 - 50.3 %    MCV 86.9 79.6 - 97.8 FL    MCH 29.3 26.1 - 32.9 PG    MCHC 33.7 31.4 - 35.0 g/dL    RDW 12.5 11.9 - 14.6 %    PLATELET 405 (H) 958 - 450 K/uL    MPV 9.9 9.4 - 12.3 FL    ABSOLUTE NRBC 0.00 0.0 - 0.2 K/uL       All Micro Results     Procedure Component Value Units Date/Time    CULTURE, BLOOD [[id number]] Collected: 09/18/21 0806    Order Status: Completed Specimen: Blood Updated: 09/22/21 1133     Special Requests: --        LEFT  Antecubital       Culture result: NO GROWTH 4 DAYS       CULTURE, BLOOD [[id number]] Collected: 09/18/21 0649    Order Status: Completed Specimen: Blood Updated: 09/22/21 1133     Special Requests: --        RIGHT  Antecubital       Culture result: NO GROWTH 4 DAYS             Other Studies:  No results found.     Current Meds:  Current Facility-Administered Medications   Medication Dose Route Frequency    traZODone (DESYREL) tablet 100 mg  100 mg Oral QHS    guaiFENesin ER (MUCINEX) tablet 1,200 mg  1,200 mg Oral Q12H    budesonide-formoteroL (SYMBICORT) 160-4.5 mcg/actuation HFA inhaler 2 Puff  2 Puff Inhalation BID RT    pantoprazole (PROTONIX) tablet 40 mg  40 mg Oral ACB    baricitinib (OLUMIANT) tablet 4 mg  4 mg Oral DAILY    fluticasone propionate (FLONASE) 50 mcg/actuation nasal spray 2 Spray  2 Spray Both Nostrils BID    sodium chloride (OCEAN) 0.65 % nasal squeeze bottle 2 Spray  2 Spray Both Nostrils Q2H PRN    ascorbic acid (vitamin C) (VITAMIN C) tablet 2,000 mg  2,000 mg Oral DAILY    cholecalciferol (VITAMIN D3) (1000 Units /25 mcg) tablet 4,000 Units  4,000 Units Oral DAILY    zinc sulfate (ZINCATE) 50 mg zinc (220 mg) capsule 1 Capsule  1 Capsule Oral DAILY    guaiFENesin-codeine (ROBITUSSIN AC) 100-10 mg/5 mL solution 10 mL  10 mL Oral Q4H PRN    temazepam (RESTORIL) capsule 15 mg  15 mg Oral QHS PRN    sodium chloride (NS) flush 5-40 mL  5-40 mL IntraVENous Q8H    sodium chloride (NS) flush 5-40 mL  5-40 mL IntraVENous PRN    acetaminophen (TYLENOL) tablet 650 mg  650 mg Oral Q6H PRN    Or    acetaminophen (TYLENOL) suppository 650 mg  650 mg Rectal Q6H PRN    polyethylene glycol (MIRALAX) packet 17 g  17 g Oral DAILY PRN    promethazine (PHENERGAN) tablet 12.5 mg  12.5 mg Oral Q6H PRN    Or    ondansetron (ZOFRAN) injection 4 mg  4 mg IntraVENous Q6H PRN    enoxaparin (LOVENOX) injection 40 mg  40 mg SubCUTAneous DAILY    dexamethasone (DECADRON) 10 mg/mL injection 6 mg  6 mg IntraVENous Q24H       Signed:  Azalea Sharp DO    Part of this note may have been written by using a voice dictation software. The note has been proof read but may still contain some grammatical/other typographical errors.

## 2021-09-23 PROBLEM — E88.09 HYPOALBUMINEMIA: Status: ACTIVE | Noted: 2021-09-23

## 2021-09-23 PROBLEM — U07.1 PNEUMONIA DUE TO COVID-19 VIRUS: Status: ACTIVE | Noted: 2021-09-23

## 2021-09-23 PROBLEM — D75.839 THROMBOCYTOSIS: Status: ACTIVE | Noted: 2021-09-23

## 2021-09-23 PROBLEM — R74.01 TRANSAMINITIS: Status: ACTIVE | Noted: 2021-09-23

## 2021-09-23 PROBLEM — J12.82 PNEUMONIA DUE TO COVID-19 VIRUS: Status: ACTIVE | Noted: 2021-09-23

## 2021-09-23 LAB
ALBUMIN SERPL-MCNC: 2.2 G/DL (ref 3.5–5)
ALBUMIN/GLOB SERPL: 0.5 {RATIO} (ref 1.2–3.5)
ALP SERPL-CCNC: 204 U/L (ref 50–136)
ALT SERPL-CCNC: 61 U/L (ref 12–65)
ANION GAP SERPL CALC-SCNC: 11 MMOL/L (ref 7–16)
AST SERPL-CCNC: 21 U/L (ref 15–37)
BACTERIA SPEC CULT: NORMAL
BACTERIA SPEC CULT: NORMAL
BILIRUB SERPL-MCNC: 0.5 MG/DL (ref 0.2–1.1)
BUN SERPL-MCNC: 23 MG/DL (ref 6–23)
CALCIUM SERPL-MCNC: 8.6 MG/DL (ref 8.3–10.4)
CHLORIDE SERPL-SCNC: 106 MMOL/L (ref 98–107)
CO2 SERPL-SCNC: 21 MMOL/L (ref 21–32)
CREAT SERPL-MCNC: 0.93 MG/DL (ref 0.8–1.5)
CRP SERPL-MCNC: 5 MG/DL (ref 0–0.9)
D DIMER PPP FEU-MCNC: 0.41 UG/ML(FEU)
ERYTHROCYTE [DISTWIDTH] IN BLOOD BY AUTOMATED COUNT: 12.4 % (ref 11.9–14.6)
GLOBULIN SER CALC-MCNC: 4.6 G/DL (ref 2.3–3.5)
GLUCOSE SERPL-MCNC: 88 MG/DL (ref 65–100)
HCT VFR BLD AUTO: 42.5 % (ref 41.1–50.3)
HGB BLD-MCNC: 14.5 G/DL (ref 13.6–17.2)
MCH RBC QN AUTO: 29.8 PG (ref 26.1–32.9)
MCHC RBC AUTO-ENTMCNC: 34.1 G/DL (ref 31.4–35)
MCV RBC AUTO: 87.3 FL (ref 79.6–97.8)
NRBC # BLD: 0 K/UL (ref 0–0.2)
PLATELET # BLD AUTO: 478 K/UL (ref 150–450)
PMV BLD AUTO: 9.8 FL (ref 9.4–12.3)
POTASSIUM SERPL-SCNC: 4.1 MMOL/L (ref 3.5–5.1)
PROCALCITONIN SERPL-MCNC: 0.06 NG/ML
PROT SERPL-MCNC: 6.8 G/DL (ref 6.3–8.2)
RBC # BLD AUTO: 4.87 M/UL (ref 4.23–5.6)
SERVICE CMNT-IMP: NORMAL
SERVICE CMNT-IMP: NORMAL
SODIUM SERPL-SCNC: 138 MMOL/L (ref 138–145)
WBC # BLD AUTO: 13.4 K/UL (ref 4.3–11.1)

## 2021-09-23 PROCEDURE — 84145 PROCALCITONIN (PCT): CPT

## 2021-09-23 PROCEDURE — 36415 COLL VENOUS BLD VENIPUNCTURE: CPT

## 2021-09-23 PROCEDURE — 77010033711 HC HIGH FLOW OXYGEN

## 2021-09-23 PROCEDURE — 86140 C-REACTIVE PROTEIN: CPT

## 2021-09-23 PROCEDURE — 74011250636 HC RX REV CODE- 250/636: Performed by: INTERNAL MEDICINE

## 2021-09-23 PROCEDURE — 74011250637 HC RX REV CODE- 250/637: Performed by: PHYSICIAN ASSISTANT

## 2021-09-23 PROCEDURE — 94760 N-INVAS EAR/PLS OXIMETRY 1: CPT

## 2021-09-23 PROCEDURE — 74011250637 HC RX REV CODE- 250/637: Performed by: HOSPITALIST

## 2021-09-23 PROCEDURE — 65270000029 HC RM PRIVATE

## 2021-09-23 PROCEDURE — 85027 COMPLETE CBC AUTOMATED: CPT

## 2021-09-23 PROCEDURE — 74011250637 HC RX REV CODE- 250/637: Performed by: FAMILY MEDICINE

## 2021-09-23 PROCEDURE — 85379 FIBRIN DEGRADATION QUANT: CPT

## 2021-09-23 PROCEDURE — 80053 COMPREHEN METABOLIC PANEL: CPT

## 2021-09-23 PROCEDURE — 94640 AIRWAY INHALATION TREATMENT: CPT

## 2021-09-23 RX ORDER — CETIRIZINE HCL 10 MG
10 TABLET ORAL DAILY
Status: DISCONTINUED | OUTPATIENT
Start: 2021-09-24 | End: 2021-09-26

## 2021-09-23 RX ORDER — TRAZODONE HYDROCHLORIDE 50 MG/1
200 TABLET ORAL
Status: DISCONTINUED | OUTPATIENT
Start: 2021-09-23 | End: 2021-10-02 | Stop reason: HOSPADM

## 2021-09-23 RX ADMIN — GUAIFENESIN 1200 MG: 600 TABLET ORAL at 08:59

## 2021-09-23 RX ADMIN — PANTOPRAZOLE SODIUM 40 MG: 40 TABLET, DELAYED RELEASE ORAL at 05:30

## 2021-09-23 RX ADMIN — TRAZODONE HYDROCHLORIDE 200 MG: 50 TABLET ORAL at 22:53

## 2021-09-23 RX ADMIN — Medication 10 ML: at 05:30

## 2021-09-23 RX ADMIN — VITAMIN D, TAB 1000IU (100/BT) 4000 UNITS: 25 TAB at 09:00

## 2021-09-23 RX ADMIN — ZINC SULFATE 220 MG (50 MG) CAPSULE 1 CAPSULE: CAPSULE at 09:00

## 2021-09-23 RX ADMIN — DEXAMETHASONE SODIUM PHOSPHATE 6 MG: 10 INJECTION, SOLUTION INTRAMUSCULAR; INTRAVENOUS at 08:59

## 2021-09-23 RX ADMIN — BUDESONIDE AND FORMOTEROL FUMARATE DIHYDRATE 2 PUFF: 160; 4.5 AEROSOL RESPIRATORY (INHALATION) at 08:00

## 2021-09-23 RX ADMIN — Medication 10 ML: at 13:10

## 2021-09-23 RX ADMIN — OXYCODONE HYDROCHLORIDE AND ACETAMINOPHEN 2000 MG: 500 TABLET ORAL at 08:59

## 2021-09-23 RX ADMIN — FLUTICASONE PROPIONATE 2 SPRAY: 50 SPRAY, METERED NASAL at 09:00

## 2021-09-23 RX ADMIN — BUDESONIDE AND FORMOTEROL FUMARATE DIHYDRATE 2 PUFF: 160; 4.5 AEROSOL RESPIRATORY (INHALATION) at 21:00

## 2021-09-23 RX ADMIN — FLUTICASONE PROPIONATE 2 SPRAY: 50 SPRAY, METERED NASAL at 18:00

## 2021-09-23 RX ADMIN — GUAIFENESIN 1200 MG: 600 TABLET ORAL at 22:18

## 2021-09-23 RX ADMIN — BARICITINIB 4 MG: 2 TABLET, FILM COATED ORAL at 09:02

## 2021-09-23 RX ADMIN — Medication 10 ML: at 22:18

## 2021-09-23 RX ADMIN — ENOXAPARIN SODIUM 40 MG: 40 INJECTION SUBCUTANEOUS at 09:00

## 2021-09-23 NOTE — PROGRESS NOTES
Hourly rounding completed during shift. All needs met at this time. Pt on AirVo 60L 100% O2sat 94%. Bed L/L with call bell in reach. Report will be given to oncoming RN.

## 2021-09-23 NOTE — PROGRESS NOTES
Hourly rounds completed on patient, call light with in reach and bed low and locked. Pt remains on Airvo with o2 sats above 90%. NAD noted during shift. Patient denies needs at this time. Will report to oncoming nurse.

## 2021-09-23 NOTE — PROGRESS NOTES
Found pt off Optiflow and in the bathroom attempting to get in the shower. Placed pt in chair with a spo2 77%. placed 15lpm hfnc on with spo2 increasing to 92%. Pt is insisting on taking a shower and refusing to put optiflow back on. With the help of Cna, pt placed in shower on 15lpm hfnc x 5 minutes. Placed back in chair at placed on optiflow 45l/65% with a spo2 80%. Increased fio2 to 100%. fio2 now 98%. Wean fio2 as corie. CAP 63 placed on pt.  Hussain Garcia RN at bedside

## 2021-09-23 NOTE — PROGRESS NOTES
CM following patient's chart. Previous CM sent referral to [facility] for new PCP. No PT/OT consults at this time. Patient currently on 45L/65% of HFNC. CM will continue to follow and will assist with any needs that may arise.

## 2021-09-23 NOTE — PROGRESS NOTES
Hospitalist Progress Note   Admit Date:  2021  5:52 AM   Name:  Deisi Sender   Age:  64 y.o. Sex:  male  :  1965   MRN:  [id number]   Room:  [facility]    Presenting Complaint: Shortness of Breath    Reason(s) for Admission: Acute hypoxemic respiratory failure due to COVID-19 ([facility] [U07.1, J96.01]     Hospital Course & Interval History:   Ayana Shah a 64 y. o. male with no significant past medical history who presented to ED with worsening shortness of breath and fatigue. Reports symptoms started around  and was tested positive for COVID19 on .  His symptoms were mild initially and therefore he did not seek any medical evaluation. Maru Loera was seen on  in the [facility] ED with worsening lethargy, shortness of breath.  He was saturating well on room air and therefore was discharged home with Decadron.  He reports that he took all but 2 pills of Decadron since then but his symptoms have worsened. Maru Loera has been checking his oxygen saturation at home and has been consistently staying in mid 80s past couple days. Ethelyn First reporting poor appetite, loss of taste and smell, nausea, dry cough, fever and chills.       In the ED, patient was found to be 84% on room air.  Saturation improved with O2 supplement.  Currently on 15 L high flow nasal cannula plus nonrebreather to maintain saturation of 94%.  Laboratory work up is remarkable for WBC 11.2, BUN 16, creatinine 1.03,  proCalcitonin of 0.14, CRP of 6.6 x-ray with diffuse bilateral lung infiltrates.  EKG showed sinus rhythm with nonspecific T and ST wave abnormalities.        SARS-CoV-2 PCR ordered 2021 resulted 2020 POSITIVE    Subjective (21):  Slept better last night with the scheduled trazodone. C/o congestion, rhinorrhea blood tinged. Went to bathroom to clean up without assistance and found with spo2 77%.  Placed on 15L HFNC bc he was insistent on taking a shower and when placed back on optiflow 45/60% (previous settings), he was satting 80% and fio2 increased to 100%   Assessment & Plan:     Principal Problem:    Acute hypoxemic respiratory failure due to COVID-19     COVID+9/6 (PCR+9/18)   Worse. Wean FIO2 as tolerated. - CT chest 9/19/2021 NEG for acute PE  - cont baricitinib   - cont decadron  - awake proning   - cont symbicort, mucinex, ocean spray, flonase, vitamins  - procal nil and d dimer not elevated   - discussed awake proning.   - add antihistamine for congestion.   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  Cont to monitor       Dispo/Discharge Planning:      Home when stable     Diet:  ADULT DIET Regular  DVT PPx: lovenox   Code status: Full Code    Hospital Problems as of 9/23/2021 Date Reviewed: 12/23/2020        Codes Class Noted - Resolved POA    Transaminitis ICD-10-CM: R74.01  ICD-9-CM: 790.4  9/23/2021 - Present Yes        Hypoalbuminemia ICD-10-CM: E88.09  ICD-9-CM: 273.8  9/23/2021 - Present Clinically Undetermined        Thrombocytosis ICD-10-CM: D47.3  ICD-9-CM: 238.71  9/23/2021 - Present Yes        Pneumonia due to COVID-19 virus ICD-10-CM: U07.1, J12.82  ICD-9-CM: 480.8, 079.89  9/23/2021 - Present Yes        Sleep disorder due to a general medical condition, insomnia type ICD-10-CM: G47.01  ICD-9-CM: 327.01  9/22/2021 - Present Yes        * (Principal) Acute hypoxemic respiratory failure due to COVID-19 [facility]) ICD-10-CM: U07.1, J96.01  ICD-9-CM: 518.81, 079.89, 799.02  9/18/2021 - Present Yes              Objective:     Patient Vitals for the past 24 hrs:   Temp Pulse Resp BP SpO2   09/23/21 1230 98.1 °F (36.7 °C) 76 18 120/72 95 %   09/23/21 0737 98.3 °F (36.8 °C) 78 18 110/78 94 %   09/23/21 0349 98.5 °F (36.9 °C) 65 19 109/71 94 %   09/22/21 2342 97.6 °F (36.4 °C) 85 24 139/79 94 %   09/22/21 2139     92 %   09/22/21 1916 97.9 °F (36.6 °C) 63 22 121/78 94 %   09/22/21 1810 98.1 °F (36.7 °C) 90 20 122/74 95 %     Oxygen Therapy  O2 Sat (%): 95 % (09/23/21 1230)  Pulse via Oximetry: 79 beats per minute (09/23/21 0737)  O2 Device: Heated; Hi flow nasal cannula (09/23/21 0737)  Skin Assessment: Clean, dry, & intact (09/18/21 0556)  O2 Flow Rate (L/min): 45 l/min (09/23/21 0737)  O2 Temperature: 87.8 °F (31 °C) (09/23/21 0737)  FIO2 (%): 65 % (09/23/21 0737)    Estimated body mass index is 23.68 kg/m² as calculated from the following:    Height as of this encounter: 6' 1\" (1.854 m). Weight as of this encounter: 81.4 kg (179 lb 7.3 oz). Intake/Output Summary (Last 24 hours) at 9/23/2021 1649  Last data filed at 9/23/2021 1612  Gross per 24 hour   Intake 770 ml   Output 2050 ml   Net -1280 ml         Physical Exam:   Physical Exam  Vitals and nursing note reviewed. Constitutional:       Appearance: Normal appearance. Cardiovascular:      Rate and Rhythm: Normal rate and regular rhythm. Pulmonary:      Effort: Pulmonary effort is normal. No respiratory distress. Breath sounds: Normal breath sounds. Neurological:      General: No focal deficit present. Mental Status: He is alert. I have reviewed ordered lab tests and independently visualized imaging below:    Last 24hr Labs:  Recent Results (from the past 24 hour(s))   METABOLIC PANEL, COMPREHENSIVE    Collection Time: 09/23/21  6:30 AM   Result Value Ref Range    Sodium 138 138 - 145 mmol/L    Potassium 4.1 3.5 - 5.1 mmol/L    Chloride 106 98 - 107 mmol/L    CO2 21 21 - 32 mmol/L    Anion gap 11 7 - 16 mmol/L    Glucose 88 65 - 100 mg/dL    BUN 23 6 - 23 MG/DL    Creatinine 0.93 0.8 - 1.5 MG/DL    GFR est AA >60 >60 ml/min/1.73m2    GFR est non-AA >60 >60 ml/min/1.73m2    Calcium 8.6 8.3 - 10.4 MG/DL    Bilirubin, total 0.5 0.2 - 1.1 MG/DL    ALT (SGPT) 61 12 - 65 U/L    AST (SGOT) 21 15 - 37 U/L    Alk.  phosphatase 204 (H) 50 - 136 U/L    Protein, total 6.8 6.3 - 8.2 g/dL    Albumin 2.2 (L) 3.5 - 5.0 g/dL    Globulin 4.6 (H) 2.3 - 3.5 g/dL A-G Ratio 0.5 (L) 1.2 - 3.5     CBC W/O DIFF    Collection Time: 09/23/21  6:30 AM   Result Value Ref Range    WBC 13.4 (H) 4.3 - 11.1 K/uL    RBC 4.87 4.23 - 5.6 M/uL    HGB 14.5 13.6 - 17.2 g/dL    HCT 42.5 41.1 - 50.3 %    MCV 87.3 79.6 - 97.8 FL    MCH 29.8 26.1 - 32.9 PG    MCHC 34.1 31.4 - 35.0 g/dL    RDW 12.4 11.9 - 14.6 %    PLATELET 099 (H) 522 - 450 K/uL    MPV 9.8 9.4 - 12.3 FL    ABSOLUTE NRBC 0.00 0.0 - 0.2 K/uL   C REACTIVE PROTEIN, QT    Collection Time: 09/23/21  6:30 AM   Result Value Ref Range    C-Reactive protein 5.0 (H) 0.0 - 0.9 mg/dL   D DIMER    Collection Time: 09/23/21  6:30 AM   Result Value Ref Range    D DIMER 0.41 <0.56 ug/ml(FEU)   PROCALCITONIN    Collection Time: 09/23/21  6:30 AM   Result Value Ref Range    Procalcitonin 0.06 ng/mL       All Micro Results     Procedure Component Value Units Date/Time    CULTURE, BLOOD [[id number]] Collected: 09/18/21 5022    Order Status: Completed Specimen: Blood Updated: 09/23/21 0711     Special Requests: --        LEFT  Antecubital       Culture result: NO GROWTH 5 DAYS       CULTURE, BLOOD [[id number]] Collected: 09/18/21 0649    Order Status: Completed Specimen: Blood Updated: 09/23/21 0711     Special Requests: --        RIGHT  Antecubital       Culture result: NO GROWTH 5 DAYS             Other Studies:  No results found.     Current Meds:  Current Facility-Administered Medications   Medication Dose Route Frequency    traZODone (DESYREL) tablet 200 mg  200 mg Oral QHS    [START ON 9/24/2021] cetirizine (ZYRTEC) tablet 10 mg  10 mg Oral DAILY    guaiFENesin ER (MUCINEX) tablet 1,200 mg  1,200 mg Oral Q12H    budesonide-formoteroL (SYMBICORT) 160-4.5 mcg/actuation HFA inhaler 2 Puff  2 Puff Inhalation BID RT    pantoprazole (PROTONIX) tablet 40 mg  40 mg Oral ACB    baricitinib (OLUMIANT) tablet 4 mg  4 mg Oral DAILY    fluticasone propionate (FLONASE) 50 mcg/actuation nasal spray 2 Spray  2 Spray Both Nostrils BID    sodium chloride (OCEAN) 0.65 % nasal squeeze bottle 2 Spray  2 Spray Both Nostrils Q2H PRN    ascorbic acid (vitamin C) (VITAMIN C) tablet 2,000 mg  2,000 mg Oral DAILY    cholecalciferol (VITAMIN D3) (1000 Units /25 mcg) tablet 4,000 Units  4,000 Units Oral DAILY    zinc sulfate (ZINCATE) 50 mg zinc (220 mg) capsule 1 Capsule  1 Capsule Oral DAILY    guaiFENesin-codeine (ROBITUSSIN AC) 100-10 mg/5 mL solution 10 mL  10 mL Oral Q4H PRN    sodium chloride (NS) flush 5-40 mL  5-40 mL IntraVENous Q8H    sodium chloride (NS) flush 5-40 mL  5-40 mL IntraVENous PRN    acetaminophen (TYLENOL) tablet 650 mg  650 mg Oral Q6H PRN    Or    acetaminophen (TYLENOL) suppository 650 mg  650 mg Rectal Q6H PRN    polyethylene glycol (MIRALAX) packet 17 g  17 g Oral DAILY PRN    promethazine (PHENERGAN) tablet 12.5 mg  12.5 mg Oral Q6H PRN    Or    ondansetron (ZOFRAN) injection 4 mg  4 mg IntraVENous Q6H PRN    enoxaparin (LOVENOX) injection 40 mg  40 mg SubCUTAneous DAILY    dexamethasone (DECADRON) 10 mg/mL injection 6 mg  6 mg IntraVENous Q24H       Signed:  Emy Shay DO    Part of this note may have been written by using a voice dictation software. The note has been proof read but may still contain some grammatical/other typographical errors.

## 2021-09-24 ENCOUNTER — APPOINTMENT (OUTPATIENT)
Dept: GENERAL RADIOLOGY | Age: 56
DRG: 871 | End: 2021-09-24
Attending: FAMILY MEDICINE
Payer: COMMERCIAL

## 2021-09-24 LAB
ALBUMIN SERPL-MCNC: 2.1 G/DL (ref 3.5–5)
ALBUMIN/GLOB SERPL: 0.5 {RATIO} (ref 1.2–3.5)
ALP SERPL-CCNC: 185 U/L (ref 50–136)
ALT SERPL-CCNC: 57 U/L (ref 12–65)
ANION GAP SERPL CALC-SCNC: 9 MMOL/L (ref 7–16)
AST SERPL-CCNC: 21 U/L (ref 15–37)
BILIRUB SERPL-MCNC: 0.5 MG/DL (ref 0.2–1.1)
BUN SERPL-MCNC: 21 MG/DL (ref 6–23)
CALCIUM SERPL-MCNC: 8.4 MG/DL (ref 8.3–10.4)
CHLORIDE SERPL-SCNC: 104 MMOL/L (ref 98–107)
CO2 SERPL-SCNC: 23 MMOL/L (ref 21–32)
CREAT SERPL-MCNC: 0.89 MG/DL (ref 0.8–1.5)
ERYTHROCYTE [DISTWIDTH] IN BLOOD BY AUTOMATED COUNT: 12.3 % (ref 11.9–14.6)
GLOBULIN SER CALC-MCNC: 4.3 G/DL (ref 2.3–3.5)
GLUCOSE SERPL-MCNC: 112 MG/DL (ref 65–100)
HCT VFR BLD AUTO: 39.7 % (ref 41.1–50.3)
HGB BLD-MCNC: 13.9 G/DL (ref 13.6–17.2)
MCH RBC QN AUTO: 30.1 PG (ref 26.1–32.9)
MCHC RBC AUTO-ENTMCNC: 35 G/DL (ref 31.4–35)
MCV RBC AUTO: 85.9 FL (ref 79.6–97.8)
NRBC # BLD: 0 K/UL (ref 0–0.2)
PLATELET # BLD AUTO: 480 K/UL (ref 150–450)
PMV BLD AUTO: 9.7 FL (ref 9.4–12.3)
POTASSIUM SERPL-SCNC: 4.2 MMOL/L (ref 3.5–5.1)
PROT SERPL-MCNC: 6.4 G/DL (ref 6.3–8.2)
RBC # BLD AUTO: 4.62 M/UL (ref 4.23–5.6)
SODIUM SERPL-SCNC: 136 MMOL/L (ref 136–145)
WBC # BLD AUTO: 10.8 K/UL (ref 4.3–11.1)

## 2021-09-24 PROCEDURE — 74011250637 HC RX REV CODE- 250/637: Performed by: FAMILY MEDICINE

## 2021-09-24 PROCEDURE — 97530 THERAPEUTIC ACTIVITIES: CPT

## 2021-09-24 PROCEDURE — 94640 AIRWAY INHALATION TREATMENT: CPT

## 2021-09-24 PROCEDURE — 65270000029 HC RM PRIVATE

## 2021-09-24 PROCEDURE — 77010033711 HC HIGH FLOW OXYGEN

## 2021-09-24 PROCEDURE — 74011250637 HC RX REV CODE- 250/637: Performed by: PHYSICIAN ASSISTANT

## 2021-09-24 PROCEDURE — 80053 COMPREHEN METABOLIC PANEL: CPT

## 2021-09-24 PROCEDURE — 97161 PT EVAL LOW COMPLEX 20 MIN: CPT

## 2021-09-24 PROCEDURE — 94762 N-INVAS EAR/PLS OXIMTRY CONT: CPT

## 2021-09-24 PROCEDURE — 74011250637 HC RX REV CODE- 250/637: Performed by: HOSPITALIST

## 2021-09-24 PROCEDURE — 71045 X-RAY EXAM CHEST 1 VIEW: CPT

## 2021-09-24 PROCEDURE — 97535 SELF CARE MNGMENT TRAINING: CPT

## 2021-09-24 PROCEDURE — 97165 OT EVAL LOW COMPLEX 30 MIN: CPT

## 2021-09-24 PROCEDURE — 74011250636 HC RX REV CODE- 250/636: Performed by: INTERNAL MEDICINE

## 2021-09-24 PROCEDURE — 36415 COLL VENOUS BLD VENIPUNCTURE: CPT

## 2021-09-24 PROCEDURE — 85027 COMPLETE CBC AUTOMATED: CPT

## 2021-09-24 RX ADMIN — BARICITINIB 4 MG: 2 TABLET, FILM COATED ORAL at 08:44

## 2021-09-24 RX ADMIN — TRAZODONE HYDROCHLORIDE 200 MG: 50 TABLET ORAL at 21:32

## 2021-09-24 RX ADMIN — GUAIFENESIN 1200 MG: 600 TABLET ORAL at 21:31

## 2021-09-24 RX ADMIN — OXYCODONE HYDROCHLORIDE AND ACETAMINOPHEN 2000 MG: 500 TABLET ORAL at 08:46

## 2021-09-24 RX ADMIN — PANTOPRAZOLE SODIUM 40 MG: 40 TABLET, DELAYED RELEASE ORAL at 05:22

## 2021-09-24 RX ADMIN — CETIRIZINE HYDROCHLORIDE 10 MG: 10 TABLET, FILM COATED ORAL at 08:46

## 2021-09-24 RX ADMIN — BUDESONIDE AND FORMOTEROL FUMARATE DIHYDRATE 2 PUFF: 160; 4.5 AEROSOL RESPIRATORY (INHALATION) at 08:31

## 2021-09-24 RX ADMIN — BUDESONIDE AND FORMOTEROL FUMARATE DIHYDRATE 2 PUFF: 160; 4.5 AEROSOL RESPIRATORY (INHALATION) at 20:41

## 2021-09-24 RX ADMIN — DEXAMETHASONE SODIUM PHOSPHATE 6 MG: 10 INJECTION, SOLUTION INTRAMUSCULAR; INTRAVENOUS at 08:46

## 2021-09-24 RX ADMIN — Medication 10 ML: at 13:15

## 2021-09-24 RX ADMIN — Medication 10 ML: at 05:22

## 2021-09-24 RX ADMIN — Medication 10 ML: at 21:32

## 2021-09-24 RX ADMIN — ZINC SULFATE 220 MG (50 MG) CAPSULE 1 CAPSULE: CAPSULE at 08:44

## 2021-09-24 RX ADMIN — FLUTICASONE PROPIONATE 2 SPRAY: 50 SPRAY, METERED NASAL at 17:00

## 2021-09-24 RX ADMIN — FLUTICASONE PROPIONATE 2 SPRAY: 50 SPRAY, METERED NASAL at 09:00

## 2021-09-24 RX ADMIN — ENOXAPARIN SODIUM 40 MG: 40 INJECTION SUBCUTANEOUS at 08:45

## 2021-09-24 RX ADMIN — VITAMIN D, TAB 1000IU (100/BT) 4000 UNITS: 25 TAB at 08:45

## 2021-09-24 RX ADMIN — GUAIFENESIN 1200 MG: 600 TABLET ORAL at 08:46

## 2021-09-24 NOTE — PROGRESS NOTES
OT recommending HH. PT states HH vs. No additional therapy. CM spoke with patient who is agreeable to [facility] and agreeable to referral being sent to [facility]. Order placed, referral sent. [facility] [place]CP. Hospitalist Dr. Braxton Gallegos agreed to follow patient until patient established with PCP. CM will remain available.

## 2021-09-24 NOTE — PROGRESS NOTES
Physician Progress Note      PATIENTSaintclair Guitar  [facility]:                  [id number]  :                       1965  ADMIT DATE:       2021 5:52 AM  DISCH DATE:  RESPONDING  PROVIDER #:        HERB HUNTLEY DO          QUERY TEXT:    Pt admitted with COVID-19 and noted to have elevated WBC and RR. If possible, please document in progress notes and discharge summary if you are evaluating and/or treating: The medical record reflects the following:  Risk Factors: [id number] documented in ER  Clinical Indicators: COVID 19/PNA, WBC from 11.23 to 13.0 to 14.7() RR 29-32  Treatment: Dexamethasone IV  Options provided:  -- Sepsis present on admission due to COVID-19 infection  -- Sepsis not present on admission due to COVID-19 infection  -- Sepsis present on admission due to COVID-19 pneumonia  -- Sepsis not present on admission due to COVID-19 pneumonia  -- Covid-19 infection without sepsis  -- Covid-19 pneumonia without sepsis  -- Other - I will add my own diagnosis  -- Disagree - Not applicable / Not valid  -- Disagree - Clinically unable to determine / Unknown  -- Refer to Clinical Documentation Reviewer    PROVIDER RESPONSE TEXT:    This patient has sepsis which was present on admission due to COVID-19 pneumonia.     Query created by: Sridevi Rosen on 2021 2:23 PM      Electronically signed by:  Slim Macias DO 2021 8:32 AM

## 2021-09-24 NOTE — PROGRESS NOTES
ACUTE OT GOALS:  (Developed with and agreed upon by patient and/or caregiver.)  1) Patient will complete lower body bathing and dressing with MOD I and adaptive equipment as needed. 2) Patient will complete toileting with MOD I.   3) Patient will complete functional transfers INDEPENDENTLY. 4) Patient will tolerate at least 25 minutes of OT activity with AS NEEDED rest breaks while maintaining O2 sats >90%. 5) Patient will verbalize at least 3 energy conservation technique to utilize during ADL/IADL. Timeframe: 7 visits     OCCUPATIONAL THERAPY ASSESSMENT: Initial Assessment and Daily Note OT Treatment Day # 1    Catherine Arora is a 64 y.o. male   PRIMARY DIAGNOSIS: Acute hypoxemic respiratory failure due to COVID-19 ([facility]  Acute hypoxemic respiratory failure due to COVID-19 ([facility] [U07.1, J96.01]       Reason for Referral:    ICD-10: Treatment Diagnosis: Generalized Muscle Weakness (M62.81)  INPATIENT: Payor: [facility] / Plan: [employer] HMO/CHOICE PLUS/POS / Product Type: HMO /   ASSESSMENT:     REHAB RECOMMENDATIONS:   Recommendation to date pending progress:  [address] [place] [address] Therapy  Equipment:    Shower Chair     PRIOR LEVEL OF FUNCTION:  (Prior to Hospitalization)  INITIAL/CURRENT LEVEL OF FUNCTION:  (Based on today's evaluation)   Bathing:   Independent  Dressing:   Independent  Feeding/Grooming:   Independent  Toileting:   Independent  Functional Mobility:   Independent Bathing:   Minimal Assistance  Dressing:   Contact Guard Assistance  Feeding/Grooming:   Set Up in chair  Toileting:   Minimal Assistance  Functional Mobility:   Contact Guard Assistance     ASSESSMENT:  Mr. Philly Pace is a 63 y/o male presents with acute respiratory failure due to COVID 19. Today pt presents with decreased activity tolerance, balance and strength impacting ADLs.  Pt is currently on 45L 75% Airvo and sats 94% at rest. Pt completed functional transfers, LE dressing and grooming ADLs in grid below. Pt noted to have decreased insight into O2 needs and current deficits, but educated pt on importance of energy conservation techniques, taking RB after activity, and beginning pursed lip breathing when O2 sats decrease. Pt verbalized understanding. Pt O2 sats ranged 86-94% throughout. Pt is currently functioning below baseline and would benefit from skilled OT services to address OT goals and plan of care. SUBJECTIVE:   Mr. Bhupinder Angulo states, \"I normally work the night shift so I'm tired. \"    SOCIAL HISTORY/LIVING ENVIRONMENT: lives with wife, one level home, no DME in home, tub shower, works as a   Home Environment: Private residence  # Steps to Enter: 3  One/Two Story Residence: One story  Living Alone: No  Support Systems: Spouse/Significant Other    OBJECTIVE:     PAIN: VITAL SIGNS: LINES/DRAINS:   Pre Treatment: Pain Screen  Pain Scale 1: Numeric (0 - 10)  Pain Intensity 1: 0  Post Treatment: 0 Vital Signs  O2 Sat (%): 94 %  O2 Device: Heated; Hi flow nasal cannula  O2 Flow Rate (L/min): 45 l/min  FIO2 (%): 75 % Continuous Pulse Oximetry  O2 Device: Heated, Hi flow nasal cannula     GROSS EVALUATION:  BUE Within Functional Limits Abnormal/ Functional Abnormal/ Non-Functional (see comments) Not Tested Comments:   AROM [x] [] [] []    PROM [] [] [] [x]    Strength [] [x] [] [] 4+/5 BUE, stated feeling weaker than normal   Balance [] [x] [] [] CGA no AD   Posture [x] [] [] []    Sensation [x] [] [] []    Coordination [x] [] [] []    Tone [] [] [] [x]    Edema [] [] [] [x]    Activity Tolerance [] [x] [] [] 45L 75% Airvo    [] [] [] []      COGNITION/  PERCEPTION: Intact Impaired   (see comments) Comments:   Orientation [x] []    Vision [] [x] Wears glasses   Hearing [x] []    Judgment/ Insight [] [x] Decreased insight into O2 needs and current deficits   Attention [x] []    Memory [x] []    Command Following [] [x] Needs multimodal cues at times   Emotional Regulation [x] []     [] [] ACTIVITIES OF DAILY LIVING: I Mod I S SBA CGA Min Mod Max Total NT Comments   BASIC ADLs:              Bathing/ Showering [] [] [] [] [] [] [] [] [] [x]    Toileting [] [] [] [] [] [] [] [] [] [x]    Dressing [] [] [] [] [x] [] [] [] [] [] Donning pull up underwear sitting/standing at chair   Feeding [] [] [] [] [] [] [] [] [] [x]    Grooming [] [] [x] [] [] [] [] [] [] [] Washing face and brushing teeth sitting in chair   Personal Device Care [] [] [] [] [] [] [] [] [] [x]    Functional Mobility [] [] [] [] [x] [] [] [] [] [] No AD   I=Independent, Mod I=Modified Independent, S=Supervision, SBA=Standby Assistance, CGA=Contact Guard Assistance,   Min=Minimal Assistance, Mod=Moderate Assistance, Max=Maximal Assistance, Total=Total Assistance, NT=Not Tested    MOBILITY: I Mod I S SBA CGA Min Mod Max Total  NT x2 Comments: pt received sitting in chair   Supine to sit [] [] [] [] [] [] [] [] [] [x] []    Sit to supine [] [] [] [x] [] [] [] [] [] [] []    Sit to stand [] [] [] [x] [] [] [] [] [] [] [] No AD   Bed to chair [] [] [] [] [x] [] [] [] [] [] [] No AD   I=Independent, Mod I=Modified Independent, S=Supervision, SBA=Standby Assistance, CGA=Contact Guard Assistance,   Min=Minimal Assistance, Mod=Moderate Assistance, Max=Maximal Assistance, Total=Total Assistance, NT=Not Tested    [employer] AM-[employer]AC[employer] 6 Clicks   Daily Activity Inpatient Short Form        How much help from another person does the patient currently need. .. Total A Lot A Little None   1. Putting on and taking off regular lower body clothing? [] 1   [] 2   [x] 3   [] 4   2. Bathing (including washing, rinsing, drying)? [] 1   [] 2   [x] 3   [] 4   3. Toileting, which includes using toilet, bedpan or urinal?   [] 1   [] 2   [x] 3   [] 4   4. Putting on and taking off regular upper body clothing? [] 1   [] 2   [] 3   [x] 4   5. Taking care of personal grooming such as brushing teeth? [] 1   [] 2   [] 3   [x] 4   6. Eating meals? [] 1   [] 2   [] 3   [x] 4   © 2007, Trustees of [facility] [employer], under license to [employer]. All rights reserved     Score:  Initial: 21 Most Recent: X (Date: -- )   Interpretation of Tool:  Represents activities that are increasingly more difficult (i.e. Bed mobility, Transfers, Gait). PLAN:   FREQUENCY/DURATION: OT Plan of Care: 3 times/week for duration of hospital stay or until stated goals are met, whichever comes first.    PROBLEM LIST:   (Skilled intervention is medically necessary to address:)  1. Decreased ADL/Functional Activities  2. Decreased Activity Tolerance  3. Decreased Balance  4. Decreased Strength   INTERVENTIONS PLANNED:   (Benefits and precautions of occupational therapy have been discussed with the patient.)  1. Self Care Training  2. Therapeutic Activity  3. Therapeutic Exercise/HEP  4. Neuromuscular Re-education  5. Education     TREATMENT:     EVALUATION: Low Complexity : (Untimed Charge)    TREATMENT:   ($$ Self Care/Home Management: 8-22 mins    )  Self Care (12 Minutes): Self care including Lower Body Dressing, Grooming, Energy Conservation Training and functional transfers in prep for ADLs to increase independence and decrease level of assistance required.     TREATMENT GRID:  N/A    AFTER TREATMENT POSITION/PRECAUTIONS:  Bed, Needs within reach and RN notified    INTERDISCIPLINARY COLLABORATION:  RN/PCT and OT/HINSON    TOTAL TREATMENT DURATION:  OT Patient Time In/Time Out  Time In: 8721  Time Out: Grayson Weir, OT

## 2021-09-24 NOTE — PROGRESS NOTES
Occupational Therapy Note:    OT orders received, chart reviewed, and evaluation attempted. Patient soundly sleeping and when attempting to wake him, pt very drowsy and unable to keep his eyes open. Will plan to check back as schedule permits and patient is available to complete occupational therapy evaluation.      Thank you,    Sagar Giang OTR/L

## 2021-09-24 NOTE — PROGRESS NOTES
Hourly rounds completed on patient, call light with in reach and bed low and locked. Pt remains on Airvo 60L/100% with o2 sats above 90%. NAD noted during shift. Patient denies needs at this time. Will report to oncoming nurse.

## 2021-09-24 NOTE — PROGRESS NOTES
Hospitalist Progress Note   Admit Date:  2021  5:52 AM   Name:  Diego Yuan   Age:  64 y.o. Sex:  male  :  1965   MRN:  [id number]   Room:  [facility]    Presenting Complaint: Shortness of Breath    Reason(s) for Admission: Acute hypoxemic respiratory failure due to COVID-19 ([facility].) [U07.1, J96.01]     Hospital Course & Interval History:   Antonia Quintero a 64 y. o. male with no significant past medical history who presented to ED with worsening shortness of breath and fatigue. Reports symptoms started around  and was tested positive for COVID19 on .  His symptoms were mild initially and therefore he did not seek any medical evaluation. Fermin Krause was seen on  in the [facility] ED with worsening lethargy, shortness of breath.  He was saturating well on room air and therefore was discharged home with Decadron.  He reports that he took all but 2 pills of Decadron since then but his symptoms have worsened. Fermin Krause has been checking his oxygen saturation at home and has been consistently staying in mid 80s past couple days. Ang Breed reporting poor appetite, loss of taste and smell, nausea, dry cough, fever and chills.       In the ED, patient was found to be 84% on room air.  Saturation improved with O2 supplement.  Currently on 15 L high flow nasal cannula plus nonrebreather to maintain saturation of 94%.  Laboratory work up is remarkable for WBC 11.2, BUN 16, creatinine 1.03,  proCalcitonin of 0.14, CRP of 6.6 x-ray with diffuse bilateral lung infiltrates.  EKG showed sinus rhythm with nonspecific T and ST wave abnormalities.        SARS-CoV-2 PCR ordered 2021 resulted 2020 POSITIVE     Went to bathroom to clean up without assistance and found with spo2 77%. Placed on 15L HFNC bc he was insistent on taking a shower and when placed back on optiflow 45/60% (previous settings), he was satting 80% and fio2 increased to 100%     Subjective (21):  No acute events ON.  Remains on 100% repeat cxr Interval worsening of diffuse bilateral multifocal lung infiltrates  Review of Systems   Constitutional: Positive for malaise/fatigue. Respiratory: Positive for cough and shortness of breath. Psychiatric/Behavioral: The patient has insomnia. Assessment & Plan:     Principal Problem:    Acute hypoxemic respiratory failure due to COVID-19 // Sepsis 2/2 COVID PNA    COVID+9/6 (PCR+9/18)   Worse. Wean FIO2 as tolerated. - CT chest 9/19/2021 NEG for acute PE  - cont baricitinib   - cont decadron  - awake proning   - cont symbicort, mucinex, ocean spray, flonase, vitamins  - procal nil and d dimer not elevated   - discussed awake proning.   - Cont antihistamine for congestion.   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 trazodone nightly     GERD - PPI     Transaminitis - 2/2 covid.  Cont to monitor     Thrombocytosis - reactive to infection       Dispo/Discharge Planning:      Home when stable     Diet:  ADULT DIET Regular  DVT PPx: lovenox   Code status: Full Code    Hospital Problems as of 9/24/2021 Date Reviewed: 12/23/2020        Codes Class Noted - Resolved POA    Transaminitis ICD-10-CM: R74.01  ICD-9-CM: 790.4  9/23/2021 - Present Yes        Hypoalbuminemia ICD-10-CM: E88.09  ICD-9-CM: 273.8  9/23/2021 - Present Clinically Undetermined        Thrombocytosis ICD-10-CM: D47.3  ICD-9-CM: 238.71  9/23/2021 - Present Yes        Pneumonia due to COVID-19 virus ICD-10-CM: U07.1, J12.82  ICD-9-CM: 480.8, 079.89  9/23/2021 - Present Yes        Sleep disorder due to a general medical condition, insomnia type ICD-10-CM: G47.01  ICD-9-CM: 327.01  9/22/2021 - Present Yes        * (Principal) Acute hypoxemic respiratory failure due to COVID-19 [facility]) ICD-10-CM: U07.1, J96.01  ICD-9-CM: 518.81, 079.89, 799.02  9/18/2021 - Present Yes              Objective:     Patient Vitals for the past 24 hrs:   Temp Pulse Resp BP SpO2   09/24/21 0706 97.6 °F (36.4 °C) 85 26 (!) 101/57 95 %   09/24/21 0436 98 °F (36.7 °C) 69 29 106/76 96 %   09/23/21 2357 98.2 °F (36.8 °C) 73 26 109/79 95 %   09/23/21 2100     97 %   09/23/21 1912 98.5 °F (36.9 °C) 85 28 104/67 98 %   09/23/21 1230 98.1 °F (36.7 °C) 76 18 120/72 95 %     Oxygen Therapy  O2 Sat (%): 95 % (09/24/21 0706)  Pulse via Oximetry: 81 beats per minute (09/23/21 2100)  O2 Device: Heated; Hi flow nasal cannula (09/23/21 2100)  Skin Assessment: Clean, dry, & intact (09/18/21 0556)  O2 Flow Rate (L/min): 60 l/min (09/23/21 2100)  O2 Temperature: 87.8 °F (31 °C) (09/23/21 2100)  FIO2 (%): 100 % (09/23/21 2100)    Estimated body mass index is 22.6 kg/m² as calculated from the following:    Height as of this encounter: 6' 1\" (1.854 m). Weight as of this encounter: 77.7 kg (171 lb 4.8 oz). Intake/Output Summary (Last 24 hours) at 9/24/2021 0832  Last data filed at 9/24/2021 0437  Gross per 24 hour   Intake 340 ml   Output 1450 ml   Net -1110 ml         Physical Exam:   Physical Exam  Vitals and nursing note reviewed. Constitutional:       Appearance: Normal appearance. Cardiovascular:      Rate and Rhythm: Normal rate and regular rhythm. Pulmonary:      Effort: Pulmonary effort is normal. No respiratory distress. Breath sounds: Normal breath sounds. Neurological:      General: No focal deficit present. Mental Status: He is alert.        I have reviewed ordered lab tests and independently visualized imaging below:    Last 24hr Labs:  Recent Results (from the past 24 hour(s))   CBC W/O DIFF    Collection Time: 09/24/21  6:32 AM   Result Value Ref Range    WBC 10.8 4.3 - 11.1 K/uL    RBC 4.62 4.23 - 5.6 M/uL    HGB 13.9 13.6 - 17.2 g/dL    HCT 39.7 (L) 41.1 - 50.3 %    MCV 85.9 79.6 - 97.8 FL    MCH 30.1 26.1 - 32.9 PG    MCHC 35.0 31.4 - 35.0 g/dL    RDW 12.3 11.9 - 14.6 %    PLATELET 916 (H) 542 - 450 K/uL    MPV 9.7 9.4 - 12.3 FL    ABSOLUTE NRBC 0.00 0.0 - 0.2 K/uL   METABOLIC PANEL, COMPREHENSIVE Collection Time: 09/24/21  6:32 AM   Result Value Ref Range    Sodium 136 136 - 145 mmol/L    Potassium 4.2 3.5 - 5.1 mmol/L    Chloride 104 98 - 107 mmol/L    CO2 23 21 - 32 mmol/L    Anion gap 9 7 - 16 mmol/L    Glucose 112 (H) 65 - 100 mg/dL    BUN 21 6 - 23 MG/DL    Creatinine 0.89 0.8 - 1.5 MG/DL    GFR est AA >60 >60 ml/min/1.73m2    GFR est non-AA >60 >60 ml/min/1.73m2    Calcium 8.4 8.3 - 10.4 MG/DL    Bilirubin, total 0.5 0.2 - 1.1 MG/DL    ALT (SGPT) 57 12 - 65 U/L    AST (SGOT) 21 15 - 37 U/L    Alk. phosphatase 185 (H) 50 - 136 U/L    Protein, total 6.4 6.3 - 8.2 g/dL    Albumin 2.1 (L) 3.5 - 5.0 g/dL    Globulin 4.3 (H) 2.3 - 3.5 g/dL    A-G Ratio 0.5 (L) 1.2 - 3.5         All Micro Results     Procedure Component Value Units Date/Time    CULTURE, BLOOD [[id number]] Collected: 09/18/21 0638    Order Status: Completed Specimen: Blood Updated: 09/23/21 0711     Special Requests: --        LEFT  Antecubital       Culture result: NO GROWTH 5 DAYS       CULTURE, BLOOD [[id number]] Collected: 09/18/21 0649    Order Status: Completed Specimen: Blood Updated: 09/23/21 0711     Special Requests: --        RIGHT  Antecubital       Culture result: NO GROWTH 5 DAYS             Other Studies:  No results found.     Current Meds:  Current Facility-Administered Medications   Medication Dose Route Frequency    traZODone (DESYREL) tablet 200 mg  200 mg Oral QHS    cetirizine (ZYRTEC) tablet 10 mg  10 mg Oral DAILY    guaiFENesin ER (MUCINEX) tablet 1,200 mg  1,200 mg Oral Q12H    budesonide-formoteroL (SYMBICORT) 160-4.5 mcg/actuation HFA inhaler 2 Puff  2 Puff Inhalation BID RT    pantoprazole (PROTONIX) tablet 40 mg  40 mg Oral ACB    baricitinib (OLUMIANT) tablet 4 mg  4 mg Oral DAILY    fluticasone propionate (FLONASE) 50 mcg/actuation nasal spray 2 Spray  2 Spray Both Nostrils BID    sodium chloride (OCEAN) 0.65 % nasal squeeze bottle 2 Spray  2 Spray Both Nostrils Q2H PRN    ascorbic acid (vitamin C) (VITAMIN C) tablet 2,000 mg  2,000 mg Oral DAILY    cholecalciferol (VITAMIN D3) (1000 Units /25 mcg) tablet 4,000 Units  4,000 Units Oral DAILY    zinc sulfate (ZINCATE) 50 mg zinc (220 mg) capsule 1 Capsule  1 Capsule Oral DAILY    guaiFENesin-codeine (ROBITUSSIN AC) 100-10 mg/5 mL solution 10 mL  10 mL Oral Q4H PRN    sodium chloride (NS) flush 5-40 mL  5-40 mL IntraVENous Q8H    sodium chloride (NS) flush 5-40 mL  5-40 mL IntraVENous PRN    acetaminophen (TYLENOL) tablet 650 mg  650 mg Oral Q6H PRN    Or    acetaminophen (TYLENOL) suppository 650 mg  650 mg Rectal Q6H PRN    polyethylene glycol (MIRALAX) packet 17 g  17 g Oral DAILY PRN    promethazine (PHENERGAN) tablet 12.5 mg  12.5 mg Oral Q6H PRN    Or    ondansetron (ZOFRAN) injection 4 mg  4 mg IntraVENous Q6H PRN    enoxaparin (LOVENOX) injection 40 mg  40 mg SubCUTAneous DAILY    dexamethasone (DECADRON) 10 mg/mL injection 6 mg  6 mg IntraVENous Q24H       Signed:  Tessie Melo DO    Part of this note may have been written by using a voice dictation software. The note has been proof read but may still contain some grammatical/other typographical errors.

## 2021-09-24 NOTE — PROGRESS NOTES
ACUTE PHYSICAL THERAPY GOALS:  (Developed with and agreed upon by patient and/or caregiver.)  Goals:  (1.)Mr. Liu will move from supine to sit and sit to supine , scoot up and down and roll side to side in bed with INDEPENDENT within 5 treatment day(s) with functional O2 sats. (2.)Mr. Liu will transfer from bed to chair and chair to bed with INDEPENDENT using the least restrictive device within 5 treatment day(s) with functional O2 sats  (3.)Mr. Liu will ambulate with INDEPENDENT for >150 feet with the least restrictive device within 5 treatment day(s) with functional O2 sats. PHYSICAL THERAPY ASSESSMENT: Initial Assessment, Daily Note and PM PT Treatment Day # 1      Roldan Larkin is a 64 y.o. male   PRIMARY DIAGNOSIS: Acute hypoxemic respiratory failure due to COVID-19 [facility]  [facility]ute hypox[facility] [U07.1, J96.01]       Reason for Referral:    ICD-10: Treatment Diagnosis: Generalized Muscle Weakness (M62.81)  Difficulty in walking, Not elsewhere classified (R26.2)  INPATIENT: Payor: [facility] / Plan: [facility] HMO/CHOICE PLUS/POS / Product Type: HMO /     ASSESSMENT:     REHAB RECOMMENDATIONS:   Recommendation to date pending progress:  [address] [place] [address] no additional therpy  Equipment:    To Be Determined     PRIOR LEVEL OF FUNCTION:  (Prior to Hospitalization) INITIAL/CURRENT LEVEL OF FUNCTION:  (Most Recently Demonstrated)   Bed Mobility:   Independent  Sit to Stand:   Independent  Transfers:   Independent  Gait/Mobility:   Independent Bed Mobility:   Modified Independent  Sit to Stand:   Supervision  Transfers:   Standby Assistance  Gait/Mobility:   Standby Assistance     ASSESSMENT:  Mr. Kierra Solano is a 64year old WM with an admitting diagnosis of Acute hypoxemic respiratory failure due to COVID-19. He presents today sitting up in the bed agreeable to have therapy. He is on Airvo 60L 100% with resting sats at 94%. He states he is not feeling too great today as he is tired and frustrated with his situation. He moves himself well with his bed mobility and BLE AROM. He sat EOB with his sats remaining at 94%. He was quick to stand with SBA and wanted to transition to the bedside chair. He took 10 steps with SBA without an assistive device. His sats only dropped to 89% during gait. He sat in the chair and positioned himself for comfort. He had good questions related to how COVID attacks and asked if having the vaccine would have been helpful? He was encouraged to be patient with his recovery as his lungs try and fight and heal from the impact of the virus. He was kind today but obviously frustrated about having the virus impact him so hard.   Mr. Philly Pace would benefit from continued skilled PT to maximize his functional abilities while in the hospital.      SUBJECTIVE:   Mr. Philly Pace states, \"So if I had my vaccine, would I be this bad off?\"    SOCIAL HISTORY/LIVING ENVIRONMENT:   Home Environment: Private residence  # Steps to Enter: 3  One/Two Story Residence: One story  Living Alone: No  Support Systems: Spouse/Significant Other  OBJECTIVE:     PAIN: VITAL SIGNS: LINES/DRAINS:   Pre Treatment: Pain Screen  Pain Scale 1: Numeric (0 - 10)  Pain Intensity 1: 0  Post Treatment: 0/10     Visit Vitals  BP 93/63 (BP 1 Location: Right upper arm, BP Patient Position: At rest)   Pulse 71   Temp 97.8 °F (36.6 °C)   Resp 20   Ht 6' 1\" (1.854 m)   Wt 77.7 kg (171 lb 4.8 oz)   SpO2 97%   BMI 22.60 kg/m²      O2 Device: Heated, Hi flow nasal cannula  60L 100%  Airvo     GROSS EVALUATION:   Within Functional Limits Abnormal/ Functional Abnormal/ Non-Functional (see comments) Not Tested Comments:   AROM [x] [] [] []    PROM [x] [] [] []    Strength [x] [] [] []    Balance [x] [] [] []    Posture [x] [] [] []    Sensation [x] [] [] []    Coordination [x] [] [] []    Tone [x] [] [] []    Edema [x] [] [] []    Activity Tolerance [] [x] [] [] Limited with sats dropping with exertion    [] [] [] []      COGNITION/  PERCEPTION: Intact Impaired   (see comments) Comments:   Orientation [x] []    Vision [x] []    Hearing [x] []    Command Following [x] []    Safety Awareness [] [x] Very independent and wants to do things for himself    [] []      MOBILITY: I Mod I S SBA CGA Min Mod Max Total  NT x2 Comments:   Bed Mobility    Rolling [x] [] [] [] [] [] [] [] [] [] []    Supine to Sit [] [x] [] [] [] [] [] [] [] [] []    Scooting [] [x] [] [] [] [] [] [] [] [] []    Sit to Supine [] [x] [] [] [] [] [] [] [] [] []    Transfers    Sit to Stand [] [] [x] [] [] [] [] [] [] [] []    Bed to Chair [] [] [] [x] [] [] [] [] [] [] []    Stand to Sit [] [] [x] [] [] [] [] [] [] [] []    I=Independent, Mod I=Modified Independent, S=Supervision, SBA=Standby Assistance, CGA=Contact Guard Assistance,   Min=Minimal Assistance, Mod=Moderate Assistance, Max=Maximal Assistance, Total=Total Assistance, NT=Not Tested  GAIT: I Mod I S SBA CGA Min Mod Max Total  NT x2 Comments:   Level of Assistance [] [] [] [x] [] [] [] [] [] [] []    Distance 10' bed to chair    DME None    Gait Quality Quick to move but functional gait pattern    Weightbearing Status N/A     I=Independent, Mod I=Modified Independent, S=Supervision, SBA=Standby Assistance, CGA=Contact Guard Assistance,   Min=Minimal Assistance, Mod=Moderate Assistance, Max=Maximal Assistance, Total=Total Assistance, NT=Not Tested    [address] [facility] [address] [address] [facility] [address] [facility] Short Form       How much difficulty does the patient currently have. .. Unable A Lot A Little None   1. Turning over in bed (including adjusting bedclothes, sheets and blankets)? [] 1   [] 2   [] 3   [x] 4   2. Sitting down on and standing up from a chair with arms ( e.g., wheelchair, bedside commode, etc.)   [] 1   [] 2   [] 3   [x] 4   3. Moving from lying on back to sitting on the side of the bed?    [] 1   [] 2   [] 3   [x] 4   How much help from another person does the patient currently need. .. Total A Lot A Little None   4. Moving to and from a bed to a chair (including a wheelchair)? [] 1   [] 2   [x] 3   [] 4   5. Need to walk in hospital room? [] 1   [] 2   [x] 3   [] 4   6. Climbing 3-5 steps with a railing? [] 1   [] 2   [x] 3   [] 4   © 2007, Trustees of [address] [place] [address], under license to "[employer]. All rights reserved     Score:  Initial: 21 Most Recent: X (Date: -- )    Interpretation of Tool:  Represents activities that are increasingly more difficult (i.e. Bed mobility, Transfers, Gait). PLAN:   FREQUENCY/DURATION: PT Plan of Care: 3 times/week for duration of hospital stay or until stated goals are met, whichever comes first.    PROBLEM LIST:   (Skilled intervention is medically necessary to address:)  1. Decreased ADL/Functional Activities  2. Decreased Activity Tolerance  3. Decreased Gait Ability   INTERVENTIONS PLANNED:   (Benefits and precautions of physical therapy have been discussed with the patient.)  1. Therapeutic Activity  2. Therapeutic Exercise/HEP  3. Neuromuscular Re-education  4. Gait Training  5. Education     TREATMENT:     EVALUATION: Low Complexity : (Untimed Charge)    TREATMENT:   ($$ Therapeutic Activity: 8-22 mins    )  Therapeutic Activity (18 Minutes): Therapeutic activity included Rolling, Supine to Sit, Sit to Supine, Scooting, Ambulation on level ground, Sitting balance , Standing balance and sit to stand to improve functional Mobility, Strength and Activity tolerance.     AFTER TREATMENT POSITION/PRECAUTIONS:  Chair, Needs within reach and RN notified    INTERDISCIPLINARY COLLABORATION:  RN/PCT    TOTAL TREATMENT DURATION:  PT Patient Time In/Time Out  Time In: 1422  Time Out: [address] Maria Teresa Solomon

## 2021-09-25 LAB
ALBUMIN SERPL-MCNC: 2.2 G/DL (ref 3.5–5)
ALBUMIN/GLOB SERPL: 0.5 {RATIO} (ref 1.2–3.5)
ALP SERPL-CCNC: 177 U/L (ref 50–136)
ALT SERPL-CCNC: 51 U/L (ref 12–65)
ANION GAP SERPL CALC-SCNC: 8 MMOL/L (ref 7–16)
ARTERIAL PATENCY WRIST A: POSITIVE
AST SERPL-CCNC: 19 U/L (ref 15–37)
BASE DEFICIT BLD-SCNC: 0.3 MMOL/L
BDY SITE: ABNORMAL
BILIRUB SERPL-MCNC: 0.4 MG/DL (ref 0.2–1.1)
BUN SERPL-MCNC: 19 MG/DL (ref 6–23)
CALCIUM SERPL-MCNC: 8.8 MG/DL (ref 8.3–10.4)
CHLORIDE SERPL-SCNC: 103 MMOL/L (ref 98–107)
CO2 SERPL-SCNC: 25 MMOL/L (ref 21–32)
CREAT SERPL-MCNC: 0.89 MG/DL (ref 0.8–1.5)
ERYTHROCYTE [DISTWIDTH] IN BLOOD BY AUTOMATED COUNT: 12.2 % (ref 11.9–14.6)
GAS FLOW.O2 O2 DELIVERY SYS: ABNORMAL L/MIN
GLOBULIN SER CALC-MCNC: 4.2 G/DL (ref 2.3–3.5)
GLUCOSE SERPL-MCNC: 97 MG/DL (ref 65–100)
HCO3 BLD-SCNC: 24 MMOL/L (ref 22–26)
HCT VFR BLD AUTO: 40.5 % (ref 41.1–50.3)
HGB BLD-MCNC: 13.4 G/DL (ref 13.6–17.2)
MCH RBC QN AUTO: 29.3 PG (ref 26.1–32.9)
MCHC RBC AUTO-ENTMCNC: 33.1 G/DL (ref 31.4–35)
MCV RBC AUTO: 88.4 FL (ref 79.6–97.8)
NRBC # BLD: 0 K/UL (ref 0–0.2)
O2/TOTAL GAS SETTING VFR VENT: 75 %
PCO2 BLD: 37.5 MMHG (ref 35–45)
PH BLD: 7.42 [PH] (ref 7.35–7.45)
PLATELET # BLD AUTO: 449 K/UL (ref 150–450)
PMV BLD AUTO: 10 FL (ref 9.4–12.3)
PO2 BLD: 127 MMHG (ref 75–100)
POTASSIUM SERPL-SCNC: 4.3 MMOL/L (ref 3.5–5.1)
PROT SERPL-MCNC: 6.4 G/DL (ref 6.3–8.2)
RBC # BLD AUTO: 4.58 M/UL (ref 4.23–5.6)
SAO2 % BLD: 98.9 % (ref 95–98)
SERVICE CMNT-IMP: ABNORMAL
SODIUM SERPL-SCNC: 136 MMOL/L (ref 138–145)
SPECIMEN TYPE: ABNORMAL
WBC # BLD AUTO: 10.9 K/UL (ref 4.3–11.1)

## 2021-09-25 PROCEDURE — 74011250637 HC RX REV CODE- 250/637: Performed by: PHYSICIAN ASSISTANT

## 2021-09-25 PROCEDURE — 74011250637 HC RX REV CODE- 250/637: Performed by: HOSPITALIST

## 2021-09-25 PROCEDURE — 82803 BLOOD GASES ANY COMBINATION: CPT

## 2021-09-25 PROCEDURE — 36415 COLL VENOUS BLD VENIPUNCTURE: CPT

## 2021-09-25 PROCEDURE — 80053 COMPREHEN METABOLIC PANEL: CPT

## 2021-09-25 PROCEDURE — 65270000029 HC RM PRIVATE

## 2021-09-25 PROCEDURE — 36600 WITHDRAWAL OF ARTERIAL BLOOD: CPT

## 2021-09-25 PROCEDURE — 74011250636 HC RX REV CODE- 250/636: Performed by: INTERNAL MEDICINE

## 2021-09-25 PROCEDURE — 74011250637 HC RX REV CODE- 250/637: Performed by: FAMILY MEDICINE

## 2021-09-25 PROCEDURE — 85027 COMPLETE CBC AUTOMATED: CPT

## 2021-09-25 PROCEDURE — 77010033711 HC HIGH FLOW OXYGEN

## 2021-09-25 PROCEDURE — 94640 AIRWAY INHALATION TREATMENT: CPT

## 2021-09-25 PROCEDURE — 94762 N-INVAS EAR/PLS OXIMTRY CONT: CPT

## 2021-09-25 RX ORDER — DEXAMETHASONE 4 MG/1
6 TABLET ORAL EVERY 24 HOURS
Status: COMPLETED | OUTPATIENT
Start: 2021-09-26 | End: 2021-09-27

## 2021-09-25 RX ADMIN — BARICITINIB 4 MG: 2 TABLET, FILM COATED ORAL at 08:49

## 2021-09-25 RX ADMIN — CETIRIZINE HYDROCHLORIDE 10 MG: 10 TABLET, FILM COATED ORAL at 08:49

## 2021-09-25 RX ADMIN — ENOXAPARIN SODIUM 40 MG: 40 INJECTION SUBCUTANEOUS at 08:49

## 2021-09-25 RX ADMIN — Medication 10 ML: at 05:17

## 2021-09-25 RX ADMIN — GUAIFENESIN 1200 MG: 600 TABLET ORAL at 08:49

## 2021-09-25 RX ADMIN — TRAZODONE HYDROCHLORIDE 200 MG: 50 TABLET ORAL at 21:40

## 2021-09-25 RX ADMIN — ZINC SULFATE 220 MG (50 MG) CAPSULE 1 CAPSULE: CAPSULE at 08:49

## 2021-09-25 RX ADMIN — PANTOPRAZOLE SODIUM 40 MG: 40 TABLET, DELAYED RELEASE ORAL at 05:17

## 2021-09-25 RX ADMIN — BUDESONIDE AND FORMOTEROL FUMARATE DIHYDRATE 2 PUFF: 160; 4.5 AEROSOL RESPIRATORY (INHALATION) at 21:55

## 2021-09-25 RX ADMIN — FLUTICASONE PROPIONATE 2 SPRAY: 50 SPRAY, METERED NASAL at 16:27

## 2021-09-25 RX ADMIN — GUAIFENESIN 1200 MG: 600 TABLET ORAL at 21:40

## 2021-09-25 RX ADMIN — SALINE NASAL SPRAY 2 SPRAY: 1.5 SOLUTION NASAL at 22:00

## 2021-09-25 RX ADMIN — SALINE NASAL SPRAY 2 SPRAY: 1.5 SOLUTION NASAL at 17:11

## 2021-09-25 RX ADMIN — Medication 10 ML: at 21:41

## 2021-09-25 RX ADMIN — FLUTICASONE PROPIONATE 2 SPRAY: 50 SPRAY, METERED NASAL at 08:50

## 2021-09-25 RX ADMIN — DEXAMETHASONE SODIUM PHOSPHATE 6 MG: 10 INJECTION, SOLUTION INTRAMUSCULAR; INTRAVENOUS at 08:49

## 2021-09-25 RX ADMIN — Medication 10 ML: at 14:54

## 2021-09-25 RX ADMIN — BUDESONIDE AND FORMOTEROL FUMARATE DIHYDRATE 2 PUFF: 160; 4.5 AEROSOL RESPIRATORY (INHALATION) at 07:45

## 2021-09-25 RX ADMIN — VITAMIN D, TAB 1000IU (100/BT) 4000 UNITS: 25 TAB at 08:49

## 2021-09-25 RX ADMIN — OXYCODONE HYDROCHLORIDE AND ACETAMINOPHEN 2000 MG: 500 TABLET ORAL at 08:49

## 2021-09-25 NOTE — PROGRESS NOTES
Assumed [phone]. A/o, vss, pt rested throughout night no complications. Safety precautions in place. Bed locked and low. Phone , tv remote, and call bell are within reach. Hourly rounding was completed.

## 2021-09-25 NOTE — PROGRESS NOTES
Hospitalist Progress Note   Admit Date:  2021  5:52 AM   Name:  Mindy Hernandez   Age:  64 y.o. Sex:  male  :  1965   MRN:  [id number]   Room:  [facility]    Presenting Complaint: Shortness of Breath    Reason(s) for Admission: Acute hypoxemic respiratory failure due to COVID-19 ([facility] [U07.1, J96.01]     Hospital Course & Interval History:   Marcella Alexis a 64 y. o. male with no significant past medical history who presented to ED with worsening shortness of breath and fatigue. Reports symptoms started around  and was tested positive for COVID19 on .  His symptoms were mild initially and therefore he did not seek any medical evaluation. Huang Maria was seen on  in the [facility] ED with worsening lethargy, shortness of breath.  He was saturating well on room air and therefore was discharged home with Decadron.  He reports that he took all but 2 pills of Decadron since then but his symptoms have worsened. Huang Maria has been checking his oxygen saturation at home and has been consistently staying in mid 80s past couple days. Sarah Moffett reporting poor appetite, loss of taste and smell, nausea, dry cough, fever and chills.       In the ED, patient was found to be 84% on room air.  Saturation improved with O2 supplement.  Currently on 15 L high flow nasal cannula plus nonrebreather to maintain saturation of 94%.  Laboratory work up is remarkable for WBC 11.2, BUN 16, creatinine 1.03,  proCalcitonin of 0.14, CRP of 6.6 x-ray with diffuse bilateral lung infiltrates.  EKG showed sinus rhythm with nonspecific T and ST wave abnormalities.        SARS-CoV-2 PCR ordered 2021 resulted 2020 POSITIVE     Went to bathroom to clean up without assistance and found with spo2 77%. Placed on 15L HFNC bc he was insistent on taking a shower and when placed back on optiflow 45/60% (previous settings), he was satting 80% and fio2 increased to 100%     Subjective (21):  No acute events ON.  Sleeping better with medication changes. ABG this AM looks good with po2 127. Review of Systems   Constitutional: Positive for malaise/fatigue. Respiratory: Positive for cough and shortness of breath. Psychiatric/Behavioral: The patient has insomnia. Assessment & Plan:     Principal Problem:    Acute hypoxemic respiratory failure due to COVID-19 // Sepsis 2/2 COVID PNA    COVID+9/6 (PCR+9/18)   Improving. airvo down to 50/65%; Wean FIO2 as tolerated. - CT chest 9/19/2021 NEG for acute PE  - cont baricitinib   - cont decadron  - awake proning   - cont symbicort, mucinex, ocean spray, flonase, vitamins  - procal nil and d dimer not elevated   - discussed awake proning.   - Cont antihistamine for congestion.   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 trazodone nightly     GERD - PPI     Transaminitis - 2/2 covid.  Cont to monitor     Thrombocytosis - reactive to infection       Dispo/Discharge Planning:      Home when stable     Diet:  ADULT DIET Regular  DVT PPx: lovenox   Code status: Full Code    Hospital Problems as of 9/25/2021 Date Reviewed: 12/23/2020        Codes Class Noted - Resolved POA    Transaminitis ICD-10-CM: R74.01  ICD-9-CM: 790.4  9/23/2021 - Present Yes        Hypoalbuminemia ICD-10-CM: E88.09  ICD-9-CM: 273.8  9/23/2021 - Present Clinically Undetermined        Thrombocytosis ICD-10-CM: D47.3  ICD-9-CM: 238.71  9/23/2021 - Present Yes        Pneumonia due to COVID-19 virus ICD-10-CM: U07.1, J12.82  ICD-9-CM: 480.8, 079.89  9/23/2021 - Present Yes        Sleep disorder due to a general medical condition, insomnia type ICD-10-CM: G47.01  ICD-9-CM: 327.01  9/22/2021 - Present Yes        * (Principal) Acute hypoxemic respiratory failure due to COVID-19 [facility]) ICD-10-CM: U07.1, J96.01  ICD-9-CM: 518.81, 079.89, 799.02  9/18/2021 - Present Yes              Objective:     Patient Vitals for the past 24 hrs:   Temp Pulse Resp BP SpO2   09/25/21 0745     94 %   09/25/21 0710 97.9 °F (36.6 °C) 71 18 (!) 93/56 97 %   09/25/21 0301 98.2 °F (36.8 °C) 65 21 108/74 97 %   09/24/21 2237 98.2 °F (36.8 °C) 77 20 94/65 97 %   09/24/21 2041     95 %   09/24/21 2035 97.8 °F (36.6 °C) 68 24 112/69 97 %   09/24/21 1544 97.6 °F (36.4 °C) 70 18 119/85 95 %   09/24/21 1505     94 %   09/24/21 1052 97.8 °F (36.6 °C) 71 20 93/63 97 %     Oxygen Therapy  O2 Sat (%): 94 % (09/25/21 0745)  Pulse via Oximetry: 56 beats per minute (09/25/21 0745)  O2 Device: Heated; Hi flow nasal cannula (09/25/21 0745)  Skin Assessment: Clean, dry, & intact (09/25/21 0745)  Skin Protection for O2 Device: No (09/25/21 0745)  O2 Flow Rate (L/min): 50 l/min (09/25/21 0745)  O2 Temperature: 87.8 °F (31 °C) (09/24/21 0831)  FIO2 (%): 63 % (09/25/21 0745)    Estimated body mass index is 22.6 kg/m² as calculated from the following:    Height as of this encounter: 6' 1\" (1.854 m). Weight as of this encounter: 77.7 kg (171 lb 4.8 oz). Intake/Output Summary (Last 24 hours) at 9/25/2021 1038  Last data filed at 9/25/2021 0301  Gross per 24 hour   Intake 240 ml   Output 1350 ml   Net -1110 ml         Physical Exam:   Physical Exam  Vitals and nursing note reviewed. Constitutional:       Appearance: Normal appearance. Cardiovascular:      Rate and Rhythm: Normal rate and regular rhythm. Pulmonary:      Effort: Pulmonary effort is normal. No respiratory distress. Breath sounds: Normal breath sounds. Neurological:      General: No focal deficit present. Mental Status: He is alert.        I have reviewed ordered lab tests and independently visualized imaging below:    Last 24hr Labs:  Recent Results (from the past 24 hour(s))   BLOOD GAS, ARTERIAL POC    Collection Time: 09/25/21  2:55 AM   Result Value Ref Range    Device: High Flow Nasal Cannula      FIO2 (POC) 75 %    pH (POC) 7.42 7.35 - 7.45      pCO2 (POC) 37.5 35 - 45 MMHG    pO2 (POC) 127 (H) 75 - 100 MMHG    HCO3 (POC) 24.0 22 - 26 MMOL/L    sO2 (POC) 98.9 (H) 95 - 98 %    Base deficit (POC) 0.3 mmol/L    Allens test (POC) Positive      Site LEFT RADIAL      Specimen type (POC) ARTERIAL      Performed by Gio    CBC W/O DIFF    Collection Time: 09/25/21  6:21 AM   Result Value Ref Range    WBC 10.9 4.3 - 11.1 K/uL    RBC 4.58 4.23 - 5.6 M/uL    HGB 13.4 (L) 13.6 - 17.2 g/dL    HCT 40.5 (L) 41.1 - 50.3 %    MCV 88.4 79.6 - 97.8 FL    MCH 29.3 26.1 - 32.9 PG    MCHC 33.1 31.4 - 35.0 g/dL    RDW 12.2 11.9 - 14.6 %    PLATELET 447 900 - 033 K/uL    MPV 10.0 9.4 - 12.3 FL    ABSOLUTE NRBC 0.00 0.0 - 0.2 K/uL   METABOLIC PANEL, COMPREHENSIVE    Collection Time: 09/25/21  6:21 AM   Result Value Ref Range    Sodium 136 (L) 138 - 145 mmol/L    Potassium 4.3 3.5 - 5.1 mmol/L    Chloride 103 98 - 107 mmol/L    CO2 25 21 - 32 mmol/L    Anion gap 8 7 - 16 mmol/L    Glucose 97 65 - 100 mg/dL    BUN 19 6 - 23 MG/DL    Creatinine 0.89 0.8 - 1.5 MG/DL    GFR est AA >60 >60 ml/min/1.73m2    GFR est non-AA >60 >60 ml/min/1.73m2    Calcium 8.8 8.3 - 10.4 MG/DL    Bilirubin, total 0.4 0.2 - 1.1 MG/DL    ALT (SGPT) 51 12 - 65 U/L    AST (SGOT) 19 15 - 37 U/L    Alk.  phosphatase 177 (H) 50 - 136 U/L    Protein, total 6.4 6.3 - 8.2 g/dL    Albumin 2.2 (L) 3.5 - 5.0 g/dL    Globulin 4.2 (H) 2.3 - 3.5 g/dL    A-G Ratio 0.5 (L) 1.2 - 3.5         All Micro Results     Procedure Component Value Units Date/Time    CULTURE, BLOOD [[id number]] Collected: 09/18/21 0638    Order Status: Completed Specimen: Blood Updated: 09/23/21 0711     Special Requests: --        LEFT  Antecubital       Culture result: NO GROWTH 5 DAYS       CULTURE, BLOOD [[id number]] Collected: 09/18/21 0649    Order Status: Completed Specimen: Blood Updated: 09/23/21 0711     Special Requests: --        RIGHT  Antecubital       Culture result: NO GROWTH 5 DAYS             Other Studies:  No results found.     Current Meds:  Current Facility-Administered Medications   Medication Dose Route Frequency    traZODone (DESYREL) tablet 200 mg  200 mg Oral QHS    cetirizine (ZYRTEC) tablet 10 mg  10 mg Oral DAILY    guaiFENesin ER (MUCINEX) tablet 1,200 mg  1,200 mg Oral Q12H    budesonide-formoteroL (SYMBICORT) 160-4.5 mcg/actuation HFA inhaler 2 Puff  2 Puff Inhalation BID RT    pantoprazole (PROTONIX) tablet 40 mg  40 mg Oral ACB    baricitinib (OLUMIANT) tablet 4 mg  4 mg Oral DAILY    fluticasone propionate (FLONASE) 50 mcg/actuation nasal spray 2 Spray  2 Spray Both Nostrils BID    sodium chloride (OCEAN) 0.65 % nasal squeeze bottle 2 Spray  2 Spray Both Nostrils Q2H PRN    ascorbic acid (vitamin C) (VITAMIN C) tablet 2,000 mg  2,000 mg Oral DAILY    cholecalciferol (VITAMIN D3) (1000 Units /25 mcg) tablet 4,000 Units  4,000 Units Oral DAILY    zinc sulfate (ZINCATE) 50 mg zinc (220 mg) capsule 1 Capsule  1 Capsule Oral DAILY    guaiFENesin-codeine (ROBITUSSIN AC) 100-10 mg/5 mL solution 10 mL  10 mL Oral Q4H PRN    sodium chloride (NS) flush 5-40 mL  5-40 mL IntraVENous Q8H    sodium chloride (NS) flush 5-40 mL  5-40 mL IntraVENous PRN    acetaminophen (TYLENOL) tablet 650 mg  650 mg Oral Q6H PRN    Or    acetaminophen (TYLENOL) suppository 650 mg  650 mg Rectal Q6H PRN    polyethylene glycol (MIRALAX) packet 17 g  17 g Oral DAILY PRN    promethazine (PHENERGAN) tablet 12.5 mg  12.5 mg Oral Q6H PRN    Or    ondansetron (ZOFRAN) injection 4 mg  4 mg IntraVENous Q6H PRN    enoxaparin (LOVENOX) injection 40 mg  40 mg SubCUTAneous DAILY    dexamethasone (DECADRON) 10 mg/mL injection 6 mg  6 mg IntraVENous Q24H Signed:  Alessia Diamond DO    Part of this note may have been written by using a voice dictation software. The note has been proof read but may still contain some grammatical/other typographical errors.

## 2021-09-25 NOTE — PROGRESS NOTES
Patient in bed resting. AxOx4. AirVo on 45L at [facility]. No complaints at this time. Will continue to monitor.

## 2021-09-26 ENCOUNTER — HOME HEALTH ADMISSION (OUTPATIENT)
Dept: HOME HEALTH SERVICES | Facility: HOME HEALTH | Age: 56
End: 2021-09-26
Payer: COMMERCIAL

## 2021-09-26 LAB
ALBUMIN SERPL-MCNC: 2.2 G/DL (ref 3.5–5)
ALBUMIN/GLOB SERPL: 0.6 {RATIO} (ref 1.2–3.5)
ALP SERPL-CCNC: 168 U/L (ref 50–136)
ALT SERPL-CCNC: 60 U/L (ref 12–65)
ANION GAP SERPL CALC-SCNC: 9 MMOL/L (ref 7–16)
AST SERPL-CCNC: 21 U/L (ref 15–37)
BILIRUB SERPL-MCNC: 0.4 MG/DL (ref 0.2–1.1)
BUN SERPL-MCNC: 19 MG/DL (ref 6–23)
CALCIUM SERPL-MCNC: 8.7 MG/DL (ref 8.3–10.4)
CHLORIDE SERPL-SCNC: 103 MMOL/L (ref 98–107)
CO2 SERPL-SCNC: 25 MMOL/L (ref 21–32)
CREAT SERPL-MCNC: 0.87 MG/DL (ref 0.8–1.5)
ERYTHROCYTE [DISTWIDTH] IN BLOOD BY AUTOMATED COUNT: 12.1 % (ref 11.9–14.6)
GLOBULIN SER CALC-MCNC: 3.9 G/DL (ref 2.3–3.5)
GLUCOSE SERPL-MCNC: 119 MG/DL (ref 65–100)
HCT VFR BLD AUTO: 38.6 % (ref 41.1–50.3)
HGB BLD-MCNC: 13.1 G/DL (ref 13.6–17.2)
MCH RBC QN AUTO: 30.5 PG (ref 26.1–32.9)
MCHC RBC AUTO-ENTMCNC: 33.9 G/DL (ref 31.4–35)
MCV RBC AUTO: 89.8 FL (ref 79.6–97.8)
NRBC # BLD: 0 K/UL (ref 0–0.2)
PLATELET # BLD AUTO: 433 K/UL (ref 150–450)
PMV BLD AUTO: 9.8 FL (ref 9.4–12.3)
POTASSIUM SERPL-SCNC: 3.8 MMOL/L (ref 3.5–5.1)
PROT SERPL-MCNC: 6.1 G/DL (ref 6.3–8.2)
RBC # BLD AUTO: 4.3 M/UL (ref 4.23–5.6)
SODIUM SERPL-SCNC: 137 MMOL/L (ref 138–145)
WBC # BLD AUTO: 10.1 K/UL (ref 4.3–11.1)

## 2021-09-26 PROCEDURE — 74011250636 HC RX REV CODE- 250/636: Performed by: FAMILY MEDICINE

## 2021-09-26 PROCEDURE — 65270000029 HC RM PRIVATE

## 2021-09-26 PROCEDURE — 74011250636 HC RX REV CODE- 250/636: Performed by: INTERNAL MEDICINE

## 2021-09-26 PROCEDURE — 94762 N-INVAS EAR/PLS OXIMTRY CONT: CPT

## 2021-09-26 PROCEDURE — 74011250637 HC RX REV CODE- 250/637: Performed by: PHYSICIAN ASSISTANT

## 2021-09-26 PROCEDURE — 74011250637 HC RX REV CODE- 250/637: Performed by: FAMILY MEDICINE

## 2021-09-26 PROCEDURE — 74011250637 HC RX REV CODE- 250/637: Performed by: HOSPITALIST

## 2021-09-26 PROCEDURE — 85027 COMPLETE CBC AUTOMATED: CPT

## 2021-09-26 PROCEDURE — 77010033711 HC HIGH FLOW OXYGEN

## 2021-09-26 PROCEDURE — 36415 COLL VENOUS BLD VENIPUNCTURE: CPT

## 2021-09-26 PROCEDURE — 94640 AIRWAY INHALATION TREATMENT: CPT

## 2021-09-26 PROCEDURE — 80053 COMPREHEN METABOLIC PANEL: CPT

## 2021-09-26 RX ORDER — MELATONIN
2000 DAILY
Status: DISCONTINUED | OUTPATIENT
Start: 2021-09-27 | End: 2021-10-02 | Stop reason: HOSPADM

## 2021-09-26 RX ORDER — ASCORBIC ACID 500 MG
1000 TABLET ORAL DAILY
Status: DISCONTINUED | OUTPATIENT
Start: 2021-09-27 | End: 2021-10-02 | Stop reason: HOSPADM

## 2021-09-26 RX ORDER — HYDROXYZINE PAMOATE 25 MG/1
25 CAPSULE ORAL 3 TIMES DAILY
Status: DISCONTINUED | OUTPATIENT
Start: 2021-09-26 | End: 2021-10-02 | Stop reason: HOSPADM

## 2021-09-26 RX ADMIN — SALINE NASAL SPRAY 2 SPRAY: 1.5 SOLUTION NASAL at 22:00

## 2021-09-26 RX ADMIN — ENOXAPARIN SODIUM 40 MG: 40 INJECTION SUBCUTANEOUS at 09:04

## 2021-09-26 RX ADMIN — CETIRIZINE HYDROCHLORIDE 10 MG: 10 TABLET, FILM COATED ORAL at 08:47

## 2021-09-26 RX ADMIN — BUDESONIDE AND FORMOTEROL FUMARATE DIHYDRATE 2 PUFF: 160; 4.5 AEROSOL RESPIRATORY (INHALATION) at 20:04

## 2021-09-26 RX ADMIN — PANTOPRAZOLE SODIUM 40 MG: 40 TABLET, DELAYED RELEASE ORAL at 05:52

## 2021-09-26 RX ADMIN — GUAIFENESIN 1200 MG: 600 TABLET ORAL at 08:47

## 2021-09-26 RX ADMIN — SALINE NASAL SPRAY 2 SPRAY: 1.5 SOLUTION NASAL at 16:00

## 2021-09-26 RX ADMIN — DEXAMETHASONE 6 MG: 4 TABLET ORAL at 08:47

## 2021-09-26 RX ADMIN — SALINE NASAL SPRAY 2 SPRAY: 1.5 SOLUTION NASAL at 09:00

## 2021-09-26 RX ADMIN — Medication 10 ML: at 06:00

## 2021-09-26 RX ADMIN — FLUTICASONE PROPIONATE 2 SPRAY: 50 SPRAY, METERED NASAL at 09:00

## 2021-09-26 RX ADMIN — FLUTICASONE PROPIONATE 2 SPRAY: 50 SPRAY, METERED NASAL at 17:00

## 2021-09-26 RX ADMIN — BARICITINIB 4 MG: 2 TABLET, FILM COATED ORAL at 08:47

## 2021-09-26 RX ADMIN — HYDROXYZINE PAMOATE 25 MG: 25 CAPSULE ORAL at 18:18

## 2021-09-26 RX ADMIN — ZINC SULFATE 220 MG (50 MG) CAPSULE 1 CAPSULE: CAPSULE at 08:47

## 2021-09-26 RX ADMIN — VITAMIN D, TAB 1000IU (100/BT) 4000 UNITS: 25 TAB at 09:04

## 2021-09-26 RX ADMIN — GUAIFENESIN 1200 MG: 600 TABLET ORAL at 21:41

## 2021-09-26 RX ADMIN — TRAZODONE HYDROCHLORIDE 200 MG: 50 TABLET ORAL at 21:40

## 2021-09-26 RX ADMIN — OXYCODONE HYDROCHLORIDE AND ACETAMINOPHEN 2000 MG: 500 TABLET ORAL at 09:03

## 2021-09-26 RX ADMIN — HYDROXYZINE PAMOATE 25 MG: 25 CAPSULE ORAL at 21:40

## 2021-09-26 RX ADMIN — Medication 10 ML: at 21:40

## 2021-09-26 RX ADMIN — BUDESONIDE AND FORMOTEROL FUMARATE DIHYDRATE 2 PUFF: 160; 4.5 AEROSOL RESPIRATORY (INHALATION) at 08:25

## 2021-09-26 NOTE — PROGRESS NOTES
Hospitalist Progress Note   Admit Date:  2021  5:52 AM   Name:  Chery Jackson   Age:  64 y.o. Sex:  male  :  1965   MRN:  [id number]   Room:  [facility]    Presenting Complaint: Shortness of Breath    Reason(s) for Admission: Acute hypoxemic respiratory failure due to COVID-19 ([facility] [U07.1, J96.01]     Hospital Course & Interval History:   Thao Hawkins a 64 y. o. male with no significant past medical history who presented to ED with worsening shortness of breath and fatigue. Reports symptoms started around  and was tested positive for COVID19 on .  His symptoms were mild initially and therefore he did not seek any medical evaluation. [facility] was seen on  in the [address] [place] [address] ED with worsening lethargy, shortness of breath.  He was saturating well on room air and therefore was discharged home with Decadron.  He reports that he took all but 2 pills of Decadron since then but his symptoms have worsened. [facility] has been checking his oxygen saturation at home and has been consistently staying in mid 80s past couple days. [place] reporting poor appetite, loss of taste and smell, nausea, dry cough, fever and chills.       In the ED, patient was found to be 84% on room air.  Saturation improved with O2 supplement.  Currently on 15 L high flow nasal cannula plus nonrebreather to maintain saturation of 94%.  Laboratory work up is remarkable for WBC 11.2, BUN 16, creatinine 1.03,  proCalcitonin of 0.14, CRP of 6.6 x-ray with diffuse bilateral lung infiltrates.  EKG showed sinus rhythm with nonspecific T and ST wave abnormalities.        SARS-CoV-2 PCR ordered 2021 resulted 2020 POSITIVE     Went to bathroom to clean up without assistance and found with spo2 77%. Placed on 15L HFNC bc he was insistent on taking a shower and when placed back on optiflow 45/60% (previous settings), he was satting 80% and fio2 increased to 100%     Subjective (21):  No acute events ON. Sleeping better.  Use to smoke stopped 1 year. Does vape nicotine occasional   Review of Systems   Constitutional: Positive for malaise/fatigue. Respiratory: Positive for cough and shortness of breath. Psychiatric/Behavioral: The patient has insomnia. Assessment & Plan:     Principal Problem:    Acute hypoxemic respiratory failure due to COVID-19 // Sepsis 2/2 COVID PNA    COVID+9/6 (PCR+9/18)   Unchanged 45/60 Wean FIO2 as tolerated. - CT chest 9/19/2021 NEG for acute PE  - cont baricitinib, decadron  - awake proning   - cont symbicort, mucinex, ocean spray, flonase,   - reduce vitamins doses  - add tiotriopium due to history smoking   - DC cetirizine. 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 trazodone nightly     GERD - PPI     Transaminitis - 2/2 covid. Cont to monitor     Thrombocytosis - reactive to infection     Former smoker - quit 1 year ago.        Dispo/Discharge Planning:      Home when stable     Diet:  ADULT DIET Regular  DVT PPx: lovenox   Code status: Full Code    Hospital Problems as of 9/26/2021 Date Reviewed: 12/23/2020        Codes Class Noted - Resolved POA    Transaminitis ICD-10-CM: R74.01  ICD-9-CM: 790.4  9/23/2021 - Present Yes        Hypoalbuminemia ICD-10-CM: E88.09  ICD-9-CM: 273.8  9/23/2021 - Present Clinically Undetermined        Thrombocytosis ICD-10-CM: D47.3  ICD-9-CM: 238.71  9/23/2021 - Present Yes        Pneumonia due to COVID-19 virus ICD-10-CM: U07.1, J12.82  ICD-9-CM: 480.8, 079.89  9/23/2021 - Present Yes        Sleep disorder due to a general medical condition, insomnia type ICD-10-CM: G47.01  ICD-9-CM: 327.01  9/22/2021 - Present Yes        * (Principal) Acute hypoxemic respiratory failure due to COVID-19 [facility]) ICD-10-CM: U07.1, J96.01  ICD-9-CM: 518.81, 079.89, 799.02  9/18/2021 - Present Yes              Objective:     Patient Vitals for the past 24 hrs:   Temp Pulse Resp BP SpO2   09/26/21 1545 97.7 °F (36.5 °C) 66 16 115/67 96 %   09/26/21 1042 97.9 °F (36.6 °C) 62 16 129/71 95 %   09/26/21 0825     95 %   09/26/21 0709 97.6 °F (36.4 °C) 64 17 (!) 97/56 98 %   09/26/21 0403 97.7 °F (36.5 °C) 67 18 (!) 96/55    09/26/21 0009 97.7 °F (36.5 °C) 81 18 112/69 90 %   09/25/21 2155     97 %   09/25/21 2014 97.8 °F (36.6 °C) 66 18 114/71 93 %     Oxygen Therapy  O2 Sat (%): 96 % (09/26/21 1545)  Pulse via Oximetry: 67 beats per minute (09/26/21 0825)  O2 Device: Heated; Hi flow nasal cannula (09/26/21 0825)  Skin Assessment: Clean, dry, & intact (09/26/21 0009)  Skin Protection for O2 Device: No (09/26/21 0009)  O2 Flow Rate (L/min): 45 l/min (09/26/21 0825)  O2 Temperature: 87.8 °F (31 °C) (09/24/21 0831)  FIO2 (%): 60 % (09/26/21 0825)    Estimated body mass index is 22.6 kg/m² as calculated from the following:    Height as of this encounter: 6' 1\" (1.854 m). Weight as of this encounter: 77.7 kg (171 lb 4.8 oz). Intake/Output Summary (Last 24 hours) at 9/26/2021 1626  Last data filed at 9/26/2021 1545  Gross per 24 hour   Intake 600 ml   Output 400 ml   Net 200 ml         Physical Exam:   Physical Exam  Vitals and nursing note reviewed. Constitutional:       Appearance: Normal appearance. Cardiovascular:      Rate and Rhythm: Normal rate and regular rhythm. Pulmonary:      Effort: Pulmonary effort is normal. No respiratory distress. Breath sounds: Normal breath sounds. Neurological:      General: No focal deficit present. Mental Status: He is alert.        I have reviewed ordered lab tests and independently visualized imaging below:    Last 24hr Labs:  Recent Results (from the past 24 hour(s))   CBC W/O DIFF    Collection Time: 09/26/21  7:02 AM   Result Value Ref Range    WBC 10.1 4.3 - 11.1 K/uL    RBC 4.30 4.23 - 5.6 M/uL    HGB 13.1 (L) 13.6 - 17.2 g/dL    HCT 38.6 (L) 41.1 - 50.3 %    MCV 89.8 79.6 - 97.8 FL    MCH 30.5 26.1 - 32.9 PG    MCHC 33.9 31.4 - 35.0 g/dL    RDW 12.1 11.9 - 14.6 %    PLATELET 415 150 - 450 K/uL    MPV 9.8 9.4 - 12.3 FL    ABSOLUTE NRBC 0.00 0.0 - 0.2 K/uL   METABOLIC PANEL, COMPREHENSIVE    Collection Time: 09/26/21  7:02 AM   Result Value Ref Range    Sodium 137 (L) 138 - 145 mmol/L    Potassium 3.8 3.5 - 5.1 mmol/L    Chloride 103 98 - 107 mmol/L    CO2 25 21 - 32 mmol/L    Anion gap 9 7 - 16 mmol/L    Glucose 119 (H) 65 - 100 mg/dL    BUN 19 6 - 23 MG/DL    Creatinine 0.87 0.8 - 1.5 MG/DL    GFR est AA >60 >60 ml/min/1.73m2    GFR est non-AA >60 >60 ml/min/1.73m2    Calcium 8.7 8.3 - 10.4 MG/DL    Bilirubin, total 0.4 0.2 - 1.1 MG/DL    ALT (SGPT) 60 12 - 65 U/L    AST (SGOT) 21 15 - 37 U/L    Alk. phosphatase 168 (H) 50 - 136 U/L    Protein, total 6.1 (L) 6.3 - 8.2 g/dL    Albumin 2.2 (L) 3.5 - 5.0 g/dL    Globulin 3.9 (H) 2.3 - 3.5 g/dL    A-G Ratio 0.6 (L) 1.2 - 3.5         All Micro Results     Procedure Component Value Units Date/Time    CULTURE, BLOOD [[id number]] Collected: 09/18/21 0638    Order Status: Completed Specimen: Blood Updated: 09/23/21 0711     Special Requests: --        LEFT  Antecubital       Culture result: NO GROWTH 5 DAYS       CULTURE, BLOOD [[id number]] Collected: 09/18/21 0649    Order Status: Completed Specimen: Blood Updated: 09/23/21 0711     Special Requests: --        RIGHT  Antecubital       Culture result: NO GROWTH 5 DAYS             Other Studies:  No results found.     Current Meds:  Current Facility-Administered Medications   Medication Dose Route Frequency    hydrOXYzine pamoate (VISTARIL) capsule 25 mg  25 mg Oral TID    tiotropium bromide (SPIRIVA RESPIMAT) 2.5 mcg /actuation  2 Puff Inhalation DAILY    [START ON 9/27/2021] cholecalciferol (VITAMIN D3) (1000 Units /25 mcg) tablet 2,000 Units  2,000 Units Oral DAILY    [START ON 9/27/2021] ascorbic acid (vitamin C) (VITAMIN C) tablet 1,000 mg  1,000 mg Oral DAILY    dexAMETHasone (DECADRON) tablet 6 mg  6 mg Oral Q24H    sodium chloride (OCEAN) 0.65 % nasal squeeze bottle 2 Spray  2 Spray Both Nostrils TID    traZODone (DESYREL) tablet 200 mg  200 mg Oral QHS    guaiFENesin ER (MUCINEX) tablet 1,200 mg  1,200 mg Oral Q12H    budesonide-formoteroL (SYMBICORT) 160-4.5 mcg/actuation HFA inhaler 2 Puff  2 Puff Inhalation BID RT    pantoprazole (PROTONIX) tablet 40 mg  40 mg Oral ACB    baricitinib (OLUMIANT) tablet 4 mg  4 mg Oral DAILY    fluticasone propionate (FLONASE) 50 mcg/actuation nasal spray 2 Spray  2 Spray Both Nostrils BID    zinc sulfate (ZINCATE) 50 mg zinc (220 mg) capsule 1 Capsule  1 Capsule Oral DAILY    guaiFENesin-codeine (ROBITUSSIN AC) 100-10 mg/5 mL solution 10 mL  10 mL Oral Q4H PRN    sodium chloride (NS) flush 5-40 mL  5-40 mL IntraVENous Q8H    sodium chloride (NS) flush 5-40 mL  5-40 mL IntraVENous PRN    acetaminophen (TYLENOL) tablet 650 mg  650 mg Oral Q6H PRN    Or    acetaminophen (TYLENOL) suppository 650 mg  650 mg Rectal Q6H PRN    polyethylene glycol (MIRALAX) packet 17 g  17 g Oral DAILY PRN    promethazine (PHENERGAN) tablet 12.5 mg  12.5 mg Oral Q6H PRN    Or    ondansetron (ZOFRAN) injection 4 mg  4 mg IntraVENous Q6H PRN    enoxaparin (LOVENOX) injection 40 mg  40 mg SubCUTAneous DAILY       Signed:  Alannah Fournier DO    Part of this note may have been written by using a voice dictation software. The note has been proof read but may still contain some grammatical/other typographical errors.

## 2021-09-27 LAB
ALBUMIN SERPL-MCNC: 2.3 G/DL (ref 3.5–5)
ALBUMIN/GLOB SERPL: 0.6 {RATIO} (ref 1.2–3.5)
ALP SERPL-CCNC: 156 U/L (ref 50–136)
ALT SERPL-CCNC: 56 U/L (ref 12–65)
ANION GAP SERPL CALC-SCNC: 6 MMOL/L (ref 7–16)
AST SERPL-CCNC: 21 U/L (ref 15–37)
BILIRUB SERPL-MCNC: 0.3 MG/DL (ref 0.2–1.1)
BNP SERPL-MCNC: 51 PG/ML (ref 5–125)
BUN SERPL-MCNC: 18 MG/DL (ref 6–23)
CALCIUM SERPL-MCNC: 8.6 MG/DL (ref 8.3–10.4)
CHLORIDE SERPL-SCNC: 104 MMOL/L (ref 98–107)
CO2 SERPL-SCNC: 27 MMOL/L (ref 21–32)
CREAT SERPL-MCNC: 0.98 MG/DL (ref 0.8–1.5)
CRP SERPL-MCNC: <0.3 MG/DL (ref 0–0.9)
D DIMER PPP FEU-MCNC: 0.29 UG/ML(FEU)
ERYTHROCYTE [DISTWIDTH] IN BLOOD BY AUTOMATED COUNT: 11.9 % (ref 11.9–14.6)
GLOBULIN SER CALC-MCNC: 3.7 G/DL (ref 2.3–3.5)
GLUCOSE SERPL-MCNC: 108 MG/DL (ref 65–100)
HCT VFR BLD AUTO: 38.8 % (ref 41.1–50.3)
HGB BLD-MCNC: 13.1 G/DL (ref 13.6–17.2)
MCH RBC QN AUTO: 29.7 PG (ref 26.1–32.9)
MCHC RBC AUTO-ENTMCNC: 33.8 G/DL (ref 31.4–35)
MCV RBC AUTO: 88 FL (ref 79.6–97.8)
NRBC # BLD: 0 K/UL (ref 0–0.2)
PLATELET # BLD AUTO: 420 K/UL (ref 150–450)
PMV BLD AUTO: 10 FL (ref 9.4–12.3)
POTASSIUM SERPL-SCNC: 4.2 MMOL/L (ref 3.5–5.1)
PROT SERPL-MCNC: 6 G/DL (ref 6.3–8.2)
RBC # BLD AUTO: 4.41 M/UL (ref 4.23–5.6)
SODIUM SERPL-SCNC: 137 MMOL/L (ref 136–145)
WBC # BLD AUTO: 11.6 K/UL (ref 4.3–11.1)

## 2021-09-27 PROCEDURE — 36415 COLL VENOUS BLD VENIPUNCTURE: CPT

## 2021-09-27 PROCEDURE — 94640 AIRWAY INHALATION TREATMENT: CPT

## 2021-09-27 PROCEDURE — 77010033711 HC HIGH FLOW OXYGEN

## 2021-09-27 PROCEDURE — 85379 FIBRIN DEGRADATION QUANT: CPT

## 2021-09-27 PROCEDURE — 86140 C-REACTIVE PROTEIN: CPT

## 2021-09-27 PROCEDURE — 74011250636 HC RX REV CODE- 250/636: Performed by: INTERNAL MEDICINE

## 2021-09-27 PROCEDURE — 97535 SELF CARE MNGMENT TRAINING: CPT

## 2021-09-27 PROCEDURE — 74011250636 HC RX REV CODE- 250/636: Performed by: FAMILY MEDICINE

## 2021-09-27 PROCEDURE — 74011250637 HC RX REV CODE- 250/637: Performed by: FAMILY MEDICINE

## 2021-09-27 PROCEDURE — 85027 COMPLETE CBC AUTOMATED: CPT

## 2021-09-27 PROCEDURE — 74011250637 HC RX REV CODE- 250/637: Performed by: HOSPITALIST

## 2021-09-27 PROCEDURE — 80053 COMPREHEN METABOLIC PANEL: CPT

## 2021-09-27 PROCEDURE — 83880 ASSAY OF NATRIURETIC PEPTIDE: CPT

## 2021-09-27 PROCEDURE — 74011250637 HC RX REV CODE- 250/637: Performed by: PHYSICIAN ASSISTANT

## 2021-09-27 PROCEDURE — 65270000029 HC RM PRIVATE

## 2021-09-27 PROCEDURE — 94762 N-INVAS EAR/PLS OXIMTRY CONT: CPT

## 2021-09-27 RX ORDER — LANOLIN ALCOHOL/MO/W.PET/CERES
100 CREAM (GRAM) TOPICAL DAILY
Status: DISCONTINUED | OUTPATIENT
Start: 2021-09-27 | End: 2021-10-02 | Stop reason: HOSPADM

## 2021-09-27 RX ADMIN — Medication 1000 MG: at 09:07

## 2021-09-27 RX ADMIN — Medication 5 ML: at 13:20

## 2021-09-27 RX ADMIN — HYDROXYZINE PAMOATE 25 MG: 25 CAPSULE ORAL at 09:10

## 2021-09-27 RX ADMIN — Medication 100 MG: at 09:11

## 2021-09-27 RX ADMIN — SALINE NASAL SPRAY 2 SPRAY: 1.5 SOLUTION NASAL at 09:00

## 2021-09-27 RX ADMIN — DEXAMETHASONE 6 MG: 4 TABLET ORAL at 09:09

## 2021-09-27 RX ADMIN — HYDROXYZINE PAMOATE 25 MG: 25 CAPSULE ORAL at 15:20

## 2021-09-27 RX ADMIN — TIOTROPIUM BROMIDE INHALATION SPRAY 2 PUFF: 3.12 SPRAY, METERED RESPIRATORY (INHALATION) at 09:00

## 2021-09-27 RX ADMIN — FLUTICASONE PROPIONATE 2 SPRAY: 50 SPRAY, METERED NASAL at 09:00

## 2021-09-27 RX ADMIN — FLUTICASONE PROPIONATE 2 SPRAY: 50 SPRAY, METERED NASAL at 21:22

## 2021-09-27 RX ADMIN — VITAMIN D, TAB 1000IU (100/BT) 2000 UNITS: 25 TAB at 09:09

## 2021-09-27 RX ADMIN — HYDROXYZINE PAMOATE 25 MG: 25 CAPSULE ORAL at 21:22

## 2021-09-27 RX ADMIN — ENOXAPARIN SODIUM 40 MG: 40 INJECTION SUBCUTANEOUS at 09:09

## 2021-09-27 RX ADMIN — BUDESONIDE AND FORMOTEROL FUMARATE DIHYDRATE 2 PUFF: 160; 4.5 AEROSOL RESPIRATORY (INHALATION) at 08:00

## 2021-09-27 RX ADMIN — SALINE NASAL SPRAY 2 SPRAY: 1.5 SOLUTION NASAL at 16:00

## 2021-09-27 RX ADMIN — BARICITINIB 4 MG: 2 TABLET, FILM COATED ORAL at 09:08

## 2021-09-27 RX ADMIN — Medication 10 ML: at 21:22

## 2021-09-27 RX ADMIN — GUAIFENESIN 1200 MG: 600 TABLET ORAL at 09:10

## 2021-09-27 RX ADMIN — PANTOPRAZOLE SODIUM 40 MG: 40 TABLET, DELAYED RELEASE ORAL at 05:38

## 2021-09-27 RX ADMIN — TRAZODONE HYDROCHLORIDE 200 MG: 50 TABLET ORAL at 21:22

## 2021-09-27 RX ADMIN — BUDESONIDE AND FORMOTEROL FUMARATE DIHYDRATE 2 PUFF: 160; 4.5 AEROSOL RESPIRATORY (INHALATION) at 20:00

## 2021-09-27 RX ADMIN — SALINE NASAL SPRAY 2 SPRAY: 1.5 SOLUTION NASAL at 22:00

## 2021-09-27 RX ADMIN — GUAIFENESIN 1200 MG: 600 TABLET ORAL at 21:22

## 2021-09-27 RX ADMIN — Medication 10 ML: at 05:38

## 2021-09-27 NOTE — PROGRESS NOTES
Hospitalist Progress Note   Admit Date:  2021  5:52 AM   Name:  Yumiko Watt   Age:  64 y.o. Sex:  male  :  1965   MRN:  [id number]   Room:  [facility]    Presenting Complaint: Shortness of Breath    Reason(s) for Admission: Acute hypoxemic respiratory failure due to COVID-19 ([facility] [U07.1, J96.01]     Hospital Course & Interval History:   Yovani Loco a 64 y. o. male with PMHx Former smoker (quit 1 year ago), Earlimart Julio presented to ED with worsening SOB and fatigue. Reports symptoms started around  and was tested positive for COVID19 on .  His symptoms were mild initially and therefore he did not seek any medical evaluation. [facility] was seen on  in the [facility] ED with worsening lethargy, shortness of breath.  He was saturating well on room air and therefore was discharged home with Decadron.  He reports that he took all but 2 pills of Decadron since then but his symptoms have worsened. [facility] has been checking his oxygen saturation at home and has been consistently staying in mid 80s past couple days. John Paredes reporting poor appetite, loss of taste and smell, nausea, dry cough, fever and chills.       In the ED, patient was found to be 84% on room air.  Saturation improved with O2 supplement.  Currently on 15 L high flow nasal cannula plus nonrebreather to maintain saturation of 94%.  Laboratory work up is remarkable for WBC 11.2, BUN 16, creatinine 1.03,  proCalcitonin of 0.14, CRP of 6.6 x-ray with diffuse bilateral lung infiltrates.  EKG showed sinus rhythm with nonspecific T and ST wave abnormalities.        SARS-CoV-2 PCR ordered 2021 resulted 2020 POSITIVE     Went to bathroom to clean up without assistance and found with spo2 77%. Placed on 15L HFNC bc he was insistent on taking a shower and when placed back on optiflow 45/60% (previous settings), he was satting 80% and fio2 increased to 100%     Subjective (21): No acute events ON.  Sleeping on back with no increased WOB. No complaints new complaints today. Sleeping better. Review of Systems   Constitutional: Positive for malaise/fatigue. HENT: Positive for congestion. Respiratory: Positive for cough and shortness of breath. Neurological: Positive for weakness. Psychiatric/Behavioral: The patient has insomnia. Assessment & Plan:     Principal Problem:    Acute hypoxemic respiratory failure due to COVID-19 // Sepsis 2/2 COVID PNA    COVID+9/6 (PCR+9/18)   improving 45/55 Wean FIO2 as tolerated. - CT chest 9/19/2021 NEG for acute PE  - cont baricitinib, decadron  - awake proning   - cont symbicort, tiotriopium due to history smoking   - cont PT/OT   - check d dimer, crp, BNP this am unrevealing. Insomnia 2/2 [address] - cont. trazodone nightly     GERD - PPI     Transaminitis - 2/2 covid. Cont to monitor     Thrombocytosis - reactive to infection     Former smoker - quit 1 year ago.        Dispo/Discharge Planning:      Home w/ HHwhen stable     Diet:  ADULT DIET Regular  DVT PPx: lovenox   Code status: Full Code    Hospital Problems as of 9/27/2021 Date Reviewed: 12/23/2020        Codes Class Noted - Resolved POA    Transaminitis ICD-10-CM: R74.01  ICD-9-CM: 790.4  9/23/2021 - Present Yes        Hypoalbuminemia ICD-10-CM: E88.09  ICD-9-CM: 273.8  9/23/2021 - Present Clinically Undetermined        Thrombocytosis ICD-10-CM: D47.3  ICD-9-CM: 238.71  9/23/2021 - Present Yes        Pneumonia due to COVID-19 virus ICD-10-CM: U07.1, J12.82  ICD-9-CM: 480.8, 079.89  9/23/2021 - Present Yes        Sleep disorder due to a general medical condition, insomnia type ICD-10-CM: G47.01  ICD-9-CM: 327.01  9/22/2021 - Present Yes        * (Principal) Acute hypoxemic respiratory failure due to COVID-19 [facility]) ICD-10-CM: U07.1, J96.01  ICD-9-CM: 518.81, 079.89, 799.02  9/18/2021 - Present Yes              Objective:     Patient Vitals for the past 24 hrs:   Temp Pulse Resp BP SpO2   09/27/21 0853     96 % 09/27/21 0653 97.8 °F (36.6 °C) 70 20 98/64 98 %   09/27/21 0340 97.7 °F (36.5 °C) 63 18 102/65 97 %   09/26/21 2244 97.5 °F (36.4 °C) 84 16 109/70 97 %   09/26/21 2005     97 %   09/26/21 1854 97.7 °F (36.5 °C) 72 18 109/70 100 %   09/26/21 1545 97.7 °F (36.5 °C) 66 16 115/67 96 %   09/26/21 1042 97.9 °F (36.6 °C) 62 16 129/71 95 %     Oxygen Therapy  O2 Sat (%): 96 % (09/27/21 0853)  Pulse via Oximetry: 74 beats per minute (09/27/21 0853)  O2 Device: Heated; Hi flow nasal cannula (09/27/21 0853)  Skin Assessment: Clean, dry, & intact (09/26/21 2244)  Skin Protection for O2 Device: No (09/26/21 2244)  O2 Flow Rate (L/min): 45 l/min (09/27/21 0853)  O2 Temperature: 87.8 °F (31 °C) (09/27/21 0853)  FIO2 (%): 55 % (09/27/21 0853)    Estimated body mass index is 23.62 kg/m² as calculated from the following:    Height as of this encounter: 6' 1\" (1.854 m). Weight as of this encounter: 81.2 kg (179 lb 0.2 oz). Intake/Output Summary (Last 24 hours) at 9/27/2021 1038  Last data filed at 9/27/2021 1022  Gross per 24 hour   Intake 760 ml   Output 1650 ml   Net -890 ml         Physical Exam:   Physical Exam  Vitals and nursing note reviewed. Constitutional:       Appearance: Normal appearance. Cardiovascular:      Rate and Rhythm: Normal rate and regular rhythm. Pulmonary:      Effort: Pulmonary effort is normal. No respiratory distress. Breath sounds: Normal breath sounds. Comments: Good air movement. Very faint crackles  Musculoskeletal:      Right lower leg: No edema. Left lower leg: No edema. Neurological:      General: No focal deficit present. Mental Status: He is alert.        I have reviewed ordered lab tests and independently visualized imaging below:    Last 24hr Labs:  Recent Results (from the past 24 hour(s))   METABOLIC PANEL, COMPREHENSIVE    Collection Time: 09/27/21  5:34 AM   Result Value Ref Range    Sodium 137 136 - 145 mmol/L    Potassium 4.2 3.5 - 5.1 mmol/L Chloride 104 98 - 107 mmol/L    CO2 27 21 - 32 mmol/L    Anion gap 6 (L) 7 - 16 mmol/L    Glucose 108 (H) 65 - 100 mg/dL    BUN 18 6 - 23 MG/DL    Creatinine 0.98 0.8 - 1.5 MG/DL    GFR est AA >60 >60 ml/min/1.73m2    GFR est non-AA >60 >60 ml/min/1.73m2    Calcium 8.6 8.3 - 10.4 MG/DL    Bilirubin, total 0.3 0.2 - 1.1 MG/DL    ALT (SGPT) 56 12 - 65 U/L    AST (SGOT) 21 15 - 37 U/L    Alk. phosphatase 156 (H) 50 - 136 U/L    Protein, total 6.0 (L) 6.3 - 8.2 g/dL    Albumin 2.3 (L) 3.5 - 5.0 g/dL    Globulin 3.7 (H) 2.3 - 3.5 g/dL    A-G Ratio 0.6 (L) 1.2 - 3.5     CBC W/O DIFF    Collection Time: 09/27/21  5:34 AM   Result Value Ref Range    WBC 11.6 (H) 4.3 - 11.1 K/uL    RBC 4.41 4.23 - 5.6 M/uL    HGB 13.1 (L) 13.6 - 17.2 g/dL    HCT 38.8 (L) 41.1 - 50.3 %    MCV 88.0 79.6 - 97.8 FL    MCH 29.7 26.1 - 32.9 PG    MCHC 33.8 31.4 - 35.0 g/dL    RDW 11.9 11.9 - 14.6 %    PLATELET 560 481 - 396 K/uL    MPV 10.0 9.4 - 12.3 FL    ABSOLUTE NRBC 0.00 0.0 - 0.2 K/uL   D DIMER    Collection Time: 09/27/21  5:34 AM   Result Value Ref Range    D DIMER 0.29 <0.56 ug/ml(FEU)   C REACTIVE PROTEIN, QT    Collection Time: 09/27/21  5:34 AM   Result Value Ref Range    C-Reactive protein <0.3 0.0 - 0.9 mg/dL   NT-PRO BNP    Collection Time: 09/27/21  5:34 AM   Result Value Ref Range    NT pro-BNP 51 5 - 125 PG/ML       All Micro Results     Procedure Component Value Units Date/Time    CULTURE, BLOOD [[id number]] Collected: 09/18/21 0638    Order Status: Completed Specimen: Blood Updated: 09/23/21 0711     Special Requests: --        LEFT  Antecubital       Culture result: NO GROWTH 5 DAYS       CULTURE, BLOOD [[id number]] Collected: 09/18/21 0649    Order Status: Completed Specimen: Blood Updated: 09/23/21 0711     Special Requests: --        RIGHT  Antecubital       Culture result: NO GROWTH 5 DAYS             Other Studies:  No results found.     Current Meds:  Current Facility-Administered Medications   Medication Dose Route Frequency    thiamine HCL (B-1) tablet 100 mg  100 mg Oral DAILY    hydrOXYzine pamoate (VISTARIL) capsule 25 mg  25 mg Oral TID    tiotropium bromide (SPIRIVA RESPIMAT) 2.5 mcg /actuation  2 Puff Inhalation DAILY    cholecalciferol (VITAMIN D3) (1000 Units /25 mcg) tablet 2,000 Units  2,000 Units Oral DAILY    ascorbic acid (vitamin C) (VITAMIN C) tablet 1,000 mg  1,000 mg Oral DAILY    sodium chloride (OCEAN) 0.65 % nasal squeeze bottle 2 Spray  2 Spray Both Nostrils TID    traZODone (DESYREL) tablet 200 mg  200 mg Oral QHS    guaiFENesin ER (MUCINEX) tablet 1,200 mg  1,200 mg Oral Q12H    budesonide-formoteroL (SYMBICORT) 160-4.5 mcg/actuation HFA inhaler 2 Puff  2 Puff Inhalation BID RT    pantoprazole (PROTONIX) tablet 40 mg  40 mg Oral ACB    baricitinib (OLUMIANT) tablet 4 mg  4 mg Oral DAILY    fluticasone propionate (FLONASE) 50 mcg/actuation nasal spray 2 Spray  2 Spray Both Nostrils BID    guaiFENesin-codeine (ROBITUSSIN AC) 100-10 mg/5 mL solution 10 mL  10 mL Oral Q4H PRN    sodium chloride (NS) flush 5-40 mL  5-40 mL IntraVENous Q8H    sodium chloride (NS) flush 5-40 mL  5-40 mL IntraVENous PRN    acetaminophen (TYLENOL) tablet 650 mg  650 mg Oral Q6H PRN    Or    acetaminophen (TYLENOL) suppository 650 mg  650 mg Rectal Q6H PRN    polyethylene glycol (MIRALAX) packet 17 g  17 g Oral DAILY PRN    promethazine (PHENERGAN) tablet 12.5 mg  12.5 mg Oral Q6H PRN    Or    ondansetron (ZOFRAN) injection 4 mg  4 mg IntraVENous Q6H PRN    enoxaparin (LOVENOX) injection 40 mg  40 mg SubCUTAneous DAILY       Signed:  Guevara Cleveland DO    Part of this note may have been written by using a voice dictation software. The note has been proof read but may still contain some grammatical/other typographical errors.

## 2021-09-27 NOTE — PROGRESS NOTES
PT Daily Note:  Attempted to see patient for physical therapy this morning but patient was sleeping soundly upon contact. Will check back on patient at a later date/time if schedule permits.   Thank you,  Paula Mason, PTA

## 2021-09-27 NOTE — PROGRESS NOTES
Patient alert and verbal x4. Patient resting in bed. Bed in low position and wheels locked, call light within reach. Patient got out of bed for short period of time today to bedside chair, assist of one staff member. Hourly rounds completed this shift. VSS. No c/o pain. IV site SL. Patient cont on airvo 45L/60%. Report given to night shift nurse.

## 2021-09-27 NOTE — PROGRESS NOTES
ACUTE OT GOALS:  (Developed with and agreed upon by patient and/or caregiver.)  1) Patient will complete lower body bathing and dressing with MOD I and adaptive equipment as needed. 2) Patient will complete toileting with MOD I.   3) Patient will complete functional transfers INDEPENDENTLY. 4) Patient will tolerate at least 25 minutes of OT activity with AS NEEDED rest breaks while maintaining O2 sats >90%. 5) Patient will verbalize at least 3 energy conservation technique to utilize during ADL/IADL. Timeframe: 7 visits     OCCUPATIONAL THERAPY: Daily Note OT Treatment Day # 2    Mindy Hernandez is a 64 y.o. male   PRIMARY DIAGNOSIS: Acute hypoxemic respiratory failure due to COVID-19 ([facility]  Acute hypoxemic respiratory failure due to COVID-19 ([facility]) [U07.1, J96.01]       Payor: [facility] / Plan: [address] [place] [address] HMO/CHOICE PLUS/POS / Product Type: HMO /   ASSESSMENT:     REHAB RECOMMENDATIONS: CURRENT LEVEL OF FUNCTION:  (Most Recently Demonstrated)   Recommendation to date pending progress:  [address] [place] [address] Therapy  Equipment:    Shower Chair Bathing:   Not tested  Dressing:   Standby Assistance  Feeding/Grooming:   Set Up  Toileting:   Not tested  Functional Mobility:   Contact Guard Assistance     ASSESSMENT:  Mr. Moe Field  is a 63 y/o male presents with acute respiratory failure due to [facility] 19. Today pt presents with decreased activity tolerance, balance and strength impacting ADLs. Pt is currently on 45L 55% Airvo and sats 97% at rest. Pt completed functional transfers in prep for ADLs, LE dressing and grooming ADLs in grid below. Pt educated on pursed lip breathing, energy conservation techniques and ways to continue to exercise/increase activity tolerance while in room. Pt O2 sats ranged 87-97% throughout. Pt is making progress toward goals. Continue POC. SUBJECTIVE:   Mr. Moe Field states, \"I've been wanting to get up. \"    SOCIAL HISTORY/LIVING ENVIRONMENT: lives with wife, one level home, no DME in home, tub shower, works as a   Home Environment: Private residence  # Steps to Enter: 3  One/Two Story Residence: One story  Living Alone: No  Support Systems: Spouse/Significant Other    OBJECTIVE:     PAIN: VITAL SIGNS: LINES/DRAINS:   Pre Treatment: Pain Screen  Pain Scale 1: Numeric (0 - 10)  Pain Intensity 1: 0  Post Treatment: 0 Vital Signs  O2 Device: Heated; Hi flow nasal cannula  O2 Flow Rate (L/min): 45 l/min  FIO2 (%): 55 % Continuous Pulse Oximetry  O2 Device: Heated, Hi flow nasal cannula     ACTIVITIES OF DAILY LIVING: I Mod I S SBA CGA Min Mod Max Total NT Comments   BASIC ADLs:              Bathing/ Showering [] [] [] [] [] [] [] [] [] [x]    Toileting [] [] [] [] [] [] [] [] [] [x]    Dressing [] [] [] [x] [] [] [] [] [] [] Donning socks EOB   Feeding [x] [] [] [] [] [] [] [] [] [] Drinking from cup   Grooming [] [] [x] [] [] [] [] [] [] [] Lathering shampoo cap with BUE, drying hair and combing hair, brushing teeth and washing face all while seated in chair   Personal Device Care [] [] [] [] [] [] [] [] [] [x]    Functional Mobility [] [] [] [x] [x] [] [] [] [] [] No AD   I=Independent, Mod I=Modified Independent, S=Supervision, SBA=Standby Assistance, CGA=Contact Guard Assistance,   Min=Minimal Assistance, Mod=Moderate Assistance, Max=Maximal Assistance, Total=Total Assistance, NT=Not Tested    MOBILITY: I Mod I S SBA CGA Min Mod Max Total  NT x2 Comments:   Supine to sit [] [] [x] [] [] [] [] [] [] [] [] supervision   Sit to supine [] [] [] [] [] [] [] [] [] [x] []    Sit to stand [] [] [] [x] [] [] [] [] [] [] [] No AD   Bed to chair [] [] [] [] [x] [] [] [] [] [] [] No AD   I=Independent, Mod I=Modified Independent, S=Supervision, SBA=Standby Assistance, CGA=Contact Guard Assistance,   Min=Minimal Assistance, Mod=Moderate Assistance, Max=Maximal Assistance, Total=Total Assistance, NT=Not Tested    BALANCE: Good Fair+ Fair Fair- Poor NT Comments   Sitting Static [x] [] [] [] [] [] In chair   Sitting Dynamic [x] [] [] [] [] [] Grooming ADLs in chair             Standing Static [] [x] [] [] [] [] SBA at EOB   Standing Dynamic [] [] [x] [] [] [] CGA, 1 LOB when standing     PLAN:   FREQUENCY/DURATION: OT Plan of Care: 3 times/week for duration of hospital stay or until stated goals are met, whichever comes first.    TREATMENT:   TREATMENT:   ($$ Self Care/Home Management: 23-37 mins    )  Self Care (24 Minutes): Self care including Lower Body Dressing, Grooming, Energy Conservation Training and functional transfers in prep for ADLs to increase independence and decrease level of assistance required.     TREATMENT GRID:  N/A    AFTER TREATMENT POSITION/PRECAUTIONS:  Chair, Needs within reach and RN notified    INTERDISCIPLINARY COLLABORATION:  RN/PCT and OT/HINSON    TOTAL TREATMENT DURATION:  OT Patient Time In/Time Out  Time In: 4318  Time Out: [address], OT

## 2021-09-28 LAB
ALBUMIN SERPL-MCNC: 2.3 G/DL (ref 3.5–5)
ALBUMIN/GLOB SERPL: 0.6 {RATIO} (ref 1.2–3.5)
ALP SERPL-CCNC: 158 U/L (ref 50–136)
ALT SERPL-CCNC: 61 U/L (ref 12–65)
ANION GAP SERPL CALC-SCNC: 6 MMOL/L (ref 7–16)
AST SERPL-CCNC: 23 U/L (ref 15–37)
BILIRUB SERPL-MCNC: 0.3 MG/DL (ref 0.2–1.1)
BUN SERPL-MCNC: 19 MG/DL (ref 6–23)
CALCIUM SERPL-MCNC: 8.6 MG/DL (ref 8.3–10.4)
CHLORIDE SERPL-SCNC: 103 MMOL/L (ref 98–107)
CO2 SERPL-SCNC: 27 MMOL/L (ref 21–32)
CREAT SERPL-MCNC: 0.84 MG/DL (ref 0.8–1.5)
ERYTHROCYTE [DISTWIDTH] IN BLOOD BY AUTOMATED COUNT: 11.9 % (ref 11.9–14.6)
GLOBULIN SER CALC-MCNC: 3.9 G/DL (ref 2.3–3.5)
GLUCOSE SERPL-MCNC: 144 MG/DL (ref 65–100)
HCT VFR BLD AUTO: 38.4 % (ref 41.1–50.3)
HGB BLD-MCNC: 13.2 G/DL (ref 13.6–17.2)
MCH RBC QN AUTO: 30.4 PG (ref 26.1–32.9)
MCHC RBC AUTO-ENTMCNC: 34.4 G/DL (ref 31.4–35)
MCV RBC AUTO: 88.5 FL (ref 79.6–97.8)
NRBC # BLD: 0 K/UL (ref 0–0.2)
PLATELET # BLD AUTO: 397 K/UL (ref 150–450)
PMV BLD AUTO: 9.8 FL (ref 9.4–12.3)
POTASSIUM SERPL-SCNC: 4.2 MMOL/L (ref 3.5–5.1)
PROT SERPL-MCNC: 6.2 G/DL (ref 6.3–8.2)
RBC # BLD AUTO: 4.34 M/UL (ref 4.23–5.6)
SODIUM SERPL-SCNC: 136 MMOL/L (ref 138–145)
WBC # BLD AUTO: 9.6 K/UL (ref 4.3–11.1)

## 2021-09-28 PROCEDURE — 97530 THERAPEUTIC ACTIVITIES: CPT

## 2021-09-28 PROCEDURE — 74011250636 HC RX REV CODE- 250/636: Performed by: INTERNAL MEDICINE

## 2021-09-28 PROCEDURE — 65270000029 HC RM PRIVATE

## 2021-09-28 PROCEDURE — 36415 COLL VENOUS BLD VENIPUNCTURE: CPT

## 2021-09-28 PROCEDURE — 94762 N-INVAS EAR/PLS OXIMTRY CONT: CPT

## 2021-09-28 PROCEDURE — 94640 AIRWAY INHALATION TREATMENT: CPT

## 2021-09-28 PROCEDURE — 85027 COMPLETE CBC AUTOMATED: CPT

## 2021-09-28 PROCEDURE — 74011250637 HC RX REV CODE- 250/637: Performed by: FAMILY MEDICINE

## 2021-09-28 PROCEDURE — 80053 COMPREHEN METABOLIC PANEL: CPT

## 2021-09-28 PROCEDURE — 74011250637 HC RX REV CODE- 250/637: Performed by: PHYSICIAN ASSISTANT

## 2021-09-28 PROCEDURE — 77010033711 HC HIGH FLOW OXYGEN

## 2021-09-28 RX ADMIN — Medication 10 ML: at 13:13

## 2021-09-28 RX ADMIN — TIOTROPIUM BROMIDE INHALATION SPRAY 2 PUFF: 3.12 SPRAY, METERED RESPIRATORY (INHALATION) at 08:00

## 2021-09-28 RX ADMIN — HYDROXYZINE PAMOATE 25 MG: 25 CAPSULE ORAL at 08:32

## 2021-09-28 RX ADMIN — BARICITINIB 4 MG: 2 TABLET, FILM COATED ORAL at 08:32

## 2021-09-28 RX ADMIN — GUAIFENESIN 1200 MG: 600 TABLET ORAL at 21:36

## 2021-09-28 RX ADMIN — Medication 5 ML: at 21:36

## 2021-09-28 RX ADMIN — BUDESONIDE AND FORMOTEROL FUMARATE DIHYDRATE 2 PUFF: 160; 4.5 AEROSOL RESPIRATORY (INHALATION) at 20:00

## 2021-09-28 RX ADMIN — SALINE NASAL SPRAY 2 SPRAY: 1.5 SOLUTION NASAL at 22:00

## 2021-09-28 RX ADMIN — SALINE NASAL SPRAY 2 SPRAY: 1.5 SOLUTION NASAL at 15:19

## 2021-09-28 RX ADMIN — Medication 1000 MG: at 08:32

## 2021-09-28 RX ADMIN — FLUTICASONE PROPIONATE 2 SPRAY: 50 SPRAY, METERED NASAL at 08:33

## 2021-09-28 RX ADMIN — Medication 5 ML: at 22:00

## 2021-09-28 RX ADMIN — HYDROXYZINE PAMOATE 25 MG: 25 CAPSULE ORAL at 21:36

## 2021-09-28 RX ADMIN — TRAZODONE HYDROCHLORIDE 200 MG: 50 TABLET ORAL at 21:36

## 2021-09-28 RX ADMIN — BUDESONIDE AND FORMOTEROL FUMARATE DIHYDRATE 2 PUFF: 160; 4.5 AEROSOL RESPIRATORY (INHALATION) at 08:00

## 2021-09-28 RX ADMIN — PANTOPRAZOLE SODIUM 40 MG: 40 TABLET, DELAYED RELEASE ORAL at 05:39

## 2021-09-28 RX ADMIN — Medication 100 MG: at 08:32

## 2021-09-28 RX ADMIN — GUAIFENESIN 1200 MG: 600 TABLET ORAL at 08:32

## 2021-09-28 RX ADMIN — SALINE NASAL SPRAY 2 SPRAY: 1.5 SOLUTION NASAL at 08:33

## 2021-09-28 RX ADMIN — VITAMIN D, TAB 1000IU (100/BT) 2000 UNITS: 25 TAB at 08:32

## 2021-09-28 RX ADMIN — HYDROXYZINE PAMOATE 25 MG: 25 CAPSULE ORAL at 15:19

## 2021-09-28 RX ADMIN — Medication 10 ML: at 06:13

## 2021-09-28 RX ADMIN — ENOXAPARIN SODIUM 40 MG: 40 INJECTION SUBCUTANEOUS at 08:32

## 2021-09-28 RX ADMIN — FLUTICASONE PROPIONATE 2 SPRAY: 50 SPRAY, METERED NASAL at 21:00

## 2021-09-28 NOTE — PROGRESS NOTES
Patient will return home with [facility]. Referral has been completed. Dr[facility] by PCP. Referral has been made to [facility]. CM will continue to monitor.

## 2021-09-28 NOTE — PROGRESS NOTES
Hospitalist Progress Note   Admit Date:  2021  5:52 AM   Name:  Surya Calixto   Age:  64 y.o. Sex:  male  :  1965   MRN:  [id number]   Room:  [facility]    Presenting Complaint: Shortness of Breath    Reason(s) for Admission: Acute hypoxemic respiratory failure due to COVID-19 ([facility] 75.) [U07.1, J96.01]     Hospital Course & Interval History:   Minor Side a 64 y. o. male with PMHx Former smoker (quit 1 year ago), Alanis Carranza presented to ED with worsening SOB and fatigue. Reports symptoms started around  and was tested positive for COVID19 on .  His symptoms were mild initially and therefore he did not seek any medical evaluation. Leslie Schwartz was seen on  in the [facility] ED with worsening lethargy, shortness of breath.  He was saturating well on room air and therefore was discharged home with Decadron.  He reports that he took all but 2 pills of Decadron since then but his symptoms have worsened. Leslie Schwartz has been checking his oxygen saturation at home and has been consistently staying in mid 80s past couple days. Coye Quirk reporting poor appetite, loss of taste and smell, nausea, dry cough, fever and chills.       In the ED, patient was found to be 84% on room air.  Saturation improved with O2 supplement.  Currently on 15 L high flow nasal cannula plus nonrebreather to maintain saturation of 94%.  Laboratory work up is remarkable for WBC 11.2, BUN 16, creatinine 1.03,  proCalcitonin of 0.14, CRP of 6.6 x-ray with diffuse bilateral lung infiltrates.  EKG showed sinus rhythm with nonspecific T and ST wave abnormalities.        SARS-CoV-2 PCR ordered 2021 resulted 2020 POSITIVE     Went to bathroom to clean up without assistance and found with spo2 77%. Placed on 15L HFNC bc he was insistent on taking a shower and when placed back on optiflow 45/60% (previous settings), he was satting 80% and fio2 increased to 100%     : airvo to 7L     Subjective (21):   No acute events ON. Sleeping on back with no increased WOB. No complaints new complaints today. Weaned to 7L today. Review of Systems   Constitutional: Positive for malaise/fatigue. HENT: Positive for congestion. Respiratory: Positive for cough and shortness of breath. Neurological: Positive for weakness. Psychiatric/Behavioral: The patient has insomnia. Assessment & Plan:     Principal Problem:    Acute hypoxemic respiratory failure due to COVID-19 // Sepsis 2/2 COVID PNA    COVID+9/6 (PCR+9/18)   Improving. 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 trazodone nightly     GERD - PPI     Transaminitis - 2/2 covid. Cont to monitor     Thrombocytosis - reactive to infection     Former smoker - quit 1 year ago.        Dispo/Discharge Planning:      Home w/ HHwhen stable     Diet:  ADULT DIET Regular  ADULT ORAL NUTRITION SUPPLEMENT Breakfast, Lunch, Dinner; Standard High Calorie/High Protein  DVT PPx: lovenox   Code status: Full Code    Hospital Problems as of 9/28/2021 Date Reviewed: 12/23/2020        Codes Class Noted - Resolved POA    Transaminitis ICD-10-CM: R74.01  ICD-9-CM: 790.4  9/23/2021 - Present Yes        Hypoalbuminemia ICD-10-CM: E88.09  ICD-9-CM: 273.8  9/23/2021 - Present Clinically Undetermined        Thrombocytosis ICD-10-CM: D47.3  ICD-9-CM: 238.71  9/23/2021 - Present Yes        Pneumonia due to COVID-19 virus ICD-10-CM: U07.1, J12.82  ICD-9-CM: 480.8, 079.89  9/23/2021 - Present Yes        Sleep disorder due to a general medical condition, insomnia type ICD-10-CM: G47.01  ICD-9-CM: 327.01  9/22/2021 - Present Yes        * (Principal) Acute hypoxemic respiratory failure due to COVID-19 [facility]) ICD-10-CM: U07.1, J96.01  ICD-9-CM: 518.81, 079.89, 799.02  9/18/2021 - Present Yes              Objective: Patient Vitals for the past 24 hrs:   Temp Pulse Resp BP SpO2   09/28/21 1046 97.4 °F (36.3 °C) 69 25 124/81 93 %   09/28/21 0923     93 %   09/28/21 0743     95 %   09/28/21 0725 97.8 °F (36.6 °C) 68 18 125/88 96 %   09/28/21 0505 97.7 °F (36.5 °C) 64 19 121/79 98 %   09/28/21 0042 98.3 °F (36.8 °C) 66 19 98/67 98 %   09/27/21 2045     98 %   09/27/21 2017 98 °F (36.7 °C) 73 18 112/78 97 %   09/27/21 1631 97.7 °F (36.5 °C) 69 25 122/79 98 %     Oxygen Therapy  O2 Sat (%): 93 % (09/28/21 1046)  Pulse via Oximetry: 74 beats per minute (09/28/21 0923)  O2 Device: Hi flow nasal cannula (09/28/21 1410)  Skin Assessment: Clean, dry, & intact (09/26/21 2244)  Skin Protection for O2 Device: No (09/26/21 2244)  O2 Flow Rate (L/min): 7 l/min (09/28/21 1410)  O2 Temperature: 87.8 °F (31 °C) (09/28/21 0743)  FIO2 (%): 55 % (09/28/21 0743)    Estimated body mass index is 23.62 kg/m² as calculated from the following:    Height as of this encounter: 6' 1\" (1.854 m). Weight as of this encounter: 81.2 kg (179 lb 0.2 oz). Intake/Output Summary (Last 24 hours) at 9/28/2021 1509  Last data filed at 9/28/2021 1402  Gross per 24 hour   Intake 900 ml   Output 3925 ml   Net -3025 ml         Physical Exam:   Physical Exam  Vitals and nursing note reviewed. Constitutional:       Appearance: Normal appearance. Cardiovascular:      Rate and Rhythm: Normal rate and regular rhythm. Pulmonary:      Effort: Pulmonary effort is normal. No respiratory distress. Breath sounds: Normal breath sounds. Musculoskeletal:      Right lower leg: No edema. Left lower leg: No edema. Neurological:      General: No focal deficit present. Mental Status: He is alert.        I have reviewed ordered lab tests and independently visualized imaging below:    Last 24hr Labs:  Recent Results (from the past 24 hour(s))   METABOLIC PANEL, COMPREHENSIVE    Collection Time: 09/28/21  3:51 AM   Result Value Ref Range    Sodium 136 (L) 138 - 145 mmol/L    Potassium 4.2 3.5 - 5.1 mmol/L    Chloride 103 98 - 107 mmol/L    CO2 27 21 - 32 mmol/L    Anion gap 6 (L) 7 - 16 mmol/L    Glucose 144 (H) 65 - 100 mg/dL    BUN 19 6 - 23 MG/DL    Creatinine 0.84 0.8 - 1.5 MG/DL    GFR est AA >60 >60 ml/min/1.73m2    GFR est non-AA >60 >60 ml/min/1.73m2    Calcium 8.6 8.3 - 10.4 MG/DL    Bilirubin, total 0.3 0.2 - 1.1 MG/DL    ALT (SGPT) 61 12 - 65 U/L    AST (SGOT) 23 15 - 37 U/L    Alk. phosphatase 158 (H) 50 - 136 U/L    Protein, total 6.2 (L) 6.3 - 8.2 g/dL    Albumin 2.3 (L) 3.5 - 5.0 g/dL    Globulin 3.9 (H) 2.3 - 3.5 g/dL    A-G Ratio 0.6 (L) 1.2 - 3.5     CBC W/O DIFF    Collection Time: 09/28/21  3:51 AM   Result Value Ref Range    WBC 9.6 4.3 - 11.1 K/uL    RBC 4.34 4.23 - 5.6 M/uL    HGB 13.2 (L) 13.6 - 17.2 g/dL    HCT 38.4 (L) 41.1 - 50.3 %    MCV 88.5 79.6 - 97.8 FL    MCH 30.4 26.1 - 32.9 PG    MCHC 34.4 31.4 - 35.0 g/dL    RDW 11.9 11.9 - 14.6 %    PLATELET 592 858 - 624 K/uL    MPV 9.8 9.4 - 12.3 FL    ABSOLUTE NRBC 0.00 0.0 - 0.2 K/uL       All Micro Results     Procedure Component Value Units Date/Time    CULTURE, BLOOD [[id number]] Collected: 09/18/21 0638    Order Status: Completed Specimen: Blood Updated: 09/23/21 0711     Special Requests: --        LEFT  Antecubital       Culture result: NO GROWTH 5 DAYS       CULTURE, BLOOD [[id number]] Collected: 09/18/21 0649    Order Status: Completed Specimen: Blood Updated: 09/23/21 0711     Special Requests: --        RIGHT  Antecubital       Culture result: NO GROWTH 5 DAYS             Other Studies:  No results found.     Current Meds:  Current Facility-Administered Medications   Medication Dose Route Frequency    thiamine HCL (B-1) tablet 100 mg  100 mg Oral DAILY    hydrOXYzine pamoate (VISTARIL) capsule 25 mg  25 mg Oral TID    tiotropium bromide (SPIRIVA RESPIMAT) 2.5 mcg /actuation  2 Puff Inhalation DAILY    cholecalciferol (VITAMIN D3) (1000 Units /25 mcg) tablet 2,000 Units  2,000 Units Oral DAILY    ascorbic acid (vitamin C) (VITAMIN C) tablet 1,000 mg  1,000 mg Oral DAILY    sodium chloride (OCEAN) 0.65 % nasal squeeze bottle 2 Spray  2 Spray Both Nostrils TID    traZODone (DESYREL) tablet 200 mg  200 mg Oral QHS    guaiFENesin ER (MUCINEX) tablet 1,200 mg  1,200 mg Oral Q12H    budesonide-formoteroL (SYMBICORT) 160-4.5 mcg/actuation HFA inhaler 2 Puff  2 Puff Inhalation BID RT    pantoprazole (PROTONIX) tablet 40 mg  40 mg Oral ACB    baricitinib (OLUMIANT) tablet 4 mg  4 mg Oral DAILY    fluticasone propionate (FLONASE) 50 mcg/actuation nasal spray 2 Spray  2 Spray Both Nostrils BID    guaiFENesin-codeine (ROBITUSSIN AC) 100-10 mg/5 mL solution 10 mL  10 mL Oral Q4H PRN    sodium chloride (NS) flush 5-40 mL  5-40 mL IntraVENous Q8H    sodium chloride (NS) flush 5-40 mL  5-40 mL IntraVENous PRN    acetaminophen (TYLENOL) tablet 650 mg  650 mg Oral Q6H PRN    Or    acetaminophen (TYLENOL) suppository 650 mg  650 mg Rectal Q6H PRN    polyethylene glycol (MIRALAX) packet 17 g  17 g Oral DAILY PRN    promethazine (PHENERGAN) tablet 12.5 mg  12.5 mg Oral Q6H PRN    Or    ondansetron (ZOFRAN) injection 4 mg  4 mg IntraVENous Q6H PRN    enoxaparin (LOVENOX) injection 40 mg  40 mg SubCUTAneous DAILY       Signed:  Magy Frias DO    Part of this note may have been written by using a voice dictation software. The note has been proof read but may still contain some grammatical/other typographical errors.

## 2021-09-28 NOTE — PROGRESS NOTES
ACUTE PHYSICAL THERAPY GOALS:  (Developed with and agreed upon by patient and/or caregiver.)  (1.)Mr. Liu will move from supine to sit and sit to supine , scoot up and down and roll side to side in bed with INDEPENDENT within 5 treatment day(s) with functional O2 sats. (2.)Mr. Liu will transfer from bed to chair and chair to bed with INDEPENDENT using the least restrictive device within 5 treatment day(s) with functional O2 sats  (3.)Mr. Liu will ambulate with INDEPENDENT for >150 feet with the least restrictive device within 5 treatment day(s) with functional O2 sats. PHYSICAL THERAPY: Daily Note and PM Treatment Day # 2    Surya Calixto is a 64 y.o. male   PRIMARY DIAGNOSIS: Acute hypoxemic respiratory failure due to COVID-19 [facility])  [facility]ute hypox[facility] respiratory [facility] COVID-19 ([facility] [U07.1, J96.01]         ASSESSMENT:     REHAB RECOMMENDATIONS: CURRENT LEVEL OF FUNCTION:  (Most Recently Demonstrated)   Recommendation to date pending progress:  [facility] [place] [address] Therapy  Equipment:    To Be Determined Bed Mobility:   Independent  Sit to Stand:   Supervision  Transfers:   Standby Assistance  Gait/Mobility:   Contact Guard Assistance-stand by assistance     ASSESSMENT:  Mr. Trena Lynn demonstrates improved mobility and activity tolerance today compared to prior session; now on O2 via hi flow NC. Performs mobility in room with CGA-SBA and cues for activity pacing, pursed lip breathing. SpO2 maintains 90% or above during activity. Pt reviewed LE exercises and performed standing activities with safety cues. [facility] pending progress.       SUBJECTIVE:   Mr. Trena Lynn states, \"I will try\"    SOCIAL HISTORY/ LIVING ENVIRONMENT:   Home Environment: Private residence  # Steps to Enter: 3  One/Two Story Residence: One story  Living Alone: No  Support Systems: Spouse/Significant Other  OBJECTIVE:     PAIN: VITAL SIGNS: LINES/DRAINS:   Pre Treatment: Pain Screen  Pain Scale 1: Numeric (0 - 10)  Pain Intensity 1: 0  Post Treatment: 0/10 Vital Signs  O2 Device: Hi flow nasal cannula  O2 Flow Rate (L/min): 7 l/min Continuous Pulse Oximetry  O2 Device: Hi flow nasal cannula     MOBILITY: I Mod I S SBA CGA Min Mod Max Total  NT x2 Comments:   Bed Mobility    Rolling [x] [] [] [] [] [] [] [] [] [] []    Supine to Sit [x] [] [] [] [] [] [] [] [] [] []    Scooting [x] [] [] [] [] [] [] [] [] [] []    Sit to Supine [] [] [] [] [] [] [] [] [] [] []    Transfers    Sit to Stand [] [] [] [x] [] [] [] [] [] [] []    Bed to Chair [] [] [] [x] [x] [] [] [] [] [] []    Stand to Sit [] [] [] [x] [] [] [] [] [] [] []    I=Independent, Mod I=Modified Independent, S=Supervision, SBA=Standby Assistance, CGA=Contact Guard Assistance,   Min=Minimal Assistance, Mod=Moderate Assistance, Max=Maximal Assistance, Total=Total Assistance, NT=Not Tested    BALANCE: Good Fair+ Fair Fair- Poor NT Comments   Sitting Static [x] [] [] [] [] []    Sitting Dynamic [x] [] [] [] [] []              Standing Static [] [x] [] [] [] []    Standing Dynamic [] [x] [] [] [] []      GAIT: I Mod I S SBA CGA Min Mod Max Total  NT x2 Comments:   Level of Assistance [] [] [] [x] [x] [] [] [] [] [] []    Distance 60 ft, 30 ft    DME None    Gait Quality Narrow LAURY, slightly unsteady at times    Weightbearing  Status N/A     I=Independent, Mod I=Modified Independent, S=Supervision, SBA=Standby Assistance, CGA=Contact Guard Assistance,   Min=Minimal Assistance, Mod=Moderate Assistance, Max=Maximal Assistance, Total=Total Assistance, NT=Not Tested    PLAN:   FREQUENCY/DURATION: PT Plan of Care: 3 times/week for duration of hospital stay or until stated goals are met, whichever comes first.  TREATMENT:     TREATMENT:   ($$ Therapeutic Activity: 8-22 mins    )  Therapeutic Activity (20 Minutes):  Therapeutic activity included Rolling, Supine to Sit, Scooting, Transfer Training, Ambulation on level ground, Sitting balance  and Standing balance to improve functional Mobility, Strength, Activity tolerance and balance.     TREATMENT GRID:  N/A    AFTER TREATMENT POSITION/PRECAUTIONS:  Chair, Needs within reach and RN notified    INTERDISCIPLINARY COLLABORATION:  RN/PCT and PT/PTA    TOTAL TREATMENT DURATION:  PT Patient Time In/Time Out  Time In: 1410  Time Out: [address], DPT

## 2021-09-28 NOTE — PROGRESS NOTES
Comprehensive Nutrition Assessment    Type and Reason for Visit: Initial, RD nutrition re-screen/LOS    Nutrition Recommendations/Plan:    Continue current diet.  Start ensure enlive TID with meals - provides 350 kcal/20 gm PRO per bottle. Malnutrition Assessment:  Malnutrition Status: At risk for malnutrition (specify) (varying PO intake w/ prolonged hospital admission, covid 19)    Nutrition Assessment:   Nutrition History: Pt reports that he lost his sense of taste and smell ~1 week PTA. Pt states that he was not eating well during that time. Pt denies any weight loss/gain that he is aware of. Pt states that he typically grazes throughout the day (usually not eating three meals/day). Nutrition Background: Pt admitted with hypoxia. Covid 19 + PTA. PMH notable for former smoker,  Daily Update:  Spoke with pt via phone. Pt endorses decline in PO intake PTA and at the beginning of admission, however, states that he has eaten ~% of meals over the past couple of days. Pt states that he would typically like having snacks throughout the day. Nutrition Related Findings:   NFPE deferred d/t isolation precautions. Current Nutrition Therapies:  ADULT DIET Regular    Current Intake:   Average Meal Intake:  (varying PO intakes; %) Average Supplement Intake: None ordered      Anthropometric Measures:  Height: 6' 1\" (185.4 cm)  Current Body Wt: 81.2 kg (179 lb 0.2 oz) (9/27), Weight source: Bed scale  BMI: 23.6, Normal weight (BMI 18.5-24. 9)  Admission Body Weight: 184 lb 15.5 oz (9/18, wt source not specified)  Ideal Body Weight (lbs) (Calculated): 184 lbs (84 kg), 97.3 %  Usual Body Wt: 83.9 kg (185 lb) (per pt), Percent weight change: -3.2          Edema: No data recorded   Estimated Daily Nutrient Needs:  Energy (kcal/day): 1782-7573 (Kcal/kg (25-30), Weight Used: Current (81.2 kg))  Protein (g/day): 102-122 (20% kcal) Weight Used: (Current)  Fluid (ml/day): 6078-2433  (1 ml/kcal (or per MD))    Nutrition Diagnosis:   · Predicted inadequate energy intake related to  (increased nutrient needs; current medical condition) as evidenced by  (kcal/protein needs exceeding avg regular diet, varying PO)    Nutrition Interventions:   Food and/or Nutrient Delivery: Continue current diet, Start oral nutrition supplement     Coordination of Nutrition Care: Continue to monitor while inpatient    Goals: Active Goal: Meet >75% of estimated kcal/protein needs at time of nutrition follow up. Nutrition Monitoring and Evaluation:      Food/Nutrient Intake Outcomes: Food and nutrient intake, Supplement intake  Physical Signs/Symptoms Outcomes: Biochemical data, GI status, Hemodynamic status, Weight    Discharge Planning:     Too soon to determine    Debra Fried [address], [place], LD, [address]    Disaster Mode Active

## 2021-09-29 LAB
ALBUMIN SERPL-MCNC: 2.3 G/DL (ref 3.5–5)
ALBUMIN/GLOB SERPL: 0.6 {RATIO} (ref 1.2–3.5)
ALP SERPL-CCNC: 145 U/L (ref 50–136)
ALT SERPL-CCNC: 60 U/L (ref 12–65)
ANION GAP SERPL CALC-SCNC: 6 MMOL/L (ref 7–16)
AST SERPL-CCNC: 19 U/L (ref 15–37)
BILIRUB SERPL-MCNC: 0.3 MG/DL (ref 0.2–1.1)
BUN SERPL-MCNC: 20 MG/DL (ref 6–23)
CALCIUM SERPL-MCNC: 8.6 MG/DL (ref 8.3–10.4)
CHLORIDE SERPL-SCNC: 103 MMOL/L (ref 98–107)
CO2 SERPL-SCNC: 29 MMOL/L (ref 21–32)
CREAT SERPL-MCNC: 0.92 MG/DL (ref 0.8–1.5)
ERYTHROCYTE [DISTWIDTH] IN BLOOD BY AUTOMATED COUNT: 12.2 % (ref 11.9–14.6)
GLOBULIN SER CALC-MCNC: 3.6 G/DL (ref 2.3–3.5)
GLUCOSE SERPL-MCNC: 82 MG/DL (ref 65–100)
HCT VFR BLD AUTO: 39.2 % (ref 41.1–50.3)
HGB BLD-MCNC: 13.4 G/DL (ref 13.6–17.2)
MCH RBC QN AUTO: 30.2 PG (ref 26.1–32.9)
MCHC RBC AUTO-ENTMCNC: 34.2 G/DL (ref 31.4–35)
MCV RBC AUTO: 88.5 FL (ref 79.6–97.8)
NRBC # BLD: 0 K/UL (ref 0–0.2)
PLATELET # BLD AUTO: 394 K/UL (ref 150–450)
PMV BLD AUTO: 9.7 FL (ref 9.4–12.3)
POTASSIUM SERPL-SCNC: 4.3 MMOL/L (ref 3.5–5.1)
PROT SERPL-MCNC: 5.9 G/DL (ref 6.3–8.2)
RBC # BLD AUTO: 4.43 M/UL (ref 4.23–5.6)
SODIUM SERPL-SCNC: 138 MMOL/L (ref 138–145)
WBC # BLD AUTO: 8.4 K/UL (ref 4.3–11.1)

## 2021-09-29 PROCEDURE — 74011250637 HC RX REV CODE- 250/637: Performed by: FAMILY MEDICINE

## 2021-09-29 PROCEDURE — 94640 AIRWAY INHALATION TREATMENT: CPT

## 2021-09-29 PROCEDURE — 85027 COMPLETE CBC AUTOMATED: CPT

## 2021-09-29 PROCEDURE — 94762 N-INVAS EAR/PLS OXIMTRY CONT: CPT

## 2021-09-29 PROCEDURE — 74011250637 HC RX REV CODE- 250/637: Performed by: PHYSICIAN ASSISTANT

## 2021-09-29 PROCEDURE — 74011250636 HC RX REV CODE- 250/636: Performed by: INTERNAL MEDICINE

## 2021-09-29 PROCEDURE — 65270000029 HC RM PRIVATE

## 2021-09-29 PROCEDURE — 77010033678 HC OXYGEN DAILY

## 2021-09-29 PROCEDURE — 97535 SELF CARE MNGMENT TRAINING: CPT

## 2021-09-29 PROCEDURE — 36415 COLL VENOUS BLD VENIPUNCTURE: CPT

## 2021-09-29 PROCEDURE — 80053 COMPREHEN METABOLIC PANEL: CPT

## 2021-09-29 PROCEDURE — 97530 THERAPEUTIC ACTIVITIES: CPT

## 2021-09-29 RX ADMIN — Medication 100 MG: at 08:00

## 2021-09-29 RX ADMIN — GUAIFENESIN 1200 MG: 600 TABLET ORAL at 08:01

## 2021-09-29 RX ADMIN — Medication 1000 MG: at 08:01

## 2021-09-29 RX ADMIN — Medication 10 ML: at 22:08

## 2021-09-29 RX ADMIN — Medication 5 ML: at 05:11

## 2021-09-29 RX ADMIN — Medication 10 ML: at 13:37

## 2021-09-29 RX ADMIN — BARICITINIB 4 MG: 2 TABLET, FILM COATED ORAL at 08:00

## 2021-09-29 RX ADMIN — VITAMIN D, TAB 1000IU (100/BT) 2000 UNITS: 25 TAB at 08:01

## 2021-09-29 RX ADMIN — FLUTICASONE PROPIONATE 2 SPRAY: 50 SPRAY, METERED NASAL at 09:00

## 2021-09-29 RX ADMIN — TRAZODONE HYDROCHLORIDE 200 MG: 50 TABLET ORAL at 21:30

## 2021-09-29 RX ADMIN — ENOXAPARIN SODIUM 40 MG: 40 INJECTION SUBCUTANEOUS at 08:00

## 2021-09-29 RX ADMIN — BUDESONIDE AND FORMOTEROL FUMARATE DIHYDRATE 2 PUFF: 160; 4.5 AEROSOL RESPIRATORY (INHALATION) at 20:00

## 2021-09-29 RX ADMIN — GUAIFENESIN 1200 MG: 600 TABLET ORAL at 21:30

## 2021-09-29 RX ADMIN — BUDESONIDE AND FORMOTEROL FUMARATE DIHYDRATE 2 PUFF: 160; 4.5 AEROSOL RESPIRATORY (INHALATION) at 07:48

## 2021-09-29 RX ADMIN — SALINE NASAL SPRAY 2 SPRAY: 1.5 SOLUTION NASAL at 09:00

## 2021-09-29 RX ADMIN — HYDROXYZINE PAMOATE 25 MG: 25 CAPSULE ORAL at 21:30

## 2021-09-29 RX ADMIN — HYDROXYZINE PAMOATE 25 MG: 25 CAPSULE ORAL at 08:00

## 2021-09-29 RX ADMIN — PANTOPRAZOLE SODIUM 40 MG: 40 TABLET, DELAYED RELEASE ORAL at 05:32

## 2021-09-29 RX ADMIN — SALINE NASAL SPRAY 2 SPRAY: 1.5 SOLUTION NASAL at 16:00

## 2021-09-29 RX ADMIN — SALINE NASAL SPRAY 2 SPRAY: 1.5 SOLUTION NASAL at 22:00

## 2021-09-29 RX ADMIN — HYDROXYZINE PAMOATE 25 MG: 25 CAPSULE ORAL at 15:27

## 2021-09-29 RX ADMIN — FLUTICASONE PROPIONATE 2 SPRAY: 50 SPRAY, METERED NASAL at 21:00

## 2021-09-29 NOTE — PROGRESS NOTES
ACUTE PHYSICAL THERAPY GOALS:  (Developed with and agreed upon by patient and/or caregiver.)  (1.)Mr. Liu will move from supine to sit and sit to supine , scoot up and down and roll side to side in bed with INDEPENDENT within 5 treatment day(s) with functional O2 sats. (2.)Mr. Liu will transfer from bed to chair and chair to bed with INDEPENDENT using the least restrictive device within 5 treatment day(s) with functional O2 sats  (3.)Mr. Liu will ambulate with INDEPENDENT for >150 feet with the least restrictive device within 5 treatment day(s) with functional O2 sats. PHYSICAL THERAPY: Daily Note and AM Treatment Day # 3    Qian Marcus is a 64 y.o. male   PRIMARY DIAGNOSIS: Acute hypoxemic respiratory failure due to COVID-19 [facility])  Acute hypoxemic respiratory failure [facility] to COVID-19 [facility] [U07.1, J96.01]         ASSESSMENT:     REHAB RECOMMENDATIONS: CURRENT LEVEL OF FUNCTION:  (Most Recently Demonstrated)   Recommendation to date pending progress:  [address] [place] [address] Therapy  Equipment:    To Be Determined Bed Mobility:   Independent  Sit to Stand:   Contact Guard Assistance  Transfers:   Contact Guard Assistance  Gait/Mobility:   Contact Guard Assistance     ASSESSMENT:  Mr. Felipe Carlson is making steady progress with therapy, demonstrates improved activity tolerance and overall gait distance compared to prior sessions but does need increased encouragement today to participate. Practiced ambulation in room x 3 trials with close CGA and unsteady gait at times, c/o left hip pain/soreness and had two losses of balance needing assistance to correct. Verbal cues for activity pacing and pursed lip breathing. Performs standing activities with CGA to address balance, activity tolerance. SpO2 on 7L during activity drops to 87%, maintains 91-92% on 8L. Needs continued therapy due to weakness, unsteady gait. Hopefully can dc home with family and [facility] pending progress.       SUBJECTIVE:   Mr. Felipe Carlson states, \"I just don't want to\"    SOCIAL HISTORY/ LIVING ENVIRONMENT: independent. Works.    Home Environment: Private residence  # Steps to Enter: 3  One/Two Story Residence: One story  Living Alone: No  Support Systems: Spouse/Significant Other  OBJECTIVE:     PAIN: VITAL SIGNS: LINES/DRAINS:   Pre Treatment: Pain Screen  Pain Scale 1: Numeric (0 - 10)  Pain Intensity 1: 0  Post Treatment: 0/10 Vital Signs  O2 Device: Hi flow nasal cannula  O2 Flow Rate (L/min): 8 l/min Continuous Pulse Oximetry  O2 Device: Hi flow nasal cannula     MOBILITY: I Mod I S SBA CGA Min Mod Max Total  NT x2 Comments:   Bed Mobility    Rolling [] [] [] [x] [] [] [] [] [] [] []    Supine to Sit [] [] [] [x] [] [] [] [] [] [] []    Scooting [] [] [] [x] [] [] [] [] [] [] []    Sit to Supine [] [] [] [] [] [] [] [] [] [] []    Transfers    Sit to Stand [] [] [] [] [x] [] [] [] [] [] []    Bed to Chair [] [] [] [] [x] [] [] [] [] [] []    Stand to Sit [] [] [] [] [x] [] [] [] [] [] []    I=Independent, Mod I=Modified Independent, S=Supervision, SBA=Standby Assistance, CGA=Contact Guard Assistance,   Min=Minimal Assistance, Mod=Moderate Assistance, Max=Maximal Assistance, Total=Total Assistance, NT=Not Tested    BALANCE: Good Fair+ Fair Fair- Poor NT Comments   Sitting Static [x] [] [] [] [] []    Sitting Dynamic [x] [] [] [] [] []              Standing Static [] [] [x] [] [] []    Standing Dynamic [] [x] [x] [] [] []      GAIT: I Mod I S SBA CGA Min Mod Max Total  NT x2 Comments:   Level of Assistance [] [] [] [] [x] [x] [] [] [] [] []    Distance 60 ft x4    DME None    Gait Quality Unsteady at times, c/o left hip pain    Weightbearing  Status N/A     I=Independent, Mod I=Modified Independent, S=Supervision, SBA=Standby Assistance, CGA=Contact Guard Assistance,   Min=Minimal Assistance, Mod=Moderate Assistance, Max=Maximal Assistance, Total=Total Assistance, NT=Not Tested    PLAN:   FREQUENCY/DURATION: PT Plan of Care: 3 times/week for duration of hospital stay or until stated goals are met, whichever comes first.  TREATMENT:     TREATMENT:   ($$ Therapeutic Activity: 23-37 mins    )  Therapeutic Activity (23 Minutes): Therapeutic activity included Rolling, Supine to Sit, Scooting, Transfer Training, Ambulation on level ground, Sitting balance , Standing balance and seated exercise review, education on activity pacing and pursed lip breathing to improve functional Mobility, Strength, Activity tolerance and balance.     TREATMENT GRID:  N/A    AFTER TREATMENT POSITION/PRECAUTIONS:  Bed, Needs within reach and RN notified    INTERDISCIPLINARY COLLABORATION:  RN/PCT and PT/PTA    TOTAL TREATMENT DURATION:  PT Patient Time In/Time Out  Time In: 0845  Time Out: [address]vin [address], DPT

## 2021-09-29 NOTE — PROGRESS NOTES
Hospitalist Progress Note   Admit Date:  2021  5:52 AM   Name:  Mare Suarez   Age:  64 y.o. Sex:  male  :  1965   MRN:  [id number]   Room:  [facility]    Presenting Complaint: Shortness of Breath    Reason(s) for Admission: Acute hypoxemic respiratory failure due to COVID-19 ([facility] [U07.1, J96.01]     Hospital Course & Interval History:   Za Lazcano a 64 y. o. male with PMHx Former smoker (quit 1 year ago), Holger Osman presented to ED with worsening SOB and fatigue. Reports symptoms started around  and was tested positive for COVID19 on .  His symptoms were mild initially and therefore he did not seek any medical evaluation. [facility] was seen on  in the [address] [place] [address] ED with worsening lethargy, shortness of breath.  He was saturating well on room air and therefore was discharged home with Decadron.  He reports that he took all but 2 pills of Decadron since then but his symptoms have worsened. [facility] has been checking his oxygen saturation at home and has been consistently staying in mid 80s past couple days. Joe Vaughn reporting poor appetite, loss of taste and smell, nausea, dry cough, fever and chills.       In the ED, patient was found to be 84% on room air.  Saturation improved with O2 supplement.  Currently on 15 L high flow nasal cannula plus nonrebreather to maintain saturation of 94%.  Laboratory work up is remarkable for WBC 11.2, BUN 16, creatinine 1.03,  proCalcitonin of 0.14, CRP of 6.6 x-ray with diffuse bilateral lung infiltrates.  EKG showed sinus rhythm with nonspecific T and ST wave abnormalities.        SARS-CoV-2 PCR ordered 2021 resulted 2020 POSITIVE     Went to bathroom to clean up without assistance and found with spo2 77%.  Placed on 15L HFNC bc he was insistent on taking a shower and when placed back on optiflow 45/60% (previous settings), he was satting 80% and fio2 increased to 100%     : airvo to 7L     Subjective (21):  No acute events ON. Sleeping on back with no increased WOB. No complaints new complaints today. Did well with PT/OT today. spo2 dropped to 87% with PT activity and maintained with 8L then later satting 87-97% on 7L HFNC with OT      Review of Systems   Constitutional: Positive for malaise/fatigue. HENT: Positive for congestion. Respiratory: Positive for cough and shortness of breath. Neurological: Positive for weakness. Psychiatric/Behavioral: The patient has insomnia. Assessment & Plan:     Principal Problem:    Acute hypoxemic respiratory failure due to COVID-19 // Sepsis 2/2 COVID PNA    COVID+9/6 (PCR+9/18)   Improving. 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 trazodone nightly     GERD - PPI     Transaminitis - 2/2 covid. Cont to monitor     Thrombocytosis - reactive to infection     Former smoker - quit 1 year ago.        Dispo/Discharge Planning:      Home w/ HHwhen stable     Diet:  ADULT DIET Regular  ADULT ORAL NUTRITION SUPPLEMENT Breakfast, Lunch, Dinner; Standard High Calorie/High Protein  DVT PPx: lovenox   Code status: Full Code    Hospital Problems as of 9/29/2021 Date Reviewed: 12/23/2020        Codes Class Noted - Resolved POA    Transaminitis ICD-10-CM: R74.01  ICD-9-CM: 790.4  9/23/2021 - Present Yes        Hypoalbuminemia ICD-10-CM: E88.09  ICD-9-CM: 273.8  9/23/2021 - Present Clinically Undetermined        Thrombocytosis ICD-10-CM: D47.3  ICD-9-CM: 238.71  9/23/2021 - Present Yes        Pneumonia due to COVID-19 virus ICD-10-CM: U07.1, J12.82  ICD-9-CM: 480.8, 079.89  9/23/2021 - Present Yes        Sleep disorder due to a general medical condition, insomnia type ICD-10-CM: G47.01  ICD-9-CM: 327.01  9/22/2021 - Present Yes        * (Principal) Acute hypoxemic respiratory failure due to COVID-19 [facility]) ICD-10-CM: U07.1, J96.01  ICD-9-CM: 518.81, 079.89, 799.02  9/18/2021 - Present Yes              Objective:     Patient Vitals for the past 24 hrs:   Temp Pulse Resp BP SpO2   09/29/21 1059     93 %   09/29/21 1047 97.5 °F (36.4 °C) 80 25 (!) 103/55 95 %   09/29/21 0748     92 %   09/29/21 0734 98 °F (36.7 °C) 68 20 110/74 94 %   09/29/21 0314 98.1 °F (36.7 °C) 66 20 98/66 96 %   09/28/21 2355 97.8 °F (36.6 °C) 75 20 103/70 94 %   09/28/21 2026     90 %   09/28/21 1924 97.8 °F (36.6 °C) 67 18 113/75 94 %   09/28/21 1735 97.5 °F (36.4 °C) 60 22 120/78 98 %     Oxygen Therapy  O2 Sat (%): 93 % (09/29/21 1059)  Pulse via Oximetry: 73 beats per minute (09/29/21 0748)  O2 Device: Hi flow nasal cannula (09/29/21 1059)  Skin Assessment: Clean, dry, & intact (09/29/21 4214)  Skin Protection for O2 Device: No (09/26/21 2244)  O2 Flow Rate (L/min): 7 l/min (09/29/21 1059)  O2 Temperature: 87.8 °F (31 °C) (09/28/21 0743)  FIO2 (%): 55 % (09/28/21 0743)    Estimated body mass index is 23.62 kg/m² as calculated from the following:    Height as of this encounter: 6' 1\" (1.854 m). Weight as of this encounter: 81.2 kg (179 lb 0.2 oz). Intake/Output Summary (Last 24 hours) at 9/29/2021 1410  Last data filed at 9/29/2021 1339  Gross per 24 hour   Intake 960 ml   Output 3150 ml   Net -2190 ml         Physical Exam:   Physical Exam  Vitals and nursing note reviewed. Constitutional:       Appearance: Normal appearance. Cardiovascular:      Rate and Rhythm: Normal rate and regular rhythm. Pulmonary:      Effort: Pulmonary effort is normal. No respiratory distress. Breath sounds: Normal breath sounds. Musculoskeletal:      Right lower leg: No edema. Left lower leg: No edema. Neurological:      General: No focal deficit present. Mental Status: He is alert.        I have reviewed ordered lab tests and independently visualized imaging below:    Last 24hr Labs:  Recent Results (from the past 24 hour(s))   CBC W/O DIFF    Collection Time: 09/29/21  5:39 AM   Result Value Ref Range    WBC 8.4 4.3 - 11.1 K/uL    RBC 4.43 4.23 - 5.6 M/uL    HGB 13.4 (L) 13.6 - 17.2 g/dL    HCT 39.2 (L) 41.1 - 50.3 %    MCV 88.5 79.6 - 97.8 FL    MCH 30.2 26.1 - 32.9 PG    MCHC 34.2 31.4 - 35.0 g/dL    RDW 12.2 11.9 - 14.6 %    PLATELET 122 883 - 883 K/uL    MPV 9.7 9.4 - 12.3 FL    ABSOLUTE NRBC 0.00 0.0 - 0.2 K/uL   METABOLIC PANEL, COMPREHENSIVE    Collection Time: 09/29/21  5:39 AM   Result Value Ref Range    Sodium 138 138 - 145 mmol/L    Potassium 4.3 3.5 - 5.1 mmol/L    Chloride 103 98 - 107 mmol/L    CO2 29 21 - 32 mmol/L    Anion gap 6 (L) 7 - 16 mmol/L    Glucose 82 65 - 100 mg/dL    BUN 20 6 - 23 MG/DL    Creatinine 0.92 0.8 - 1.5 MG/DL    GFR est AA >60 >60 ml/min/1.73m2    GFR est non-AA >60 >60 ml/min/1.73m2    Calcium 8.6 8.3 - 10.4 MG/DL    Bilirubin, total 0.3 0.2 - 1.1 MG/DL    ALT (SGPT) 60 12 - 65 U/L    AST (SGOT) 19 15 - 37 U/L    Alk. phosphatase 145 (H) 50 - 136 U/L    Protein, total 5.9 (L) 6.3 - 8.2 g/dL    Albumin 2.3 (L) 3.5 - 5.0 g/dL    Globulin 3.6 (H) 2.3 - 3.5 g/dL    A-G Ratio 0.6 (L) 1.2 - 3.5         All Micro Results     Procedure Component Value Units Date/Time    CULTURE, BLOOD [[id number]] Collected: 09/18/21 0636    Order Status: Completed Specimen: Blood Updated: 09/23/21 07     Special Requests: --        LEFT  Antecubital       Culture result: NO GROWTH 5 DAYS       CULTURE, BLOOD [[id number]] Collected: 09/18/21 0649    Order Status: Completed Specimen: Blood Updated: 09/23/21 0711     Special Requests: --        RIGHT  Antecubital       Culture result: NO GROWTH 5 DAYS             Other Studies:  No results found.     Current Meds:  Current Facility-Administered Medications   Medication Dose Route Frequency    thiamine HCL (B-1) tablet 100 mg  100 mg Oral DAILY    hydrOXYzine pamoate (VISTARIL) capsule 25 mg  25 mg Oral TID    tiotropium bromide (SPIRIVA RESPIMAT) 2.5 mcg /actuation  2 Puff Inhalation DAILY    cholecalciferol (VITAMIN D3) (1000 Units /25 mcg) tablet 2,000 Units  2,000 Units Oral DAILY    ascorbic acid (vitamin C) (VITAMIN C) tablet 1,000 mg  1,000 mg Oral DAILY    sodium chloride (OCEAN) 0.65 % nasal squeeze bottle 2 Spray  2 Spray Both Nostrils TID    traZODone (DESYREL) tablet 200 mg  200 mg Oral QHS    guaiFENesin ER (MUCINEX) tablet 1,200 mg  1,200 mg Oral Q12H    budesonide-formoteroL (SYMBICORT) 160-4.5 mcg/actuation HFA inhaler 2 Puff  2 Puff Inhalation BID RT    pantoprazole (PROTONIX) tablet 40 mg  40 mg Oral ACB    baricitinib (OLUMIANT) tablet 4 mg  4 mg Oral DAILY    fluticasone propionate (FLONASE) 50 mcg/actuation nasal spray 2 Spray  2 Spray Both Nostrils BID    guaiFENesin-codeine (ROBITUSSIN AC) 100-10 mg/5 mL solution 10 mL  10 mL Oral Q4H PRN    sodium chloride (NS) flush 5-40 mL  5-40 mL IntraVENous Q8H    sodium chloride (NS) flush 5-40 mL  5-40 mL IntraVENous PRN    acetaminophen (TYLENOL) tablet 650 mg  650 mg Oral Q6H PRN    Or    acetaminophen (TYLENOL) suppository 650 mg  650 mg Rectal Q6H PRN    polyethylene glycol (MIRALAX) packet 17 g  17 g Oral DAILY PRN    promethazine (PHENERGAN) tablet 12.5 mg  12.5 mg Oral Q6H PRN    Or    ondansetron (ZOFRAN) injection 4 mg  4 mg IntraVENous Q6H PRN    enoxaparin (LOVENOX) injection 40 mg  40 mg SubCUTAneous DAILY       Signed:  Gabriel Kovacs DO    Part of this note may have been written by using a voice dictation software. The note has been proof read but may still contain some grammatical/other typographical errors.

## 2021-09-29 NOTE — PROGRESS NOTES
ACUTE OT GOALS:  (Developed with and agreed upon by patient and/or caregiver.)  1) Patient will complete lower body bathing and dressing with MOD I and adaptive equipment as needed. 2) Patient will complete toileting with MOD I.   3) Patient will complete functional transfers INDEPENDENTLY. 4) Patient will tolerate at least 25 minutes of OT activity with AS NEEDED rest breaks while maintaining O2 sats >90%. 5) Patient will verbalize at least 3 energy conservation technique to utilize during ADL/IADL. Timeframe: 7 visits     OCCUPATIONAL THERAPY: Daily Note OT Treatment Day # 3    Diego Yuan is a 64 y.o. male   PRIMARY DIAGNOSIS: Acute hypoxemic respiratory failure due to COVID-19 ([facility]  Acute hypoxemic respiratory failure due to COVID-19 ([facility]) [U07.1, J96.01]       Payor: [employer] / Plan: [employer] HMO/CHOICE PLUS/POS / Product Type: HMO /   ASSESSMENT:     REHAB RECOMMENDATIONS: CURRENT LEVEL OF FUNCTION:  (Most Recently Demonstrated)   Recommendation to date pending progress:  [address] [place] [address] Therapy  Equipment:    Shower Chair Bathing:   Not tested  Dressing:   Standby Assistance  Feeding/Grooming:   Standby Assistance  Toileting:   Standby Assistance  Functional Mobility:   Contact Guard Assistance     ASSESSMENT:  Mr. Magali Mckeon  is a 61 y/o male presents with acute respiratory failure due to [place] 19. Today pt presents with decreased activity tolerance, balance and strength impacting ADLs. Pt with O2 improvement, now off of Airvo and on 7L hi flow NC with O2 sats 94% at rest. Pt completed functional transfers in prep for ADLs, functional mobility of household distances, UE dressing, toileting and grooming ADLs in grid below. Pt required seated RB in between ADLs today. During RB, therapist reinforced importance of energy conservation, pacing and spreading out ADLs/IADLs while lungs are healing from COVID.  Pt O2 sats ranged 87-97% throughout. Pt is making progress toward goals. Continue POC. SUBJECTIVE:   Mr. Felipe Carlson states, \"I can try brushing my teeth standing up today. \"    SOCIAL HISTORY/LIVING ENVIRONMENT: lives with wife, one level home, no DME in home, tub shower, works as a   Home Environment: Private residence  # Steps to Enter: 3  One/Two Story Residence: One story  Living Alone: No  Support Systems: Spouse/Significant Other    OBJECTIVE:     PAIN: VITAL SIGNS: LINES/DRAINS:   Pre Treatment: Pain Screen  Pain Scale 1: Numeric (0 - 10)  Pain Intensity 1: 0  Post Treatment: 0 Vital Signs  O2 Sat (%): 93 %  O2 Device: Hi flow nasal cannula  O2 Flow Rate (L/min): 7 l/min Continuous Pulse Oximetry  O2 Device: Hi flow nasal cannula     ACTIVITIES OF DAILY LIVING: I Mod I S SBA CGA Min Mod Max Total NT Comments   BASIC ADLs:              Bathing/ Showering [] [] [] [] [] [] [] [] [] [x]    Toileting [] [] [] [x] [] [] [] [] [] [] Standing at toilet   Dressing [] [] [] [x] [] [] [] [] [] [] Donning/doffing gown in standing   Feeding [x] [] [] [] [] [] [] [] [] [] Eating snack in chair   Grooming [] [] [] [x] [] [] [] [] [] [] Brushing teeth and washing hands standing at sink   Personal [facility] [] [] [] [] [] [] [] [] [] [x]    Functional Mobility [] [] [] [] [x] [] [] [] [] [] No AD   I=Independent, Mod I=Modified Independent, S=Supervision, SBA=Standby Assistance, CGA=Contact Guard Assistance,   Min=Minimal Assistance, Mod=Moderate Assistance, Max=Maximal Assistance, Total=Total Assistance, NT=Not Tested    MOBILITY: I Mod I S SBA CGA Min Mod Max Total  NT x2 Comments:   Supine to sit [x] [] [] [] [] [] [] [] [] [] []    Sit to supine [] [] [] [] [] [] [] [] [] [x] []    Sit to stand [] [] [] [x] [] [] [] [] [] [] [] No AD   Bed to chair [] [] [] [] [x] [] [] [] [] [] [] No AD, LLE noted to buckle and CGA provided for balance   I=Independent, Mod I=Modified Independent, S=Supervision, SBA=Standby Assistance, CGA=Contact Guard Assistance,   Min=Minimal Assistance, Mod=Moderate Assistance, Max=Maximal Assistance, Total=Total Assistance, NT=Not Tested    BALANCE: Good Fair+ Fair Fair- Poor NT Comments   Sitting Static [x] [] [] [] [] [] In chair   Sitting Dynamic [x] [] [] [] [] [] Self feeding in chair             Standing Static [] [x] [] [] [] [] SBA at sink   Standing Dynamic [] [] [x] [] [] [] CGA, 1 LOB when standing     PLAN:   FREQUENCY/DURATION: OT Plan of Care: 3 times/week for duration of hospital stay or until stated goals are met, whichever comes first.    TREATMENT:   TREATMENT:   ($$ Self Care/Home Management: 23-37 mins    )  Self Care (29 Minutes): Self care including Toileting, Upper Body Dressing, Self Feeding, Grooming, Energy Conservation Training and functional transfers in prep for ADLs and ambulating household distances to increase independence and decrease level of assistance required.     TREATMENT GRID:  N/A    AFTER TREATMENT POSITION/PRECAUTIONS:  Chair, Needs within reach and RN notified    INTERDISCIPLINARY COLLABORATION:  RN/PCT and OT/HINSON    TOTAL TREATMENT DURATION:  OT Patient Time In/Time Out  Time In: 1059  Time Out: [address], OT

## 2021-09-30 LAB
ALBUMIN SERPL-MCNC: 2.3 G/DL (ref 3.5–5)
ALBUMIN/GLOB SERPL: 0.6 {RATIO} (ref 1.2–3.5)
ALP SERPL-CCNC: 140 U/L (ref 50–136)
ALT SERPL-CCNC: 51 U/L (ref 12–65)
ANION GAP SERPL CALC-SCNC: 8 MMOL/L (ref 7–16)
AST SERPL-CCNC: 17 U/L (ref 15–37)
BILIRUB SERPL-MCNC: 0.3 MG/DL (ref 0.2–1.1)
BUN SERPL-MCNC: 17 MG/DL (ref 6–23)
CALCIUM SERPL-MCNC: 8.8 MG/DL (ref 8.3–10.4)
CHLORIDE SERPL-SCNC: 102 MMOL/L (ref 98–107)
CO2 SERPL-SCNC: 28 MMOL/L (ref 21–32)
CREAT SERPL-MCNC: 0.95 MG/DL (ref 0.8–1.5)
ERYTHROCYTE [DISTWIDTH] IN BLOOD BY AUTOMATED COUNT: 12.3 % (ref 11.9–14.6)
GLOBULIN SER CALC-MCNC: 3.6 G/DL (ref 2.3–3.5)
GLUCOSE SERPL-MCNC: 108 MG/DL (ref 65–100)
HCT VFR BLD AUTO: 38.7 % (ref 41.1–50.3)
HGB BLD-MCNC: 12.8 G/DL (ref 13.6–17.2)
MCH RBC QN AUTO: 29.3 PG (ref 26.1–32.9)
MCHC RBC AUTO-ENTMCNC: 33.1 G/DL (ref 31.4–35)
MCV RBC AUTO: 88.6 FL (ref 79.6–97.8)
NRBC # BLD: 0 K/UL (ref 0–0.2)
PLATELET # BLD AUTO: 366 K/UL (ref 150–450)
PMV BLD AUTO: 9.9 FL (ref 9.4–12.3)
POTASSIUM SERPL-SCNC: 3.6 MMOL/L (ref 3.5–5.1)
PROT SERPL-MCNC: 5.9 G/DL (ref 6.3–8.2)
RBC # BLD AUTO: 4.37 M/UL (ref 4.23–5.6)
SODIUM SERPL-SCNC: 138 MMOL/L (ref 138–145)
WBC # BLD AUTO: 8.4 K/UL (ref 4.3–11.1)

## 2021-09-30 PROCEDURE — 97535 SELF CARE MNGMENT TRAINING: CPT

## 2021-09-30 PROCEDURE — 94762 N-INVAS EAR/PLS OXIMTRY CONT: CPT

## 2021-09-30 PROCEDURE — 74011250637 HC RX REV CODE- 250/637: Performed by: FAMILY MEDICINE

## 2021-09-30 PROCEDURE — 94640 AIRWAY INHALATION TREATMENT: CPT

## 2021-09-30 PROCEDURE — 65270000029 HC RM PRIVATE

## 2021-09-30 PROCEDURE — 77010033678 HC OXYGEN DAILY

## 2021-09-30 PROCEDURE — 97530 THERAPEUTIC ACTIVITIES: CPT

## 2021-09-30 PROCEDURE — 74011250637 HC RX REV CODE- 250/637: Performed by: PHYSICIAN ASSISTANT

## 2021-09-30 PROCEDURE — 80053 COMPREHEN METABOLIC PANEL: CPT

## 2021-09-30 PROCEDURE — 85027 COMPLETE CBC AUTOMATED: CPT

## 2021-09-30 PROCEDURE — 36415 COLL VENOUS BLD VENIPUNCTURE: CPT

## 2021-09-30 PROCEDURE — 74011250636 HC RX REV CODE- 250/636: Performed by: INTERNAL MEDICINE

## 2021-09-30 RX ADMIN — Medication 10 ML: at 13:01

## 2021-09-30 RX ADMIN — ENOXAPARIN SODIUM 40 MG: 40 INJECTION SUBCUTANEOUS at 08:03

## 2021-09-30 RX ADMIN — GUAIFENESIN 1200 MG: 600 TABLET ORAL at 21:22

## 2021-09-30 RX ADMIN — Medication 1000 MG: at 09:00

## 2021-09-30 RX ADMIN — PANTOPRAZOLE SODIUM 40 MG: 40 TABLET, DELAYED RELEASE ORAL at 05:56

## 2021-09-30 RX ADMIN — SALINE NASAL SPRAY 2 SPRAY: 1.5 SOLUTION NASAL at 22:00

## 2021-09-30 RX ADMIN — VITAMIN D, TAB 1000IU (100/BT) 2000 UNITS: 25 TAB at 08:03

## 2021-09-30 RX ADMIN — Medication 100 MG: at 08:03

## 2021-09-30 RX ADMIN — HYDROXYZINE PAMOATE 25 MG: 25 CAPSULE ORAL at 15:39

## 2021-09-30 RX ADMIN — BARICITINIB 4 MG: 2 TABLET, FILM COATED ORAL at 08:03

## 2021-09-30 RX ADMIN — TRAZODONE HYDROCHLORIDE 200 MG: 50 TABLET ORAL at 21:23

## 2021-09-30 RX ADMIN — BUDESONIDE AND FORMOTEROL FUMARATE DIHYDRATE 2 PUFF: 160; 4.5 AEROSOL RESPIRATORY (INHALATION) at 08:00

## 2021-09-30 RX ADMIN — BUDESONIDE AND FORMOTEROL FUMARATE DIHYDRATE 2 PUFF: 160; 4.5 AEROSOL RESPIRATORY (INHALATION) at 21:29

## 2021-09-30 RX ADMIN — FLUTICASONE PROPIONATE 2 SPRAY: 50 SPRAY, METERED NASAL at 21:00

## 2021-09-30 RX ADMIN — HYDROXYZINE PAMOATE 25 MG: 25 CAPSULE ORAL at 21:23

## 2021-09-30 RX ADMIN — TIOTROPIUM BROMIDE INHALATION SPRAY 2 PUFF: 3.12 SPRAY, METERED RESPIRATORY (INHALATION) at 09:00

## 2021-09-30 RX ADMIN — SALINE NASAL SPRAY 2 SPRAY: 1.5 SOLUTION NASAL at 16:00

## 2021-09-30 RX ADMIN — HYDROXYZINE PAMOATE 25 MG: 25 CAPSULE ORAL at 08:03

## 2021-09-30 RX ADMIN — SALINE NASAL SPRAY 2 SPRAY: 1.5 SOLUTION NASAL at 09:00

## 2021-09-30 RX ADMIN — GUAIFENESIN 1200 MG: 600 TABLET ORAL at 08:03

## 2021-09-30 RX ADMIN — FLUTICASONE PROPIONATE 2 SPRAY: 50 SPRAY, METERED NASAL at 09:00

## 2021-09-30 RX ADMIN — Medication 10 ML: at 21:22

## 2021-09-30 RX ADMIN — Medication 10 ML: at 05:43

## 2021-09-30 NOTE — PROGRESS NOTES
ACUTE OT GOALS:  (Developed with and agreed upon by patient and/or caregiver.)  1) Patient will complete lower body bathing and dressing with MOD I and adaptive equipment as needed. 2) Patient will complete toileting with MOD I.   3) Patient will complete functional transfers INDEPENDENTLY. 4) Patient will tolerate at least 25 minutes of OT activity with AS NEEDED rest breaks while maintaining O2 sats >90%. 5) Patient will verbalize at least 3 energy conservation technique to utilize during ADL/IADL. Timeframe: 7 visits     OCCUPATIONAL THERAPY: Daily Note OT Treatment Day # 4    Jessika Tong is a 64 y.o. male   PRIMARY DIAGNOSIS: Acute hypoxemic respiratory failure due to COVID-19 ([facility]  Acute hypoxemic respiratory failure due to COVID-19 ([facility]) [U07.1, J96.01]       Payor: [employer] / Plan: [employer] HMO/CHOICE PLUS/POS / Product Type: HMO /   ASSESSMENT:     REHAB RECOMMENDATIONS: CURRENT LEVEL OF FUNCTION:  (Most Recently Demonstrated)   Recommendation to date pending progress:  [address] [place] [address] Therapy  Equipment:    Shower Chair Bathing:   Not tested  Dressing:   Standby Assistance  Feeding/Grooming:   Standby Assistance  Toileting:   Standby Assistance  Functional Mobility:   Contact Guard Assistance     ASSESSMENT:  Mr. Chon Allison  is a 63 y/o male presents with acute respiratory failure due to [place] 19. Today pt presents with decreased activity tolerance, balance and strength impacting ADLs. Pt resting in supine on 5L hi flow NC with O2 sats 94% at rest. Pt completed functional transfers in prep for ADLs, functional mobility of household distances, LE dressing, toileting and grooming ADLs in grid below. Pt noted to have 1 LOB, therapist corrected, when ambulating in room without AD. Pt stated it was because of his R hip that \"locks up. \" Pt desat to 87% after toileting in standing, cleaning hands and walking back to bedside chair, but was able to reach >90% after seated RB. Pt is making progress toward goals, currently limited by balance and O2 needs. Continue POC. SUBJECTIVE:   Mr. Magali Mckeon states, \"I need to go to the bathroom. \"    SOCIAL HISTORY/LIVING ENVIRONMENT: lives with wife, one level home, no DME in home, tub shower, works as a   Home Environment: Private residence  # Steps to Enter: 3  One/Two Story Residence: One story  Living Alone: No  Support Systems: Spouse/Significant Other    OBJECTIVE:     PAIN: VITAL SIGNS: LINES/DRAINS:   Pre Treatment: Pain Screen  Pain Scale 1: Numeric (0 - 10)  Pain Intensity 1: 0  Post Treatment: 0 Vital Signs  O2 Sat (%): 97 %  O2 Device: Hi flow nasal cannula  O2 Flow Rate (L/min): 5 l/min Continuous Pulse Oximetry  O2 Device: Hi flow nasal cannula     ACTIVITIES OF DAILY LIVING: I Mod I S SBA CGA Min Mod Max Total NT Comments   BASIC ADLs:              Bathing/ Showering [] [] [] [] [] [] [] [] [] [x]    Toileting [] [] [] [x] [] [] [] [] [] [] Standing at toilet   Dressing [] [] [] [x] [] [] [] [] [] [] Donning/doffing underwear standing at toilet   Feeding [] [] [] [] [] [] [] [] [] [x]    Grooming [] [] [] [x] [] [] [] [] [] [] Cleaning hands with hand  in standing   Personal Device Care [] [] [] [] [] [] [] [] [] [x]    Functional Mobility [] [] [] [x] [x] [] [] [] [] [] No AD, 1 LOB   I=Independent, Mod I=Modified Independent, S=Supervision, SBA=Standby Assistance, CGA=Contact Guard Assistance,   Min=Minimal Assistance, Mod=Moderate Assistance, Max=Maximal Assistance, Total=Total Assistance, NT=Not Tested    MOBILITY: I Mod I S SBA CGA Min Mod Max Total  NT x2 Comments:   Supine to sit [x] [] [] [] [] [] [] [] [] [] []    Sit to supine [] [] [] [] [] [] [] [] [] [x] []    Sit to stand [] [] [] [x] [] [] [] [] [] [] [] No AD   Bed to chair [] [] [] [x] [x] [] [] [] [] [] [] No AD, 1 LOB, CGA provided for balance   I=Independent, Mod I=Modified Independent, S=Supervision, SBA=Standby Assistance, CGA=Contact Guard Assistance,   Min=Minimal Assistance, Mod=Moderate Assistance, Max=Maximal Assistance, Total=Total Assistance, NT=Not Tested    BALANCE: Good Fair+ Fair Fair- Poor NT Comments   Sitting Static [x] [] [] [] [] [] In chair   Sitting Dynamic [] [] [] [] [] [x]              Standing Static [] [x] [] [] [] [] SBA at toilet   Standing Dynamic [] [] [x] [] [] [] CGA, 1 LOB when standing     PLAN:   FREQUENCY/DURATION: OT Plan of Care: 3 times/week for duration of hospital stay or until stated goals are met, whichever comes first.    TREATMENT:   TREATMENT:   ($$ Self Care/Home Management: 23-37 mins    )  Self Care (24 Minutes): Self care including Toileting, Lower Body Dressing, Grooming, Energy Conservation Training and functional transfers in prep for ADLs and ambulating household distances to increase independence and decrease level of assistance required.     TREATMENT GRID:  N/A    AFTER TREATMENT POSITION/PRECAUTIONS:  Chair, Needs within reach and RN notified    INTERDISCIPLINARY COLLABORATION:  RN/PCT and OT/HINSON    TOTAL TREATMENT DURATION:  OT Patient Time In/Time Out  Time In: 5660  Time Out: 53 Adalgisa Hager, OT

## 2021-09-30 NOTE — PROGRESS NOTES
CM chart review. Patient will discharge home with [facility] when stable. Referral has also been placed to [facility] to assist patient with establishing new PCP. CM will follow to assist with new needs that may arise.

## 2021-09-30 NOTE — PROGRESS NOTES
[facility] Hospitalist Note     Admit Date:  2021  5:52 AM   Name:  Anna Drew   Age:  64 y.o.  :  1965   MRN:  [id number]   PCP:  None  Treatment Team: Attending Provider: Silvino Marcus MD; Utilization Review: Jaleel Scott; Care Manager: Jake Munoz RN; Physical Therapist: Maryjane Quarles Occupational Therapist: Denny Zheng OT; Charge Nurse: Tiera Barger RN    Hospital course/Subjective:   Jordan Noel a 64 y. o. male with PMHx Former smoker (quit 1 year ago), Yumiko Stable presented to ED with worsening SOB and fatigue. Reports symptoms started around  and was tested positive for COVID19 on .  His symptoms were mild initially and therefore he did not seek any medical evaluation. Lauraine Holter was seen on  in the [facility] ED with worsening lethargy, shortness of breath.  He was saturating well on room air and therefore was discharged home with Decadron.    In the ED, patient was found to be 84% on room air.  Saturation improved with O2 supplement.  Currently on 15 L high flow nasal cannula plus nonrebreather to maintain saturation of 94%. SARS-CoV-2 PCR ordered 2021 resulted 2020 POSITIVE. On  Went to bathroom to clean up without assistance and found with spo2 77%. Placed on 15L HFNC bc he was insistent on taking a shower and when placed back on optiflow 45/60% (previous settings), he was satting 80% and fio2 increased to 100%. On  patient weaned to 7 L high flow nasal cannula.     : Patient is seen at the bedside. Satting 96 to 98% on 7 L HFNC. I weaned to 5 L HFNC. Reports he is feeling better. Denies chest pain, palpitation, nausea, vomiting or abdominal pain. Eating and tolerating. Called wife, no answer.     Assessment and Plan:     Acute hypoxic respiratory failure due to COVID-19:  Sepsis due to COVID-19:  Covid positive on  (PCR +)  CT chest  with no PE  Continue baricitinib  Completed Decadron 9/28  Continue Symbicort, tiotropium  Continue PT OT  9/30: Oxygen weaned to 5 L HFNC. Continue baricitinib and supportive care    Insomnia secondary to [address]:  Continue trazodone at bedtime    GERD:  Continue PPI    Transaminitis secondary to Covid:  Continue to monitor, improving    Thrombocytosis:  Reactive to infection, improving  Continue to monitor    Former smoker:  Quit 1 year ago  Add complexity in patient's care    Discharge planning: Hopefully soon. Home health on discharge    DVT ppx ordered  Code status:  Full  Estimated LOS:  Greater than 2 midnights  Risk:  high    Hospital Problems as of 9/30/2021 Date Reviewed: 12/23/2020        Codes Class Noted - Resolved POA    Transaminitis ICD-10-CM: R74.01  ICD-9-CM: 790.4  9/23/2021 - Present Yes        Hypoalbuminemia ICD-10-CM: E88.09  ICD-9-CM: 273.8  9/23/2021 - Present Clinically Undetermined        Thrombocytosis ICD-10-CM: D47.3  ICD-9-CM: 238.71  9/23/2021 - Present Yes        Pneumonia due to COVID-19 virus ICD-10-CM: U07.1, J12.82  ICD-9-CM: 480.8, 079.89  9/23/2021 - Present Yes        Sleep disorder due to a general medical condition, insomnia type ICD-10-CM: G47.01  ICD-9-CM: 327.01  9/22/2021 - Present Yes        * (Principal) Acute hypoxemic respiratory failure due to COVID-19 [facility]) ICD-10-CM: U07.1, J96.01  ICD-9-CM: 518.81, 079.89, 799.02  9/18/2021 - Present Yes                10 systems reviewed and negative except as noted in HPI. Past Medical History:   Diagnosis Date    History of syncope 3/2015    states due to dehydration. Had stress test, echo at [facility] and told OK    Kidney stone     right   Sched.  for surgery 10/1/15  (has had prior episodes 1990's)      Past Surgical History:   Procedure Laterality Date    HX OTHER SURGICAL      no prior surgeries      No Known Allergies   Social History     Tobacco Use    Smoking status: Current Every Day Smoker     Years: 15.00    Smokeless tobacco: Never Used    Tobacco comment: smokes 1 pack per week   Substance Use Topics    Alcohol use: No     Alcohol/week: 0.0 standard drinks      Family History   Problem Relation Age of Onset    Hypertension Mother     Stroke Mother     Thyroid Disease Mother     Heart Disease Father       Family history reviewed and noncontributory. There is no immunization history on file for this patient. PTA Medications:  Prior to Admission Medications   Prescriptions Last Dose Informant Patient Reported? Taking?   dexAMETHasone (Decadron) 6 mg tablet 9/18/2021 at Unknown time  No Yes   Sig: Take 1 Tablet by mouth Daily (before breakfast). meloxicam (MOBIC) 15 mg tablet Not Taking at Unknown time  No No   Sig: Take 1 Tab by mouth daily. Patient not taking: Reported on 9/18/2021   ondansetron (Zofran ODT) 4 mg disintegrating tablet Not Taking at Unknown time  No No   Sig: Take 1 Tab by mouth every eight (8) hours as needed for Nausea.    Patient not taking: Reported on 9/18/2021      Facility-Administered Medications: None       Objective:     Patient Vitals for the past 24 hrs:   Temp Pulse Resp BP SpO2   09/30/21 1250 98.1 °F (36.7 °C) 90 20 115/70 95 %   09/30/21 0944     95 %   09/30/21 0851     94 %   09/30/21 0836 98 °F (36.7 °C) 91 20 118/74 95 %   09/30/21 0302 97.9 °F (36.6 °C) 83 19 104/77 95 %   09/29/21 2258 98.6 °F (37 °C) 83 19 (!) 108/59 94 %   09/29/21 2020     94 %   09/29/21 1900 98 °F (36.7 °C) 69 20 115/67 97 %     Oxygen Therapy  O2 Sat (%): 95 % (09/30/21 1250)  Pulse via Oximetry: 95 beats per minute (09/30/21 0944)  O2 Device: Hi flow nasal cannula (09/30/21 0851)  Skin Assessment: Clean, dry, & intact (09/29/21 9320)  Skin Protection for O2 Device: No (09/26/21 8349)  O2 Flow Rate (L/min): 5 l/min (09/30/21 0944)  O2 Temperature: 87.8 °F (31 °C) (09/28/21 0743)  FIO2 (%): 55 % (09/28/21 0743)    Estimated body mass index is 23.62 kg/m² as calculated from the following:    Height as of this encounter: 6' 1\" (1.854 m).    Weight as of this encounter: 81.2 kg (179 lb 0.2 oz). Intake/Output Summary (Last 24 hours) at 9/30/2021 1355  Last data filed at 9/30/2021 1328  Gross per 24 hour   Intake 870 ml   Output 2525 ml   Net -1655 ml       *Note that automatically entered I/Os may not be accurate; dependent on patient compliance with collection and accurate  by assistants. Physical Exam:  General:    Alert. Eyes:   Normal sclerae. Extraocular movements intact. HENT:  Normocephalic, atraumatic. Moist mucous membranes  CV:   RRR. No m/r/g. Lungs:  CTAB. No wheezing, rhonchi, or rales. Abdomen: Soft, nontender, nondistended. Extremities: Warm and dry. No cyanosis or edema. Neurologic: CN II-XII grossly intact. Sensation intact. Skin:     No rashes or jaundice. Normal coloration  Psych:  Normal mood and affect. I reviewed the labs, imaging, EKGs, telemetry, and other studies done this admission. Data Reviewed:   Recent Results (from the past 24 hour(s))   CBC W/O DIFF    Collection Time: 09/30/21  5:38 AM   Result Value Ref Range    WBC 8.4 4.3 - 11.1 K/uL    RBC 4.37 4.23 - 5.6 M/uL    HGB 12.8 (L) 13.6 - 17.2 g/dL    HCT 38.7 (L) 41.1 - 50.3 %    MCV 88.6 79.6 - 97.8 FL    MCH 29.3 26.1 - 32.9 PG    MCHC 33.1 31.4 - 35.0 g/dL    RDW 12.3 11.9 - 14.6 %    PLATELET 317 778 - 792 K/uL    MPV 9.9 9.4 - 12.3 FL    ABSOLUTE NRBC 0.00 0.0 - 0.2 K/uL   METABOLIC PANEL, COMPREHENSIVE    Collection Time: 09/30/21  5:38 AM   Result Value Ref Range    Sodium 138 138 - 145 mmol/L    Potassium 3.6 3.5 - 5.1 mmol/L    Chloride 102 98 - 107 mmol/L    CO2 28 21 - 32 mmol/L    Anion gap 8 7 - 16 mmol/L    Glucose 108 (H) 65 - 100 mg/dL    BUN 17 6 - 23 MG/DL    Creatinine 0.95 0.8 - 1.5 MG/DL    GFR est AA >60 >60 ml/min/1.73m2    GFR est non-AA >60 >60 ml/min/1.73m2    Calcium 8.8 8.3 - 10.4 MG/DL    Bilirubin, total 0.3 0.2 - 1.1 MG/DL    ALT (SGPT) 51 12 - 65 U/L    AST (SGOT) 17 15 - 37 U/L    Alk. phosphatase 140 (H) 50 - 136 U/L    Protein, total 5.9 (L) 6.3 - 8.2 g/dL    Albumin 2.3 (L) 3.5 - 5.0 g/dL    Globulin 3.6 (H) 2.3 - 3.5 g/dL    A-G Ratio 0.6 (L) 1.2 - 3.5         All Micro Results     Procedure Component Value Units Date/Time    CULTURE, BLOOD [[id number]] Collected: 09/18/21 0638    Order Status: Completed Specimen: Blood Updated: 09/23/21 0711     Special Requests: --        LEFT  Antecubital       Culture result: NO GROWTH 5 DAYS       CULTURE, BLOOD [[id number]] Collected: 09/18/21 0649    Order Status: Completed Specimen: Blood Updated: 09/23/21 0711     Special Requests: --        RIGHT  Antecubital       Culture result: NO GROWTH 5 DAYS             Current Facility-Administered Medications   Medication Dose Route Frequency    thiamine HCL (B-1) tablet 100 mg  100 mg Oral DAILY    hydrOXYzine pamoate (VISTARIL) capsule 25 mg  25 mg Oral TID    tiotropium bromide (SPIRIVA RESPIMAT) 2.5 mcg /actuation  2 Puff Inhalation DAILY    cholecalciferol (VITAMIN D3) (1000 Units /25 mcg) tablet 2,000 Units  2,000 Units Oral DAILY    ascorbic acid (vitamin C) (VITAMIN C) tablet 1,000 mg  1,000 mg Oral DAILY    sodium chloride (OCEAN) 0.65 % nasal squeeze bottle 2 Spray  2 Spray Both Nostrils TID    traZODone (DESYREL) tablet 200 mg  200 mg Oral QHS    guaiFENesin ER (MUCINEX) tablet 1,200 mg  1,200 mg Oral Q12H    budesonide-formoteroL (SYMBICORT) 160-4.5 mcg/actuation HFA inhaler 2 Puff  2 Puff Inhalation BID RT    pantoprazole (PROTONIX) tablet 40 mg  40 mg Oral ACB    baricitinib (OLUMIANT) tablet 4 mg  4 mg Oral DAILY    fluticasone propionate (FLONASE) 50 mcg/actuation nasal spray 2 Spray  2 Spray Both Nostrils BID    guaiFENesin-codeine (ROBITUSSIN AC) 100-10 mg/5 mL solution 10 mL  10 mL Oral Q4H PRN    sodium chloride (NS) flush 5-40 mL  5-40 mL IntraVENous Q8H    sodium chloride (NS) flush 5-40 mL  5-40 mL IntraVENous PRN    acetaminophen (TYLENOL) tablet 650 mg  650 mg Oral Q6H PRN    Or    acetaminophen (TYLENOL) suppository 650 mg  650 mg Rectal Q6H PRN    polyethylene glycol (MIRALAX) packet 17 g  17 g Oral DAILY PRN    promethazine (PHENERGAN) tablet 12.5 mg  12.5 mg Oral Q6H PRN    Or    ondansetron (ZOFRAN) injection 4 mg  4 mg IntraVENous Q6H PRN    enoxaparin (LOVENOX) injection 40 mg  40 mg SubCUTAneous DAILY       Other Studies:  No results found for this visit on 09/18/21. No results found. [unfilled]       Part of this note was written by using a voice dictation software and the note has been proof read but may still contain some grammatical/other typographical errors.     Signed:  Jemima Culp MD

## 2021-09-30 NOTE — PROGRESS NOTES
ACUTE PHYSICAL THERAPY GOALS:  (Developed with and agreed upon by patient and/or caregiver.)  (1.)Mr. Liu will move from supine to sit and sit to supine , scoot up and down and roll side to side in bed with INDEPENDENT within 5 treatment day(s) with functional O2 sats. (2.)Mr. Liu will transfer from bed to chair and chair to bed with INDEPENDENT using the least restrictive device within 5 treatment day(s) with functional O2 sats  (3.)Mr. Liu will ambulate with INDEPENDENT for >150 feet with the least restrictive device within 5 treatment day(s) with functional O2 sats. PHYSICAL THERAPY: Daily Note and AM Treatment Day # 4    Ellen Rodriges is a 64 y.o. male   PRIMARY DIAGNOSIS: Acute hypoxemic respiratory failure due to COVID-19 [facility])  [facility] hypox[facility] [U07.1, J96.01]         ASSESSMENT:     REHAB RECOMMENDATIONS: CURRENT LEVEL OF FUNCTION:  (Most Recently Demonstrated)   Recommendation to date pending progress:  [address] [place] [address] Therapy  Equipment:    To Be Determined Bed Mobility:   Independent  Sit to Stand:  [employer] Assistance  Transfers:   Contact Guard Assistance  Gait/Mobility:   Contact Guard Assistance     ASSESSMENT:  Mr. Ursula Fay is continuing to make steady progress with therapy. Pt on 7L high flow and resting in mid 90s for saturation. Pt required encouragement to participate in therapy today as he states he is more lethargic today and dehydrated. CGA for gait 2x20' in room with one drop in sats to 85% that recovered with seated rest break. Pt demonstrates unsteady gait pattern with minimal hip/knee flexion limiting foot clearance. Pt left supine in bed with needs in reach and RN notified. HHPT recommended at this time depending on progression with PT. PT to continue to follow. SUBJECTIVE:   Mr. Ursula Fay states, \"I am dehydrated\"    SOCIAL HISTORY/ LIVING ENVIRONMENT: independent. Works.    Home Environment: Private residence  # Steps to Enter: 3  One/Two Story Residence: One story  Living Alone: No  Support Systems: Spouse/Significant Other  OBJECTIVE:     PAIN: VITAL SIGNS: LINES/DRAINS:   Pre Treatment:    Post Treatment: 0/10   Continuous Pulse Oximetry  O2 Device: Hi flow nasal cannula     MOBILITY: I Mod I S SBA CGA Min Mod Max Total  NT x2 Comments:   Bed Mobility    Rolling [x] [] [] [] [] [] [] [] [] [] []    Supine to Sit [x] [] [x] [] [] [] [] [] [] [] []    Scooting [] [] [x] [] [] [] [] [] [] [] []    Sit to Supine [x] [] [x] [] [] [] [] [] [] [] []    Transfers    Sit to Stand [] [] [] [] [x] [] [] [] [] [] []    Bed to Chair [] [] [] [] [x] [] [] [] [] [] []    Stand to Sit [] [] [] [] [x] [] [] [] [] [] []    I=Independent, Mod I=Modified Independent, S=Supervision, SBA=Standby Assistance, CGA=Contact Guard Assistance,   Min=Minimal Assistance, Mod=Moderate Assistance, Max=Maximal Assistance, Total=Total Assistance, NT=Not Tested    BALANCE: Good Fair+ Fair Fair- Poor NT Comments   Sitting Static [x] [] [] [] [] []    Sitting Dynamic [x] [] [] [] [] []              Standing Static [] [] [x] [] [] []    Standing Dynamic [] [x] [x] [] [] []      GAIT: I Mod I S SBA CGA Min Mod Max Total  NT x2 Comments:   Level of Assistance [] [] [] [] [x] [] [] [] [] [] []    Distance 20ft 2x    DME None    Gait Quality Unsteady gait pattern    Weightbearing  Status N/A     I=Independent, Mod I=Modified Independent, S=Supervision, SBA=Standby Assistance, CGA=Contact Guard Assistance,   Min=Minimal Assistance, Mod=Moderate Assistance, Max=Maximal Assistance, Total=Total Assistance, NT=Not Tested    PLAN:   FREQUENCY/DURATION: PT Plan of Care: 3 times/week for duration of hospital stay or until stated goals are met, whichever comes first.  TREATMENT:     TREATMENT:   ($$ Therapeutic Activity: 8-22 mins    )  Therapeutic Activity (20 Minutes):  Therapeutic activity included Rolling, Supine to Sit, Sit to Supine, Scooting, Lateral Scooting, Transfer Training, Ambulation on level ground, Sitting balance , Standing balance and seated exercise, education on activity pacing and pursed lip breathing to improve functional Mobility, Strength, Activity tolerance and balance.     TREATMENT GRID:  N/A    AFTER TREATMENT POSITION/PRECAUTIONS:  Bed, Needs within reach and RN notified    INTERDISCIPLINARY COLLABORATION:  RN/PCT and PT/PTA    TOTAL TREATMENT DURATION:  PT Patient Time In/Time Out  Time In: 3160  Time Out: [address]

## 2021-10-01 LAB
ALBUMIN SERPL-MCNC: 2.5 G/DL (ref 3.5–5)
ALBUMIN/GLOB SERPL: 0.7 {RATIO} (ref 1.2–3.5)
ALP SERPL-CCNC: 132 U/L (ref 50–136)
ALT SERPL-CCNC: 65 U/L (ref 12–65)
ANION GAP SERPL CALC-SCNC: 8 MMOL/L (ref 7–16)
AST SERPL-CCNC: 29 U/L (ref 15–37)
BILIRUB SERPL-MCNC: 0.3 MG/DL (ref 0.2–1.1)
BUN SERPL-MCNC: 18 MG/DL (ref 6–23)
CALCIUM SERPL-MCNC: 8.7 MG/DL (ref 8.3–10.4)
CHLORIDE SERPL-SCNC: 104 MMOL/L (ref 98–107)
CO2 SERPL-SCNC: 27 MMOL/L (ref 21–32)
CREAT SERPL-MCNC: 1.03 MG/DL (ref 0.8–1.5)
ERYTHROCYTE [DISTWIDTH] IN BLOOD BY AUTOMATED COUNT: 12.5 % (ref 11.9–14.6)
GLOBULIN SER CALC-MCNC: 3.5 G/DL (ref 2.3–3.5)
GLUCOSE SERPL-MCNC: 90 MG/DL (ref 65–100)
HCT VFR BLD AUTO: 39.1 % (ref 41.1–50.3)
HGB BLD-MCNC: 13 G/DL (ref 13.6–17.2)
MCH RBC QN AUTO: 29.5 PG (ref 26.1–32.9)
MCHC RBC AUTO-ENTMCNC: 33.2 G/DL (ref 31.4–35)
MCV RBC AUTO: 88.7 FL (ref 79.6–97.8)
NRBC # BLD: 0 K/UL (ref 0–0.2)
PLATELET # BLD AUTO: 345 K/UL (ref 150–450)
PMV BLD AUTO: 9.9 FL (ref 9.4–12.3)
POTASSIUM SERPL-SCNC: 4 MMOL/L (ref 3.5–5.1)
PROT SERPL-MCNC: 6 G/DL (ref 6.3–8.2)
RBC # BLD AUTO: 4.41 M/UL (ref 4.23–5.6)
SODIUM SERPL-SCNC: 139 MMOL/L (ref 136–145)
WBC # BLD AUTO: 8.6 K/UL (ref 4.3–11.1)

## 2021-10-01 PROCEDURE — 94640 AIRWAY INHALATION TREATMENT: CPT

## 2021-10-01 PROCEDURE — 94762 N-INVAS EAR/PLS OXIMTRY CONT: CPT

## 2021-10-01 PROCEDURE — 74011250637 HC RX REV CODE- 250/637: Performed by: INTERNAL MEDICINE

## 2021-10-01 PROCEDURE — 65270000029 HC RM PRIVATE

## 2021-10-01 PROCEDURE — 97530 THERAPEUTIC ACTIVITIES: CPT

## 2021-10-01 PROCEDURE — 85027 COMPLETE CBC AUTOMATED: CPT

## 2021-10-01 PROCEDURE — 74011250637 HC RX REV CODE- 250/637: Performed by: PHYSICIAN ASSISTANT

## 2021-10-01 PROCEDURE — 74011250637 HC RX REV CODE- 250/637: Performed by: FAMILY MEDICINE

## 2021-10-01 PROCEDURE — 77010033678 HC OXYGEN DAILY

## 2021-10-01 PROCEDURE — 74011250636 HC RX REV CODE- 250/636: Performed by: INTERNAL MEDICINE

## 2021-10-01 PROCEDURE — 94761 N-INVAS EAR/PLS OXIMETRY MLT: CPT

## 2021-10-01 PROCEDURE — 80053 COMPREHEN METABOLIC PANEL: CPT

## 2021-10-01 PROCEDURE — 36415 COLL VENOUS BLD VENIPUNCTURE: CPT

## 2021-10-01 RX ADMIN — VITAMIN D, TAB 1000IU (100/BT) 2000 UNITS: 25 TAB at 08:26

## 2021-10-01 RX ADMIN — Medication 100 MG: at 08:26

## 2021-10-01 RX ADMIN — FLUTICASONE PROPIONATE 2 SPRAY: 50 SPRAY, METERED NASAL at 08:38

## 2021-10-01 RX ADMIN — HYDROXYZINE PAMOATE 25 MG: 25 CAPSULE ORAL at 16:20

## 2021-10-01 RX ADMIN — HYDROXYZINE PAMOATE 25 MG: 25 CAPSULE ORAL at 08:26

## 2021-10-01 RX ADMIN — TRAZODONE HYDROCHLORIDE 200 MG: 50 TABLET ORAL at 21:46

## 2021-10-01 RX ADMIN — FLUTICASONE PROPIONATE 2 SPRAY: 50 SPRAY, METERED NASAL at 21:00

## 2021-10-01 RX ADMIN — GUAIFENESIN 1200 MG: 600 TABLET ORAL at 08:26

## 2021-10-01 RX ADMIN — TIOTROPIUM BROMIDE INHALATION SPRAY 2 PUFF: 3.12 SPRAY, METERED RESPIRATORY (INHALATION) at 08:00

## 2021-10-01 RX ADMIN — PANTOPRAZOLE SODIUM 40 MG: 40 TABLET, DELAYED RELEASE ORAL at 05:31

## 2021-10-01 RX ADMIN — BUDESONIDE AND FORMOTEROL FUMARATE DIHYDRATE 2 PUFF: 160; 4.5 AEROSOL RESPIRATORY (INHALATION) at 08:00

## 2021-10-01 RX ADMIN — ACETAMINOPHEN 650 MG: 325 TABLET ORAL at 19:54

## 2021-10-01 RX ADMIN — HYDROXYZINE PAMOATE 25 MG: 25 CAPSULE ORAL at 21:46

## 2021-10-01 RX ADMIN — SALINE NASAL SPRAY 2 SPRAY: 1.5 SOLUTION NASAL at 16:21

## 2021-10-01 RX ADMIN — BARICITINIB 4 MG: 2 TABLET, FILM COATED ORAL at 08:26

## 2021-10-01 RX ADMIN — ENOXAPARIN SODIUM 40 MG: 40 INJECTION SUBCUTANEOUS at 08:25

## 2021-10-01 RX ADMIN — Medication 10 ML: at 21:49

## 2021-10-01 RX ADMIN — Medication 10 ML: at 05:30

## 2021-10-01 RX ADMIN — Medication 10 ML: at 14:00

## 2021-10-01 RX ADMIN — GUAIFENESIN 1200 MG: 600 TABLET ORAL at 20:08

## 2021-10-01 RX ADMIN — SALINE NASAL SPRAY 2 SPRAY: 1.5 SOLUTION NASAL at 08:38

## 2021-10-01 RX ADMIN — SALINE NASAL SPRAY 2 SPRAY: 1.5 SOLUTION NASAL at 22:00

## 2021-10-01 RX ADMIN — BUDESONIDE AND FORMOTEROL FUMARATE DIHYDRATE 2 PUFF: 160; 4.5 AEROSOL RESPIRATORY (INHALATION) at 21:30

## 2021-10-01 RX ADMIN — Medication 1000 MG: at 08:26

## 2021-10-01 NOTE — PROGRESS NOTES
Oxygen Qualifier       Room air: SpO2 with O2 and liter flow   Resting SpO2  85%  90% on 3L   Ambulating SpO2  86% on 4L  87% on 5L  88% on 6L     Patient able to ambulate in the room with slow, steady pace, 5-6 laps, requiring at least 6L of O2 via NC during ambulation. Patient fatigues quickly, and was only able to ambulate for a very short distance (around the bed once) while assessing him on 6L O2. RN notified, patient left in bed with 3L O2 via NC as he was found.     Completed by:    Naresh Vega, TIFFANY

## 2021-10-01 NOTE — PROGRESS NOTES
CM chart review. Patient will need home oxygen at discharge; unable to complete walk test today. CM will assist with arranging home oxygen when stable. Referrals have been sent to [facility] for assistance finding new PCP and to [facility]. CM notes OT recommendation for shower chair. CM verified with [facility] that [employer] does not cover shower chair or 3-in-1 BSC. CM spoke with patient by phone and notified him of the above and instructed him that a shower chair can be purchased at a pharmacy or on [address]; patient verbalized understanding. CM will continue to follow to assist with discharge needs.

## 2021-10-01 NOTE — PROGRESS NOTES
Patient on O2 @ 3L HFNC. Hourly rounds performed this shift. Bed lowered and locked. Call light within reach. All needs met at this time. Bedside shift report will be given to oncoming nurse.

## 2021-10-01 NOTE — PROGRESS NOTES
ACUTE PHYSICAL THERAPY GOALS:  (Developed with and agreed upon by patient and/or caregiver.)  (1.)Mr. Liu will move from supine to sit and sit to supine , scoot up and down and roll side to side in bed with INDEPENDENT within 5 treatment day(s) with functional O2 sats. (2.)Mr. Liu will transfer from bed to chair and chair to bed with INDEPENDENT using the least restrictive device within 5 treatment day(s) with functional O2 sats  (3.)Mr. Liu will ambulate with INDEPENDENT for >150 feet with the least restrictive device within 5 treatment day(s) with functional O2 sats. PHYSICAL THERAPY: Daily Note and AM Treatment Day # 5    Hayden Munson is a 64 y.o. male   PRIMARY DIAGNOSIS: Acute hypoxemic respiratory failure due to COVID-19 [facility])  [facility]ute hypoxemic respiratory [facility] [U07.1, J96.01]         ASSESSMENT:     REHAB RECOMMENDATIONS: CURRENT LEVEL OF FUNCTION:  (Most Recently Demonstrated)   Recommendation to date pending progress:  [address] [place] [address] Therapy  Equipment:    To Be Determined Bed Mobility:   Independent  Sit to Stand:  [employer] Assistance  Transfers:   Contact Guard Assistance  Gait/Mobility:   Contact Guard Assistance     ASSESSMENT:  Mr. Devon Galvan is continuing to make steady progress with therapy. Pt on 3L resting in mid 93% . Supine upon arrival.  He states I just walk with RT. Supine>EOB with SBA, while sitting on EOB he work on exercises and balance. Then took some side steps to [facility] with SBA. Return to supine with needs in reach and instructed to call for assistants. SUBJECTIVE:   Mr. Devon Galvan states, \"I am dehydrated\"    SOCIAL HISTORY/ LIVING ENVIRONMENT: independent. Works.    Home Environment: Private residence  # Steps to Enter: 3  One/Two Story Residence: One story  Living Alone: No  Support Systems: Spouse/Significant Other  OBJECTIVE:     PAIN: VITAL SIGNS: LINES/DRAINS:   Pre Treatment:    Post Treatment: 0/10   Continuous Pulse Oximetry  O2 Device: Hi flow nasal cannula     MOBILITY: I Mod I S SBA CGA Min Mod Max Total  NT x2 Comments:   Bed Mobility    Rolling [x] [] [] [] [] [] [] [] [] [] []    Supine to Sit [x] [] [x] [] [] [] [] [] [] [] []    Scooting [] [] [x] [] [] [] [] [] [] [] []    Sit to Supine [x] [] [x] [] [] [] [] [] [] [] []    Transfers    Sit to Stand [] [] [] [] [x] [] [] [] [] [] []    Bed to Chair [] [] [] [] [x] [] [] [] [] [] []    Stand to Sit [] [] [] [] [x] [] [] [] [] [] []    I=Independent, Mod I=Modified Independent, S=Supervision, SBA=Standby Assistance, CGA=Contact Guard Assistance,   Min=Minimal Assistance, Mod=Moderate Assistance, Max=Maximal Assistance, Total=Total Assistance, NT=Not Tested    BALANCE: Good Fair+ Fair Fair- Poor NT Comments   Sitting Static [x] [] [] [] [] []    Sitting Dynamic [x] [] [] [] [] []              Standing Static [] [] [x] [] [] []    Standing Dynamic [] [x] [x] [] [] []      GAIT: I Mod I S SBA CGA Min Mod Max Total  NT x2 Comments:   Level of Assistance [] [] [] [] [x] [] [] [] [] [] []    Distance Just steps to [facility]    DME None    Gait Quality Unsteady gait pattern    Weightbearing  Status N/A     I=Independent, Mod I=Modified Independent, S=Supervision, SBA=Standby Assistance, CGA=Contact Guard Assistance,   Min=Minimal Assistance, Mod=Moderate Assistance, Max=Maximal Assistance, Total=Total Assistance, NT=Not Tested    PLAN:   FREQUENCY/DURATION: PT Plan of Care: 3 times/week for duration of hospital stay or until stated goals are met, whichever comes first.  TREATMENT:     TREATMENT:   ($$ Therapeutic Activity: 23-37 mins    )  Therapeutic Activity (23 Minutes): Therapeutic activity included Rolling, Supine to Sit, Sit to Supine, Scooting, Lateral Scooting, Transfer Training, Ambulation on level ground, Sitting balance  and Standing balance to improve functional Mobility, Strength, ROM, Activity tolerance and balance.    Date:  10/1 Date:   Date:     Activity/Exercise Parameters Parameters Parameters   Ankle pumps 12 b     Marching place 12 b     LAQ 12 b     Hip abd/add 12 b                         TREATMENT GRID:  N/A    AFTER TREATMENT POSITION/PRECAUTIONS:  Bed, Needs within reach and RN notified    INTERDISCIPLINARY COLLABORATION:  RN/PCT and PT/PTA    TOTAL TREATMENT DURATION:  PT Patient Time In/Time Out  Time In: 1100  Time Out: [address] Sharron Carreon PTA

## 2021-10-01 NOTE — PROGRESS NOTES
[facility] Hospitalist Note     Admit Date:  2021  5:52 AM   Name:  Gabriela Nguyen   Age:  64 y.o.  :  1965   MRN:  [id number]   PCP:  None  Treatment Team: Attending Provider: Eliane Howell MD; Utilization Review: Micaela Avila; Care Manager: Krystin Fox, RN; Primary Nurse: Frederick Betancourt RN; Physical Therapy Assistant: Lexi Muller PTA; Charge Nurse: Lulú Oahra RN    Hospital course/Subjective:   Harika Wylie a 64 y. o. male with PMHx Former smoker (quit 1 year ago), Ray Partida presented to ED with worsening SOB and fatigue. Reports symptoms started around  and was tested positive for COVID19 on .  His symptoms were mild initially and therefore he did not seek any medical evaluation. Tani Anthony was seen on  in the Health system ED with worsening lethargy, shortness of breath.  He was saturating well on room air and therefore was discharged home with Decadron.    In the ED, patient was found to be 84% on room air.  Saturation improved with O2 supplement.  Currently on 15 L high flow nasal cannula plus nonrebreather to maintain saturation of 94%. SARS-CoV-2 PCR ordered 2021 resulted 2020 POSITIVE. On  Went to bathroom to clean up without assistance and found with spo2 77%. Placed on 15L HFNC bc he was insistent on taking a shower and when placed back on optiflow 45/60% (previous settings), he was satting 80% and fio2 increased to 100%. On  patient weaned to 7 L high flow nasal cannula.     10/1: Patient is seen at the bedside. On 5 L high flow nasal cannula, decreased to 3 L at bedside. Patient requiring up to 6 L with ambulation. Otherwise reports feeling better. Denies chest pain, palpitation, nausea, vomiting or abdominal pain. Called wife, no answer.     Assessment and Plan:     Acute hypoxic respiratory failure due to COVID-19:  Sepsis due to COVID-19:  Covid positive on  (PCR +)  CT chest  with no PE  Continue baricitinib  Completed Decadron 9/28  Continue Symbicort, tiotropium  Continue PT OT  10/1: Oxygen weaned to 3, not able to complete 6-minute walk test, requiring more than 6 L with ambulation. Continue baricitinib and supportive care    Insomnia secondary to [address]:  Continue trazodone at bedtime    GERD:  Continue PPI    Transaminitis secondary to Covid:  Continue to monitor, improving    Thrombocytosis:  Reactive to infection, improving  Continue to monitor    Former smoker:  Quit 1 year ago  Add complexity in patient's care    Discharge planning: Hopefully soon. Home health on discharge    DVT ppx ordered  Code status:  Full  Estimated LOS:  Greater than 2 midnights  Risk:  high    Hospital Problems as of 10/1/2021 Date Reviewed: 12/23/2020        Codes Class Noted - Resolved POA    Transaminitis ICD-10-CM: R74.01  ICD-9-CM: 790.4  9/23/2021 - Present Yes        Hypoalbuminemia ICD-10-CM: E88.09  ICD-9-CM: 273.8  9/23/2021 - Present Clinically Undetermined        Thrombocytosis ICD-10-CM: T13.362  ICD-9-CM: 238.71  9/23/2021 - Present Yes        Pneumonia due to COVID-19 virus ICD-10-CM: U07.1, J12.82  ICD-9-CM: 480.8, 079.89  9/23/2021 - Present Yes        Sleep disorder due to a general medical condition, insomnia type ICD-10-CM: G47.01  ICD-9-CM: 327.01  9/22/2021 - Present Yes        * (Principal) Acute hypoxemic respiratory failure due to COVID-19 [facility]) ICD-10-CM: U07.1, J96.01  ICD-9-CM: 518.81, 079.89, 799.02  9/18/2021 - Present Yes                10 systems reviewed and negative except as noted in HPI. Past Medical History:   Diagnosis Date    History of syncope 3/2015    states due to dehydration. Had stress test, echo at [facility] and told OK    Kidney stone     right   Sched.  for surgery 10/1/15  (has had prior episodes 1990's)      Past Surgical History:   Procedure Laterality Date    HX OTHER SURGICAL      no prior surgeries      No Known Allergies   Social History     Tobacco Use    Smoking status: Current Every Day Smoker     Years: 15.00    Smokeless tobacco: Never Used    Tobacco comment: smokes 1 pack per week   Substance Use Topics    Alcohol use: No     Alcohol/week: 0.0 standard drinks      Family History   Problem Relation Age of Onset    Hypertension Mother     Stroke Mother     Thyroid Disease Mother     Heart Disease Father       Family history reviewed and noncontributory. There is no immunization history on file for this patient. PTA Medications:  Prior to Admission Medications   Prescriptions Last Dose Informant Patient Reported? Taking?   dexAMETHasone (Decadron) 6 mg tablet 9/18/2021 at Unknown time  No Yes   Sig: Take 1 Tablet by mouth Daily (before breakfast). meloxicam (MOBIC) 15 mg tablet Not Taking at Unknown time  No No   Sig: Take 1 Tab by mouth daily. Patient not taking: Reported on 9/18/2021   ondansetron (Zofran ODT) 4 mg disintegrating tablet Not Taking at Unknown time  No No   Sig: Take 1 Tab by mouth every eight (8) hours as needed for Nausea.    Patient not taking: Reported on 9/18/2021      Facility-Administered Medications: None       Objective:     Patient Vitals for the past 24 hrs:   Temp Pulse Resp BP SpO2   10/01/21 1114 97.9 °F (36.6 °C) 88 18 109/73 93 %   10/01/21 0818 98.3 °F (36.8 °C) 80 18 107/71 93 %   10/01/21 0800     93 %   10/01/21 0529 98.9 °F (37.2 °C) 84 20 92/60 92 %   09/30/21 2346 97.9 °F (36.6 °C) 96 20 117/70 95 %   09/30/21 2129     93 %   09/30/21 2007 97.5 °F (36.4 °C) 90 20 125/69 95 %   09/30/21 1552 97.4 °F (36.3 °C) 84 20 116/84 97 %   09/30/21 1518     97 %   09/30/21 1250 98.1 °F (36.7 °C) 90 20 115/70 95 %     Oxygen Therapy  O2 Sat (%): 93 % (10/01/21 1114)  Pulse via Oximetry: 88 beats per minute (10/01/21 0800)  O2 Device: Hi flow nasal cannula (10/01/21 1040)  Skin Assessment: Clean, dry, & intact (09/29/21 3576)  Skin Protection for O2 Device: No (09/26/21 8065)  O2 Flow Rate (L/min): 3 l/min (10/01/21 1030)  O2 Temperature: 87.8 °F (31 °C) (09/28/21 0743)  FIO2 (%): 55 % (09/28/21 0743)    Estimated body mass index is 23.62 kg/m² as calculated from the following:    Height as of this encounter: 6' 1\" (1.854 m). Weight as of this encounter: 81.2 kg (179 lb 0.2 oz). Intake/Output Summary (Last 24 hours) at 10/1/2021 1211  Last data filed at 9/30/2021 2347  Gross per 24 hour   Intake 480 ml   Output 1900 ml   Net -1420 ml       *Note that automatically entered I/Os may not be accurate; dependent on patient compliance with collection and accurate  by assistants. Physical Exam:  General:    Alert. Eyes:   Normal sclerae. Extraocular movements intact. HENT:  Normocephalic, atraumatic. Moist mucous membranes  CV:   RRR. No m/r/g. Lungs:  CTAB. No wheezing, rhonchi, or rales. Abdomen: Soft, nontender, nondistended. Extremities: Warm and dry. No cyanosis or edema. Neurologic: CN II-XII grossly intact. Sensation intact. Skin:     No rashes or jaundice. Normal coloration  Psych:  Normal mood and affect. I reviewed the labs, imaging, EKGs, telemetry, and other studies done this admission.   Data Reviewed:   Recent Results (from the past 24 hour(s))   CBC W/O DIFF    Collection Time: 10/01/21  5:55 AM   Result Value Ref Range    WBC 8.6 4.3 - 11.1 K/uL    RBC 4.41 4.23 - 5.6 M/uL    HGB 13.0 (L) 13.6 - 17.2 g/dL    HCT 39.1 (L) 41.1 - 50.3 %    MCV 88.7 79.6 - 97.8 FL    MCH 29.5 26.1 - 32.9 PG    MCHC 33.2 31.4 - 35.0 g/dL    RDW 12.5 11.9 - 14.6 %    PLATELET 335 261 - 817 K/uL    MPV 9.9 9.4 - 12.3 FL    ABSOLUTE NRBC 0.00 0.0 - 0.2 K/uL   METABOLIC PANEL, COMPREHENSIVE    Collection Time: 10/01/21  5:55 AM   Result Value Ref Range    Sodium 139 136 - 145 mmol/L    Potassium 4.0 3.5 - 5.1 mmol/L    Chloride 104 98 - 107 mmol/L    CO2 27 21 - 32 mmol/L    Anion gap 8 7 - 16 mmol/L    Glucose 90 65 - 100 mg/dL    BUN 18 6 - 23 MG/DL    Creatinine 1.03 0.8 - 1.5 MG/DL    GFR est AA >60 >60 ml/min/1.73m2    GFR est non-AA >60 >60 ml/min/1.73m2    Calcium 8.7 8.3 - 10.4 MG/DL    Bilirubin, total 0.3 0.2 - 1.1 MG/DL    ALT (SGPT) 65 12 - 65 U/L    AST (SGOT) 29 15 - 37 U/L    Alk.  phosphatase 132 50 - 136 U/L    Protein, total 6.0 (L) 6.3 - 8.2 g/dL    Albumin 2.5 (L) 3.5 - 5.0 g/dL    Globulin 3.5 2.3 - 3.5 g/dL    A-G Ratio 0.7 (L) 1.2 - 3.5         All Micro Results     Procedure Component Value Units Date/Time    CULTURE, BLOOD [[id number]] Collected: 09/18/21 0638    Order Status: Completed Specimen: Blood Updated: 09/23/21 0711     Special Requests: --        LEFT  Antecubital       Culture result: NO GROWTH 5 DAYS       CULTURE, BLOOD [[id number]] Collected: 09/18/21 0649    Order Status: Completed Specimen: Blood Updated: 09/23/21 0711     Special Requests: --        RIGHT  Antecubital       Culture result: NO GROWTH 5 DAYS             Current Facility-Administered Medications   Medication Dose Route Frequency    thiamine HCL (B-1) tablet 100 mg  100 mg Oral DAILY    hydrOXYzine pamoate (VISTARIL) capsule 25 mg  25 mg Oral TID    tiotropium bromide (SPIRIVA RESPIMAT) 2.5 mcg /actuation  2 Puff Inhalation DAILY    cholecalciferol (VITAMIN D3) (1000 Units /25 mcg) tablet 2,000 Units  2,000 Units Oral DAILY    ascorbic acid (vitamin C) (VITAMIN C) tablet 1,000 mg  1,000 mg Oral DAILY    sodium chloride (OCEAN) 0.65 % nasal squeeze bottle 2 Spray  2 Spray Both Nostrils TID    traZODone (DESYREL) tablet 200 mg  200 mg Oral QHS    guaiFENesin ER (MUCINEX) tablet 1,200 mg  1,200 mg Oral Q12H    budesonide-formoteroL (SYMBICORT) 160-4.5 mcg/actuation HFA inhaler 2 Puff  2 Puff Inhalation BID RT    pantoprazole (PROTONIX) tablet 40 mg  40 mg Oral ACB    baricitinib (OLUMIANT) tablet 4 mg  4 mg Oral DAILY    fluticasone propionate (FLONASE) 50 mcg/actuation nasal spray 2 Spray  2 Spray Both Nostrils BID    guaiFENesin-codeine (ROBITUSSIN AC) 100-10 mg/5 mL solution 10 mL  10 mL Oral Q4H PRN    sodium chloride (NS) flush 5-40 mL  5-40 mL IntraVENous Q8H    sodium chloride (NS) flush 5-40 mL  5-40 mL IntraVENous PRN    acetaminophen (TYLENOL) tablet 650 mg  650 mg Oral Q6H PRN    Or    acetaminophen (TYLENOL) suppository 650 mg  650 mg Rectal Q6H PRN    polyethylene glycol (MIRALAX) packet 17 g  17 g Oral DAILY PRN    promethazine (PHENERGAN) tablet 12.5 mg  12.5 mg Oral Q6H PRN    Or    ondansetron (ZOFRAN) injection 4 mg  4 mg IntraVENous Q6H PRN    enoxaparin (LOVENOX) injection 40 mg  40 mg SubCUTAneous DAILY       Other Studies:  No results found for this visit on 09/18/21. No results found. [unfilled]       Part of this note was written by using a voice dictation software and the note has been proof read but may still contain some grammatical/other typographical errors.     Signed:  Td Aviles MD

## 2021-10-02 VITALS
WEIGHT: 179.01 LBS | DIASTOLIC BLOOD PRESSURE: 62 MMHG | BODY MASS INDEX: 23.73 KG/M2 | SYSTOLIC BLOOD PRESSURE: 110 MMHG | OXYGEN SATURATION: 92 % | TEMPERATURE: 97.8 F | HEART RATE: 71 BPM | HEIGHT: 73 IN | RESPIRATION RATE: 18 BRPM

## 2021-10-02 LAB
ALBUMIN SERPL-MCNC: 2.3 G/DL (ref 3.5–5)
ALBUMIN/GLOB SERPL: 0.6 {RATIO} (ref 1.2–3.5)
ALP SERPL-CCNC: 118 U/L (ref 50–136)
ALT SERPL-CCNC: 57 U/L (ref 12–65)
ANION GAP SERPL CALC-SCNC: 5 MMOL/L (ref 7–16)
AST SERPL-CCNC: 23 U/L (ref 15–37)
BILIRUB SERPL-MCNC: 0.3 MG/DL (ref 0.2–1.1)
BUN SERPL-MCNC: 17 MG/DL (ref 6–23)
CALCIUM SERPL-MCNC: 8.5 MG/DL (ref 8.3–10.4)
CHLORIDE SERPL-SCNC: 105 MMOL/L (ref 98–107)
CO2 SERPL-SCNC: 28 MMOL/L (ref 21–32)
CREAT SERPL-MCNC: 1 MG/DL (ref 0.8–1.5)
GLOBULIN SER CALC-MCNC: 3.6 G/DL (ref 2.3–3.5)
GLUCOSE SERPL-MCNC: 88 MG/DL (ref 65–100)
POTASSIUM SERPL-SCNC: 3.9 MMOL/L (ref 3.5–5.1)
PROT SERPL-MCNC: 5.9 G/DL (ref 6.3–8.2)
SODIUM SERPL-SCNC: 138 MMOL/L (ref 136–145)

## 2021-10-02 PROCEDURE — 94640 AIRWAY INHALATION TREATMENT: CPT

## 2021-10-02 PROCEDURE — 74011250636 HC RX REV CODE- 250/636: Performed by: INTERNAL MEDICINE

## 2021-10-02 PROCEDURE — 74011250637 HC RX REV CODE- 250/637: Performed by: FAMILY MEDICINE

## 2021-10-02 PROCEDURE — 94762 N-INVAS EAR/PLS OXIMTRY CONT: CPT

## 2021-10-02 PROCEDURE — 36415 COLL VENOUS BLD VENIPUNCTURE: CPT

## 2021-10-02 PROCEDURE — 74011250637 HC RX REV CODE- 250/637: Performed by: PHYSICIAN ASSISTANT

## 2021-10-02 PROCEDURE — 77010033711 HC HIGH FLOW OXYGEN

## 2021-10-02 PROCEDURE — 80053 COMPREHEN METABOLIC PANEL: CPT

## 2021-10-02 RX ORDER — PANTOPRAZOLE SODIUM 40 MG/1
40 TABLET, DELAYED RELEASE ORAL
Qty: 14 TABLET | Refills: 0 | Status: SHIPPED | OUTPATIENT
Start: 2021-10-03 | End: 2021-10-17

## 2021-10-02 RX ORDER — BUDESONIDE AND FORMOTEROL FUMARATE DIHYDRATE 80; 4.5 UG/1; UG/1
2 AEROSOL RESPIRATORY (INHALATION) 2 TIMES DAILY
Qty: 1 EACH | Refills: 0 | Status: SHIPPED | OUTPATIENT
Start: 2021-10-02 | End: 2021-10-16

## 2021-10-02 RX ORDER — FLUTICASONE PROPIONATE 50 MCG
2 SPRAY, SUSPENSION (ML) NASAL DAILY
Qty: 1 EACH | Refills: 0 | Status: SHIPPED | OUTPATIENT
Start: 2021-10-02 | End: 2021-10-09

## 2021-10-02 RX ADMIN — ENOXAPARIN SODIUM 40 MG: 40 INJECTION SUBCUTANEOUS at 08:17

## 2021-10-02 RX ADMIN — GUAIFENESIN 1200 MG: 600 TABLET ORAL at 08:17

## 2021-10-02 RX ADMIN — BARICITINIB 4 MG: 2 TABLET, FILM COATED ORAL at 08:17

## 2021-10-02 RX ADMIN — FLUTICASONE PROPIONATE 2 SPRAY: 50 SPRAY, METERED NASAL at 08:19

## 2021-10-02 RX ADMIN — Medication 100 MG: at 08:17

## 2021-10-02 RX ADMIN — PANTOPRAZOLE SODIUM 40 MG: 40 TABLET, DELAYED RELEASE ORAL at 05:31

## 2021-10-02 RX ADMIN — HYDROXYZINE PAMOATE 25 MG: 25 CAPSULE ORAL at 08:17

## 2021-10-02 RX ADMIN — TIOTROPIUM BROMIDE INHALATION SPRAY 2 PUFF: 3.12 SPRAY, METERED RESPIRATORY (INHALATION) at 08:23

## 2021-10-02 RX ADMIN — Medication 10 ML: at 05:27

## 2021-10-02 RX ADMIN — Medication 1000 MG: at 08:17

## 2021-10-02 RX ADMIN — VITAMIN D, TAB 1000IU (100/BT) 2000 UNITS: 25 TAB at 08:17

## 2021-10-02 RX ADMIN — BUDESONIDE AND FORMOTEROL FUMARATE DIHYDRATE 2 PUFF: 160; 4.5 AEROSOL RESPIRATORY (INHALATION) at 08:22

## 2021-10-02 RX ADMIN — SALINE NASAL SPRAY 2 SPRAY: 1.5 SOLUTION NASAL at 08:19

## 2021-10-02 NOTE — PROGRESS NOTES
The patient is medically stable for discharge. CM spoke with patient via phone, to discuss discharge needs. Patient voiced no discharge/supportive needs at this time. Patient to discharge home with [facility] and home o2 arranged with Carin Lambert via [facility]. Transport scheduled for 1430. Spouse notified. Staff to follow up within the community, to assist with home o2 set up. Referral has been placed with [facility], to assist with follow up care. Please consult or notify CM if any needs shall arise. CM remains available. Care Management Interventions  PCP Verified by CM:  Yes [facility] referral placed)  Mode of Transport at Discharge: BLS [facility]. )  Transition of Care Consult (CM Consult): [address]: Yes  Discharge Durable Medical Equipment: No  Physical Therapy Consult: Yes  Occupational Therapy Consult: Yes  Speech Therapy Consult: No  Support Systems: Spouse/Significant Other  Confirm Follow Up Transport: Family  Discharge Location  Discharge Placement: Home with home [facility])

## 2021-10-02 NOTE — PROGRESS NOTES
Oxygen Qualifier       Room air: SpO2 with O2 and liter flow   Resting SpO2  92%  91% on 2L   Ambulating SpO2  87% 91% on 3L     Pt did well on RA while up and walking to restroom but after one lap in room, he dropped to 87% on RA. He required 3L of O2 to keep his sats at 90%.     Completed by:    Navin Flood, PTA

## 2021-10-02 NOTE — DISCHARGE INSTRUCTIONS
DISCHARGE SUMMARY from Nurse    PATIENT INSTRUCTIONS:    After general anesthesia or intravenous sedation, for 24 hours or while taking prescription Narcotics:  · Limit your activities  · Do not drive and operate hazardous machinery  · Do not make important personal or business decisions  · Do  not drink alcoholic beverages  · If you have not urinated within 8 hours after discharge, please contact your surgeon on call. Report the following to your surgeon:  · Excessive pain, swelling, redness or odor of or around the surgical area  · Temperature over 100.5  · Nausea and vomiting lasting longer than 4 hours or if unable to take medications  · Any signs of decreased circulation or nerve impairment to extremity: change in color, persistent  numbness, tingling, coldness or increase pain  · Any questions    What to do at Home:      Patient Education   Learning About Coronavirus (COVID-19)  Coronavirus [phone]): Overview  What is coronavirus (COVID-19)? The coronavirus disease (COVID-19) is caused by a virus. It is an illness that was first found in [place], [place], in December 2019. It has since spread worldwide. The virus can cause fever, cough, and trouble breathing. In severe cases, it can cause pneumonia and make it hard to breathe without help. It can cause death. Coronaviruses are a large group of viruses. They cause the common cold. They also cause more serious illnesses like Middle East respiratory syndrome (MERS) and severe acute respiratory syndrome (SARS). COVID-19 is caused by a novel coronavirus. That means it's a new type that has not been seen in people before. This virus spreads person-to-person through droplets from coughing and sneezing. It can also spread when you are close to someone who is infected. And it can spread when you touch something that has the virus on it, such as a doorknob or a tabletop. What can you do to protect yourself from coronavirus (COVID-19)?   The best way to protect yourself from getting sick is to:  · Avoid areas where there is an outbreak. · Avoid contact with people who may be infected. · Wash your hands often with soap or alcohol-based hand sanitizers. · Avoid crowds and try to stay at least 6 feet away from other people. · Wash your hands often, especially after you cough or sneeze. Use soap and water, and scrub for at least 20 seconds. If soap and water aren't available, use an alcohol-based hand . · Avoid touching your mouth, nose, and eyes. What can you do to avoid spreading the virus to others? To help avoid spreading the virus to others:  · Cover your mouth with a tissue when you cough or sneeze. Then throw the tissue in the trash. · Use a disinfectant to clean things that you touch often. · Stay home if you are sick or have been exposed to the virus. Don't go to school, work, or public areas. And don't use public transportation. · If you are sick:  ? Leave your home only if you need to get medical care. But call the doctor's office first so they know you're coming. And wear a face mask, if you have one.  ? If you have a face mask, wear it whenever you're around other people. It can help stop the spread of the virus when you cough or sneeze. ? Clean and disinfect your home every day. Use household  and disinfectant wipes or sprays. Take special care to clean things that you grab with your hands. These include doorknobs, remote controls, phones, and handles on your refrigerator and microwave. And don't forget countertops, tabletops, bathrooms, and computer keyboards. When to call for help  Call 911 anytime you think you may need emergency care. For example, call if:  · You have severe trouble breathing. (You can't talk at all.)  · You have constant chest pain or pressure. · You are severely dizzy or lightheaded. · You are confused or can't think clearly. · Your face and lips have a blue color.   · You pass out (lose consciousness) or are very hard to wake up. Call your doctor now if you develop symptoms such as:  · Shortness of breath. · Fever. · Cough. If you need to get care, call ahead to the doctor's office for instructions before you go. Make sure you wear a face mask, if you have one, to prevent exposing other people to the virus. Where can you get the latest information? The following health organizations are tracking and studying this virus. Their websites contain the most up-to-date information. Casi Cheryl also learn what to do if you think you may have been exposed to the virus. · U.S. Centers for Disease Control and Prevention (CDC): The CDC provides updated news about the disease and travel advice. The website also tells you how to prevent the spread of infection. www.cdc.gov  · World Health Organization [facility]: WHO offers information about the virus outbreaks. WHO also has travel advice. www.who.int  Current as of: April 1, 2020               Content Version: 12.4  © 2006-2020 [employer]. Care instructions adapted under license by your healthcare professional. If you have questions about a medical condition or this instruction, always ask your healthcare professional. [address] any warranty or liability for your use of this information. *  Please give a list of your current medications to your Primary Care Provider. *  Please update this list whenever your medications are discontinued, doses are      changed, or new medications (including over-the-counter products) are added. *  Please carry medication information at all times in case of emergency situations. These are general instructions for a healthy lifestyle:    No smoking/ No tobacco products/ Avoid exposure to second hand smoke  Surgeon General's Warning:  Quitting smoking now greatly reduces serious risk to your health.     Obesity, smoking, and sedentary lifestyle greatly increases your risk for illness    A healthy diet, regular physical exercise & weight monitoring are important for maintaining a healthy lifestyle    You may be retaining fluid if you have a history of heart failure or if you experience any of the following symptoms:  Weight gain of 3 pounds or more overnight or 5 pounds in a week, increased swelling in our hands or feet or shortness of breath while lying flat in bed. Please call your doctor as soon as you notice any of these symptoms; do not wait until your next office visit. The discharge information has been reviewed with the patient. The patient verbalized understanding. Discharge medications reviewed with the patient and appropriate educational materials and side effects teaching were provided.   ___________________________________________________________________________________________________________________________________

## 2021-10-02 NOTE — DISCHARGE SUMMARY
Hospitalist Discharge Summary     Admit Date:  2021  5:52 AM   DC note date: 10/2/2021  Name:  Diego Yuan   Age:  64 y.o.  :  1965   MRN:  [id number]   PCP:  None  Treatment Team: Attending Provider: Darío Foote MD; Utilization Review: Shabnam Del Rosario; Care Manager: Sergio Perez RN    Problem List for this Hospitalization:  Hospital Problems as of 10/2/2021 Date Reviewed: 2020        Codes Class Noted - Resolved POA    Transaminitis ICD-10-CM: R74.01  ICD-9-CM: 790.4  2021 - Present Yes        Hypoalbuminemia ICD-10-CM: E88.09  ICD-9-CM: 273.8  2021 - Present Clinically Undetermined        Thrombocytosis ICD-10-CM: L70.438  ICD-9-CM: 238.71  2021 - Present Yes        Pneumonia due to COVID-19 virus ICD-10-CM: U07.1, J12.82  ICD-9-CM: 480.8, 079.89  2021 - Present Yes        Sleep disorder due to a general medical condition, insomnia type ICD-10-CM: G47.01  ICD-9-CM: 327.01  2021 - Present Yes        * (Principal) Acute hypoxemic respiratory failure due to COVID-19 [facility]) ICD-10-CM: U07.1, J96.01  ICD-9-CM: 518.81, 079.89, 799.02  2021 - Present Yes            Admission HPI from 2021:     Diego Yuan is a 64 y.o. male with no significant past medical history who presented to ED with worsening shortness of breath and fatigue. Reports symptoms started around  and was tested positive for COVID19 on . His symptoms are mild at that time and therefore he did not seek any medical evaluation. He was seen on  in the [facility] ED with worsening lethargy, shortness of breath. He was saturating well on room air and therefore was discharged home with Decadron. He reports that he took all but 2 pills of Decadron since then but his symptoms have worsened. He has been checking his oxygen saturation at home and has been consistently staying in mid 80s past couple days.   Also reporting poor appetite, loss of taste and smell, nausea, dry cough, fever and chills. In the ED, patient was found to be 84% on room air. Saturation improved with O2 supplement. Currently on 15 L high flow nasal cannula plus nonrebreather to maintain saturation of 94%. Laboratory work up is remarkable for WBC 11.2, BUN 16, creatinine 1.03,  Calcitonin of 0.14, CRP of 6.6 x-ray with diffuse bilateral lung infiltrates. EKG showed sinus rhythm with nonspecific T and ST wave abnormalities. SARS-CoV-2 PCR pending. \"    Hospital Course:  Patient presented to the ED with worsening shortness of breath. Admitted due to acute hypoxic respiratory failure and sepsis secondary to COVID-19 infection. In the ED, patient was found to be 84% on room air.  Saturation improved with O2 supplement. Initially on 15 L high flow nasal cannula plus nonrebreather to maintain saturation of 94%. SARS-CoV-2 PCR ordered 9/18/2021 resulted 9/21/2020 POSITIVE. CT chest 9/19 with no PE. Patient started on baricitinib and Decadron. On 9/23 he went to bathroom to clean up without assistance and found with spo2 77%. Placed on 15L HFNC bc he was insistent on taking a shower and then placed back on optiflow 45/60% (previous settings), he was satting 80% and fio2 increased to 100%. On 9/28 patient weaned to 7 L high flow nasal cannula. Oxygen requirement progressively improved. Oxygen qualifier as below. All other chronic comorbidities stable and we continued essential home meds. Physical therapy recommend home health. Patient is stable to discharge home today with home health and home oxygen. Patient to follow-up PCP in 2 to 3 days. Oxygen Qualifier          Room air: SpO2 with O2 and liter flow   Resting SpO2  92%  91% on 2L   Ambulating SpO2  87% 91% on 3L         Disposition: Home Health Care Svc  Activity: Activity as tolerated  Diet: ADULT DIET Regular  ADULT ORAL NUTRITION SUPPLEMENT Breakfast, Lunch, Dinner; Standard High Calorie/High Protein  Code Status: Full Code    Follow Up Orders:   Follow-up Appointments   Procedures    FOLLOW UP VISIT Appointment in: 3 - 5 Days PCP     PCP     Standing Status:   Standing     Number of Occurrences:   1     Order Specific Question:   Appointment in     Answer:   3 - 5 Days       Follow-up Information     Follow up With Specialties Details Why JAMES Zapata should receive a call from Physician Services within 2 days of discharge to schedule an appointment with a primary care doctor. If you do not hear from them within 48 hours of discharge, please call [phone] [place]  Will contact you within 48 hours to schedule home visit. [address] [address]  [phone]    None    None (729) Patient stated that they have no PCP            Discharge meds at bottom of this note. Plan was discussed with patient. All questions answered. Patient was stable at time of discharge. Given instructions to call a physician or return if any concerns. Discharge summary and encounter summary was sent to PCP electronically via \"[employer]\" link in [employer], if possible. Diagnostic Imaging/Tests:   XR CHEST SNGL V    Result Date: 9/24/2021  EXAM: CHEST X-RAY, 1 VIEW INDICATION: covid pna. COMPARISON: Chest x-ray 9/18/2021, chest CT 9/19/2021 TECHNIQUE: Single AP view of the chest was obtained. FINDINGS: Diffuse bilateral multifocal lung infiltrates, which appear worse when compared to the comparison chest x-ray. No pneumothorax or effusion. The cardiac silhouette is stable. The bones are unchanged. Interval worsening of diffuse bilateral multifocal lung infiltrates. XR CHEST PA LAT    Result Date: 9/13/2021  TWO-VIEW CHEST: CLINICAL HISTORY: Short of breath, cough, and fatigue recently positive Covid tests. COMPARISON:  None.  FINDINGS: PA and lateral chest images demonstrate no confluent pneumonic infiltrate or significant pleural fluid collection. There is subtle patchy infiltrate at both lung bases, more marked on the left with mild elevation of the hemidiaphragm. The heart size is within normal limits without evidence of congestive heart failure or pneumothorax. The bony thorax appears intact on these views. SUBTLE BIBASILAR ATELECTASIS/INFILTRATE, MORE MARKED ON THE LEFT. XR CHEST PORT    Result Date: 9/18/2021  EXAM: XR CHEST PORT HISTORY: Syncope. TECHNIQUE: Frontal chest. COMPARISON: 9/13/2021 FINDINGS: The cardiac silhouette, mediastinum, and pulmonary vasculature are within normal limits. Diffuse, bilateral lung infiltrates are observed. There is no pleural effusion, or pneumothorax. No significant osseous abnormalities are observed. Diffuse, bilateral lung infiltrates are appreciated as described above. This pattern is compatible with atypical pneumonia, including viral pneumonia. CT CHEST PULMONARY EMBOLISM    Result Date: 9/19/2021  CT Chest with contrast Clinical Indication:  Subacute worsening of severe shortness of breath, hypoxic today, further evaluation of bilateral lung opacities, Covid-19 positive on 9/6/2021 Comparison:  Chest radiography yesterday, abdominal CT 12/18/2020 and 10/31/2020 Technique:  Dose reduction technique used: Automated exposure Control and/or adjustment of mA and kV according to patient size. Contiguous axial images were obtained from the neck base through the upper abdomen following the uneventful administration of 100 cc Isovue 370 intravenous contrast. Pulmonary embolus protocol was used. Coronal reconstructions were done for further evaluation of vessels. Findings:  The pulmonary arteries are normal in caliber, well opacified, demonstrating no evidence of a filling defect. Aorta is normal in caliber with no acute abnormality. No pleural or pericardial effusion, overt thoracic lymphadenopathy, or suspicious thyroid finding. Normal caliber esophagus.  Bones and Limited upper abdominal views demonstrate no acute abnormalities. Tiny round hypodensities in the liver are too small to characterize, statistically benign incidental cysts in the absence of clinical suspicion Lung windows demonstrate mild diffuse geographic groundglass opacities, and scattered small areas of minimal peripheral consolidation, compatible with viral pneumonia. Central airways are patent and normal in caliber. No pneumothorax, honeycombing or bronchiectasis. .     1. No evidence of PE. 2. Bilateral infiltrates compatible with Covid-19. Echocardiogram results:  No results found for this visit on 09/18/21.     Procedures done this admission:  * No surgery found *    All Micro Results     Procedure Component Value Units Date/Time    CULTURE, BLOOD [[id number]] Collected: 09/18/21 0638    Order Status: Completed Specimen: Blood Updated: 09/23/21 0711     Special Requests: --        LEFT  Antecubital       Culture result: NO GROWTH 5 DAYS       CULTURE, BLOOD [[id number]] Collected: 09/18/21 0649    Order Status: Completed Specimen: Blood Updated: 09/23/21 0711     Special Requests: --        RIGHT  Antecubital       Culture result: NO GROWTH 5 DAYS             [unfilled]    Labs: Results:       BMP, Mg, Phos Recent Labs     10/02/21  0640 10/01/21  0555 09/30/21  0538    139 138   K 3.9 4.0 3.6    104 102   CO2 28 27 28   AGAP 5* 8 8   BUN 17 18 17   CREA 1.00 1.03 0.95   CA 8.5 8.7 8.8   GLU 88 90 108*      CBC Recent Labs     10/01/21  0555 09/30/21  0538   WBC 8.6 8.4   RBC 4.41 4.37   HGB 13.0* 12.8*   HCT 39.1* 38.7*    366      LFT Recent Labs     10/02/21  0640 10/01/21  0555 09/30/21  0538   ALT 57 65 51    132 140*   TP 5.9* 6.0* 5.9*   ALB 2.3* 2.5* 2.3*   GLOB 3.6* 3.5 3.6*   AGRAT 0.6* 0.7* 0.6*      Cardiac Testing No results found for: BNPP, BNP, CPK, RCK1, RCK2, RCK3, RCK4, CKMB, CKNDX, CKND1, TROPT, TROIQ   Coagulation Tests No results found for: PTP, INR, APTT, INREXT   A1c No results found for: HBA1C, ECN7RIWC   Lipid Panel No results found for: CHOL, CHOLPOCT, CHOLX, CHLST, CHOLV, 837740, HDL, HDLP, LDL, LDLC, DLDLP, 436266, VLDLC, VLDL, TGLX, TRIGL, TRIGP, TGLPOCT, CHHD, CHHDX   Thyroid Panel No results found for: TSH, T4, FT4, TT3, T3U, TSHEXT     Most Recent UA Lab Results   Component Value Date/Time    Color YELLOW 11/26/2020 07:48 AM    Appearance CLOUDY 11/26/2020 07:48 AM    Specific gravity 1.008 11/26/2020 07:48 AM    pH (UA) 6.0 11/26/2020 07:48 AM    Protein Negative 11/26/2020 07:48 AM    Glucose Negative 11/26/2020 07:48 AM    Ketone Negative 11/26/2020 07:48 AM    Bilirubin Negative 11/26/2020 07:48 AM    Blood TRACE (A) 11/26/2020 07:48 AM    Urobilinogen 0.2 11/26/2020 07:48 AM    Nitrites Negative 11/26/2020 07:48 AM    Leukocyte Esterase Negative 11/26/2020 07:48 AM    WBC 0-3 11/26/2020 07:48 AM    RBC 0 11/26/2020 07:48 AM    Epithelial cells 0 11/26/2020 07:48 AM    Bacteria 0 11/26/2020 07:48 AM    Casts 0 11/26/2020 07:48 AM        No Known Allergies    There is no immunization history on file for this patient. All Labs from Last 24 Hrs:  Recent Results (from the past 24 hour(s))   METABOLIC PANEL, COMPREHENSIVE    Collection Time: 10/02/21  6:40 AM   Result Value Ref Range    Sodium 138 136 - 145 mmol/L    Potassium 3.9 3.5 - 5.1 mmol/L    Chloride 105 98 - 107 mmol/L    CO2 28 21 - 32 mmol/L    Anion gap 5 (L) 7 - 16 mmol/L    Glucose 88 65 - 100 mg/dL    BUN 17 6 - 23 MG/DL    Creatinine 1.00 0.8 - 1.5 MG/DL    GFR est AA >60 >60 ml/min/1.73m2    GFR est non-AA >60 >60 ml/min/1.73m2    Calcium 8.5 8.3 - 10.4 MG/DL    Bilirubin, total 0.3 0.2 - 1.1 MG/DL    ALT (SGPT) 57 12 - 65 U/L    AST (SGOT) 23 15 - 37 U/L    Alk.  phosphatase 118 50 - 136 U/L    Protein, total 5.9 (L) 6.3 - 8.2 g/dL    Albumin 2.3 (L) 3.5 - 5.0 g/dL    Globulin 3.6 (H) 2.3 - 3.5 g/dL    A-G Ratio 0.6 (L) 1.2 - 3.5         Discharge Exam:  Patient Vitals for the past 24 hrs:   Temp Pulse Resp BP SpO2   10/02/21 0826     92 %   10/02/21 0759 97.8 °F (36.6 °C) 71 18 110/62 93 %   10/02/21 0354     93 %   10/02/21 0334 98.2 °F (36.8 °C) 80 18 119/70 95 %   10/01/21 2331 98 °F (36.7 °C) 85 18 109/74 94 %   10/01/21 2131     93 %   10/01/21 1944 97.6 °F (36.4 °C) 79 17 122/79 93 %   10/01/21 1114 97.9 °F (36.6 °C) 88 18 109/73 93 %     Oxygen Therapy  O2 Sat (%): 92 % (10/02/21 0826)  Pulse via Oximetry: 80 beats per minute (10/02/21 0826)  O2 Device: Nasal cannula (10/02/21 0826)  Skin Assessment: Clean, dry, & intact (09/29/21 2412)  Skin Protection for O2 Device: No (09/26/21 2244)  O2 Flow Rate (L/min): 2 l/min (10/02/21 0826)  O2 Temperature: 87.8 °F (31 °C) (09/28/21 0743)  FIO2 (%): 55 % (10/02/21 0334)    Estimated body mass index is 23.62 kg/m² as calculated from the following:    Height as of this encounter: 6' 1\" (1.854 m). Weight as of this encounter: 81.2 kg (179 lb 0.2 oz). Intake/Output Summary (Last 24 hours) at 10/2/2021 1040  Last data filed at 10/2/2021 0334  Gross per 24 hour   Intake    Output 2700 ml   Net -2700 ml       *Note that automatically entered I/Os may not be accurate; dependent on patient compliance with collection and accurate  by assistants. General:    Well nourished. Alert. Eyes:   Normal sclerae. Extraocular movements intact. ENT:  Normocephalic, atraumatic. Moist mucous membranes  CV:   Regular rate and rhythm. No murmur, rub, or gallop. Lungs:  Clear to auscultation bilaterally. No wheezing, rhonchi, or rales. Abdomen: Soft, nontender, nondistended. Extremities: Warm and dry. No cyanosis or edema. Neurologic: CN II-XII grossly intact. No gross focal deficits   Skin:     No rashes or jaundice. Psych:  Normal mood and affect.     Current Med List in Hospital:   Current Facility-Administered Medications   Medication Dose Route Frequency    thiamine HCL (B-1) tablet 100 mg  100 mg Oral DAILY    hydrOXYzine pamoate (VISTARIL) capsule 25 mg  25 mg Oral TID    tiotropium bromide (SPIRIVA RESPIMAT) 2.5 mcg /actuation  2 Puff Inhalation DAILY    cholecalciferol (VITAMIN D3) (1000 Units /25 mcg) tablet 2,000 Units  2,000 Units Oral DAILY    ascorbic acid (vitamin C) (VITAMIN C) tablet 1,000 mg  1,000 mg Oral DAILY    sodium chloride (OCEAN) 0.65 % nasal squeeze bottle 2 Spray  2 Spray Both Nostrils TID    traZODone (DESYREL) tablet 200 mg  200 mg Oral QHS    guaiFENesin ER (MUCINEX) tablet 1,200 mg  1,200 mg Oral Q12H    budesonide-formoteroL (SYMBICORT) 160-4.5 mcg/actuation HFA inhaler 2 Puff  2 Puff Inhalation BID RT    pantoprazole (PROTONIX) tablet 40 mg  40 mg Oral ACB    baricitinib (OLUMIANT) tablet 4 mg  4 mg Oral DAILY    fluticasone propionate (FLONASE) 50 mcg/actuation nasal spray 2 Spray  2 Spray Both Nostrils BID    guaiFENesin-codeine (ROBITUSSIN AC) 100-10 mg/5 mL solution 10 mL  10 mL Oral Q4H PRN    sodium chloride (NS) flush 5-40 mL  5-40 mL IntraVENous Q8H    sodium chloride (NS) flush 5-40 mL  5-40 mL IntraVENous PRN    acetaminophen (TYLENOL) tablet 650 mg  650 mg Oral Q6H PRN    Or    acetaminophen (TYLENOL) suppository 650 mg  650 mg Rectal Q6H PRN    polyethylene glycol (MIRALAX) packet 17 g  17 g Oral DAILY PRN    promethazine (PHENERGAN) tablet 12.5 mg  12.5 mg Oral Q6H PRN    Or    ondansetron (ZOFRAN) injection 4 mg  4 mg IntraVENous Q6H PRN    enoxaparin (LOVENOX) injection 40 mg  40 mg SubCUTAneous DAILY       Discharge Info:   Current Discharge Medication List      START taking these medications    Details   guaiFENesin ER (MUCINEX) 1,200 mg Ta12 ER tablet Take 1 Tablet by mouth every twelve (12) hours for 7 days. Qty: 14 Tablet, Refills: 0  Start date: 10/2/2021, End date: 10/9/2021      pantoprazole (PROTONIX) 40 mg tablet Take 1 Tablet by mouth Daily (before breakfast) for 14 days.   Qty: 14 Tablet, Refills: 0  Start date: 10/3/2021, End date: 10/17/2021      budesonide-formoteroL (Symbicort) 80-4.5 mcg/actuation HFAA Take 2 Puffs by inhalation two (2) times a day for 14 days. Qty: 1 Each, Refills: 0  Start date: 10/2/2021, End date: 10/16/2021      fluticasone propionate (FLONASE) 50 mcg/actuation nasal spray 2 Sprays by Both Nostrils route daily for 7 days. Qty: 1 Each, Refills: 0  Start date: 10/2/2021, End date: 10/9/2021         CONTINUE these medications which have NOT CHANGED    Details   ondansetron (Zofran ODT) 4 mg disintegrating tablet Take 1 Tab by mouth every eight (8) hours as needed for Nausea. Qty: 10 Tab, Refills: 0         STOP taking these medications       dexAMETHasone (Decadron) 6 mg tablet Comments:   Reason for Stopping:         meloxicam (MOBIC) 15 mg tablet Comments:   Reason for Stopping:                 Time spent in patient discharge planning and coordination 36 minutes.     Signed:  Anais Huggins MD

## 2021-10-02 NOTE — PROGRESS NOTES
Pt currently resting on 2 L HFNC, O2 sat >90%. Pt denies needs at this time. Hourly rounds completed, bed in low locked position, call light within reach, and all needs met at this time. End of shift report given to on coming nurse.

## 2021-10-03 ENCOUNTER — HOME CARE VISIT (OUTPATIENT)
Dept: SCHEDULING | Facility: HOME HEALTH | Age: 56
End: 2021-10-03
Payer: COMMERCIAL

## 2021-10-03 VITALS
DIASTOLIC BLOOD PRESSURE: 68 MMHG | RESPIRATION RATE: 18 BRPM | OXYGEN SATURATION: 92 % | HEART RATE: 79 BPM | SYSTOLIC BLOOD PRESSURE: 128 MMHG | TEMPERATURE: 97.8 F

## 2021-10-03 PROCEDURE — G0299 HHS/HOSPICE OF RN EA 15 MIN: HCPCS

## 2021-10-03 PROCEDURE — 400013 HH SOC

## 2021-10-04 ENCOUNTER — HOME CARE VISIT (OUTPATIENT)
Dept: SCHEDULING | Facility: HOME HEALTH | Age: 56
End: 2021-10-04
Payer: COMMERCIAL

## 2021-10-04 VITALS
SYSTOLIC BLOOD PRESSURE: 106 MMHG | DIASTOLIC BLOOD PRESSURE: 70 MMHG | TEMPERATURE: 98.7 F | RESPIRATION RATE: 16 BRPM | OXYGEN SATURATION: 93 % | HEART RATE: 84 BPM

## 2021-10-04 PROCEDURE — G0151 HHCP-SERV OF PT,EA 15 MIN: HCPCS

## 2021-10-04 NOTE — Clinical Note
Situation/Background: 64year old male patient with PMH of transaminitis, hypoalbuminemia, thrombocytosis, pneumonia due to COVID-19 virus, sleep disorder, acute hypoxemic respiratory failure due to COVID-19. Lives with wife in multi level home with several steps to enter. Patient tested positive for COVID-19 in ED on 9/6/21. Patient hospitalized 9/18/21 to 10/2/21 with COVID-19. Prior to hospitalization patient was independent with all mobility without the use of an AD and he was independent with all ADLs. Assessment: Patient continues to be independent with all mobility without an AD. Has decreased endurance and fatigues quickly but reports he has been improving since being home. Recommendation: No further HHPT indicated at this time.

## 2021-10-06 ENCOUNTER — HOME CARE VISIT (OUTPATIENT)
Dept: SCHEDULING | Facility: HOME HEALTH | Age: 56
End: 2021-10-06
Payer: COMMERCIAL

## 2021-10-06 PROCEDURE — G0152 HHCP-SERV OF OT,EA 15 MIN: HCPCS

## 2021-10-06 PROCEDURE — G0299 HHS/HOSPICE OF RN EA 15 MIN: HCPCS

## 2021-10-07 VITALS
HEART RATE: 93 BPM | TEMPERATURE: 98.4 F | OXYGEN SATURATION: 95 % | SYSTOLIC BLOOD PRESSURE: 122 MMHG | DIASTOLIC BLOOD PRESSURE: 74 MMHG | RESPIRATION RATE: 20 BRPM

## 2021-10-08 VITALS
RESPIRATION RATE: 18 BRPM | TEMPERATURE: 96.5 F | HEART RATE: 94 BPM | DIASTOLIC BLOOD PRESSURE: 70 MMHG | SYSTOLIC BLOOD PRESSURE: 110 MMHG | OXYGEN SATURATION: 98 %

## 2021-10-11 ENCOUNTER — HOME CARE VISIT (OUTPATIENT)
Dept: SCHEDULING | Facility: HOME HEALTH | Age: 56
End: 2021-10-11
Payer: COMMERCIAL

## 2021-10-11 VITALS
DIASTOLIC BLOOD PRESSURE: 70 MMHG | RESPIRATION RATE: 18 BRPM | SYSTOLIC BLOOD PRESSURE: 122 MMHG | TEMPERATURE: 98.2 F | HEART RATE: 80 BPM | OXYGEN SATURATION: 96 %

## 2021-10-11 PROCEDURE — G0299 HHS/HOSPICE OF RN EA 15 MIN: HCPCS

## 2021-10-21 ENCOUNTER — HOME CARE VISIT (OUTPATIENT)
Dept: SCHEDULING | Facility: HOME HEALTH | Age: 56
End: 2021-10-21
Payer: COMMERCIAL

## 2021-10-21 VITALS
TEMPERATURE: 97.8 F | RESPIRATION RATE: 18 BRPM | SYSTOLIC BLOOD PRESSURE: 128 MMHG | OXYGEN SATURATION: 97 % | HEART RATE: 80 BPM | DIASTOLIC BLOOD PRESSURE: 74 MMHG

## 2021-10-21 PROCEDURE — G0299 HHS/HOSPICE OF RN EA 15 MIN: HCPCS

## (undated) DEVICE — JELLY,LUBE,STERILE,FLIP TOP,TUBE,4-OZ: Brand: MEDLINE

## (undated) DEVICE — PACK SURGICAL PROCEDURE KIT CYSTOSCOPY TOTE

## (undated) DEVICE — GDWIRE 3CM FLX-TIP 0.038X150CM -- BX/5 SENSOR

## (undated) DEVICE — SOLUTION IRRIG 3000ML 0.9% SOD CHL FLX CONT 0797208] ICU MEDICAL INC]

## (undated) DEVICE — LEGGINGS, PAIR, 31X48, STERILE: Brand: MEDLINE

## (undated) DEVICE — SYR 10ML LUER LOK 1/5ML GRAD --

## (undated) DEVICE — TRAY PREP DRY W/ PREM GLV 2 APPL 6 SPNG 2 UNDPD 1 OVERWRAP

## (undated) DEVICE — CYSTO/BLADDER IRRIGATION SET, REGULATING CLAMP